# Patient Record
Sex: FEMALE | Race: WHITE | NOT HISPANIC OR LATINO | Employment: UNEMPLOYED | URBAN - METROPOLITAN AREA
[De-identification: names, ages, dates, MRNs, and addresses within clinical notes are randomized per-mention and may not be internally consistent; named-entity substitution may affect disease eponyms.]

---

## 2019-05-07 ENCOUNTER — OFFICE VISIT (OUTPATIENT)
Dept: UROLOGY | Facility: CLINIC | Age: 39
End: 2019-05-07
Payer: COMMERCIAL

## 2019-05-07 VITALS
WEIGHT: 150.81 LBS | DIASTOLIC BLOOD PRESSURE: 74 MMHG | HEART RATE: 97 BPM | HEIGHT: 62 IN | SYSTOLIC BLOOD PRESSURE: 114 MMHG | BODY MASS INDEX: 27.75 KG/M2

## 2019-05-07 DIAGNOSIS — R31.29 OTHER MICROSCOPIC HEMATURIA: ICD-10-CM

## 2019-05-07 DIAGNOSIS — N32.81 OAB (OVERACTIVE BLADDER): ICD-10-CM

## 2019-05-07 DIAGNOSIS — N30.10 IC (INTERSTITIAL CYSTITIS): ICD-10-CM

## 2019-05-07 DIAGNOSIS — R31.29 HEMATURIA, MICROSCOPIC: ICD-10-CM

## 2019-05-07 DIAGNOSIS — R39.89 BLADDER PAIN: ICD-10-CM

## 2019-05-07 DIAGNOSIS — R10.2 PELVIC PAIN: Primary | ICD-10-CM

## 2019-05-07 PROCEDURE — 51700 PR IRRIGATION, BLADDER: ICD-10-PCS | Mod: S$GLB,,, | Performed by: UROLOGY

## 2019-05-07 PROCEDURE — 88112 CYTOPATH CELL ENHANCE TECH: CPT | Performed by: PATHOLOGY

## 2019-05-07 PROCEDURE — 88112 CYTOLOGY SPECIMEN-URINE: ICD-10-PCS | Mod: 26,,, | Performed by: PATHOLOGY

## 2019-05-07 PROCEDURE — 87086 URINE CULTURE/COLONY COUNT: CPT

## 2019-05-07 PROCEDURE — 88112 CYTOPATH CELL ENHANCE TECH: CPT | Mod: 26,,, | Performed by: PATHOLOGY

## 2019-05-07 PROCEDURE — 99999 PR PBB SHADOW E&M-NEW PATIENT-LVL IV: CPT | Mod: PBBFAC,,, | Performed by: UROLOGY

## 2019-05-07 PROCEDURE — 51700 IRRIGATION OF BLADDER: CPT | Mod: S$GLB,,, | Performed by: UROLOGY

## 2019-05-07 PROCEDURE — 3008F BODY MASS INDEX DOCD: CPT | Mod: CPTII,S$GLB,, | Performed by: UROLOGY

## 2019-05-07 PROCEDURE — 99205 OFFICE O/P NEW HI 60 MIN: CPT | Mod: 25,S$GLB,, | Performed by: UROLOGY

## 2019-05-07 PROCEDURE — 3008F PR BODY MASS INDEX (BMI) DOCUMENTED: ICD-10-PCS | Mod: CPTII,S$GLB,, | Performed by: UROLOGY

## 2019-05-07 PROCEDURE — 99205 PR OFFICE/OUTPT VISIT, NEW, LEVL V, 60-74 MIN: ICD-10-PCS | Mod: 25,S$GLB,, | Performed by: UROLOGY

## 2019-05-07 PROCEDURE — 99999 PR PBB SHADOW E&M-NEW PATIENT-LVL IV: ICD-10-PCS | Mod: PBBFAC,,, | Performed by: UROLOGY

## 2019-05-07 RX ORDER — TOLTERODINE 4 MG/1
4 CAPSULE, EXTENDED RELEASE ORAL DAILY
COMMUNITY
End: 2019-07-16

## 2019-05-07 RX ORDER — INDOMETHACIN 25 MG/1
50 CAPSULE ORAL
Status: COMPLETED | OUTPATIENT
Start: 2019-05-07 | End: 2019-05-07

## 2019-05-07 RX ORDER — HYDROXYZINE HYDROCHLORIDE 25 MG/1
25 TABLET, FILM COATED ORAL NIGHTLY
Qty: 30 TABLET | Refills: 12 | Status: SHIPPED | OUTPATIENT
Start: 2019-05-07 | End: 2019-06-06

## 2019-05-07 RX ORDER — TAMSULOSIN HYDROCHLORIDE 0.4 MG/1
0.4 CAPSULE ORAL NIGHTLY
Qty: 30 CAPSULE | Refills: 11 | Status: SHIPPED | OUTPATIENT
Start: 2019-05-07 | End: 2020-06-02 | Stop reason: SDUPTHER

## 2019-05-07 RX ORDER — DIPHENHYDRAMINE HCL 25 MG
50 TABLET ORAL ONCE
Qty: 2 TABLET | Refills: 0 | Status: SHIPPED | OUTPATIENT
Start: 2019-05-07 | End: 2019-05-07

## 2019-05-07 RX ORDER — PREDNISONE 50 MG/1
50 TABLET ORAL EVERY 6 HOURS
Qty: 3 TABLET | Refills: 0 | Status: SHIPPED | OUTPATIENT
Start: 2019-05-07 | End: 2019-05-17

## 2019-05-07 RX ORDER — HEPARIN SODIUM 10000 [USP'U]/ML
20000 INJECTION, SOLUTION INTRAVENOUS; SUBCUTANEOUS
Status: COMPLETED | OUTPATIENT
Start: 2019-05-07 | End: 2019-05-07

## 2019-05-07 RX ORDER — ESTRADIOL 2 MG/1
TABLET ORAL
COMMUNITY
Start: 2019-05-04 | End: 2022-07-12 | Stop reason: CLARIF

## 2019-05-07 RX ORDER — IBUPROFEN 200 MG
200 TABLET ORAL EVERY 6 HOURS PRN
COMMUNITY
End: 2022-07-12 | Stop reason: CLARIF

## 2019-05-07 RX ORDER — AMITRIPTYLINE HYDROCHLORIDE 25 MG/1
25 TABLET, FILM COATED ORAL NIGHTLY
Qty: 60 TABLET | Refills: 12 | Status: SHIPPED | OUTPATIENT
Start: 2019-05-07 | End: 2019-07-16

## 2019-05-07 RX ORDER — VENLAFAXINE HYDROCHLORIDE 75 MG/1
CAPSULE, EXTENDED RELEASE ORAL EVERY MORNING
COMMUNITY
Start: 2019-04-27

## 2019-05-07 RX ORDER — OMEGA-3-ACID ETHYL ESTERS 1 G/1
1 CAPSULE, LIQUID FILLED ORAL DAILY
COMMUNITY
End: 2023-07-25

## 2019-05-07 RX ORDER — LANOLIN ALCOHOL/MO/W.PET/CERES
100 CREAM (GRAM) TOPICAL EVERY 12 HOURS
COMMUNITY
End: 2022-07-12 | Stop reason: CLARIF

## 2019-05-07 RX ORDER — LORAZEPAM 1 MG/1
1 TABLET ORAL EVERY 6 HOURS PRN
Qty: 30 TABLET | Refills: 0 | Status: SHIPPED | OUTPATIENT
Start: 2019-05-07 | End: 2020-10-13

## 2019-05-07 RX ORDER — BUPIVACAINE HYDROCHLORIDE 5 MG/ML
50 INJECTION, SOLUTION PERINEURAL ONCE
Status: COMPLETED | OUTPATIENT
Start: 2019-05-07 | End: 2019-05-07

## 2019-05-07 RX ADMIN — INDOMETHACIN 50 MEQ: 25 CAPSULE ORAL at 04:05

## 2019-05-07 RX ADMIN — BUPIVACAINE HYDROCHLORIDE 250 MG: 5 INJECTION, SOLUTION PERINEURAL at 04:05

## 2019-05-07 RX ADMIN — HEPARIN SODIUM 20000 UNITS: 10000 INJECTION, SOLUTION INTRAVENOUS; SUBCUTANEOUS at 04:05

## 2019-05-07 NOTE — PROGRESS NOTES
CC: refractory IC ( bladder pain), want to get her bladder removed!    Chelsea Oconnell is a 38 y.o. woman who is here for the evaluation of IC (interstitial cystitis)    A new pt referred by her urologist in [place], [place] for refractory IC.  She lives in [place], [place] and has seen many urologists over the years.  She presents with at least 5 year hx of refractory bladder and pelvic pain with frequency, urgency, difficulty of emptying bladder.  Underwent many treatment for her urinary problems but with minimal improvement.  C/o constant pain in the bladder and pelvic area.  She wishes to get her bladder removed but her urologist in [place] did not do such surgery and refer her to me.  Had botox injection to the bladder but developed urinary retention and has done CIC for 1 year.  Now she is able to urinate but only small volume at a time with straining.    Urination symptoms: Positive for frequency, urgency, nocturia and incomplete emptying, straining.  Denies flank pain     Had 5 c-sections, 4 to 5 Gyn surgeries including hysterectomy ( no malignancy).  S/p removal of liver hemangioma.      No past medical history on file.  No past surgical history on file.  Social History     Tobacco Use    Smoking status: Never Smoker    Smokeless tobacco: Never Used   Substance Use Topics    Alcohol use: Not on file    Drug use: Not on file     No family history on file.  Allergy:  Review of patient's allergies indicates:   Allergen Reactions    Iodine and iodide containing products Rash and Swelling    Shellfish containing products Rash and Swelling    Sulfa (sulfonamide antibiotics) Rash and Swelling     Outpatient Encounter Medications as of 5/7/2019   Medication Sig Dispense Refill    calcium glycerophosphate (PRELIEF ORAL) Take 2 mg by mouth once daily.      conjugated estrogens (PREMARIN) vaginal cream Place vaginally.      cyanocobalamin (VITAMIN B-12) 1000 MCG tablet Take 100 mcg by mouth every 12 (twelve)  hours.      estradiol (ESTRACE) 2 MG tablet       ibuprofen (ADVIL,MOTRIN) 200 MG tablet Take 200 mg by mouth every 6 (six) hours as needed for Pain.      Lactobacillus rhamnosus GG (CULTURELLE) 10 billion cell capsule Take 1 capsule by mouth once daily.      omega-3 acid ethyl esters (LOVAZA) 1 gram capsule Take 1 g by mouth once daily.      tolterodine (DETROL LA) 4 MG 24 hr capsule Take 4 mg by mouth once daily.      venlafaxine (EFFEXOR-XR) 75 MG 24 hr capsule       amitriptyline (ELAVIL) 25 MG tablet Take 1 tablet (25 mg total) by mouth every evening. 1 to 2 tablets at bed time to help with bladder pain and sleeping 60 tablet 12    diphenhydrAMINE (BENADRYL ALLERGY) 25 mg tablet Take 2 tablets (50 mg total) by mouth once. Take them 1 hour before contrast x-ray study for 1 dose 2 tablet 0    hydrOXYzine HCl (ATARAX) 25 MG tablet Take 1 tablet (25 mg total) by mouth nightly. 30 tablet 12    LORazepam (ATIVAN) 1 MG tablet Take 1 tablet (1 mg total) by mouth every 6 (six) hours as needed for Anxiety. 30 tablet 0    predniSONE (DELTASONE) 50 MG Tab Take 1 tablet (50 mg total) by mouth every 6 (six) hours. Take the first tablet 13 hours before,  The second one 7 hours before,  And the third one 1 hour before radiology test. for 10 days 3 tablet 0    tamsulosin (FLOMAX) 0.4 mg Cap Take 1 capsule (0.4 mg total) by mouth every evening. 30 capsule 11     Facility-Administered Encounter Medications as of 5/7/2019   Medication Dose Route Frequency Provider Last Rate Last Dose    [COMPLETED] bupivacaine 0.5% (5 mg/ml) injection 250 mg  50 mL Intrapleural Once Donny Calle MD   250 mg at 05/07/19 1624    [COMPLETED] heparin (porcine) injection 20,000 Units  20,000 Units Subcutaneous 1 time in Clinic/HOD Donny Calle MD   20,000 Units at 05/07/19 1623    hydrocortisone sodium succinate injection 100 mg  100 mg Intramuscular Once Donny Calel MD        [COMPLETED] sodium bicarbonate solution 50 mEq  50  mEq Intravenous 1 time in Clinic/HOD Donny Calle MD   50 mEq at 05/07/19 1624     Review of Systems   ROS  Physical Exam     Vitals:    05/07/19 1445   BP: 114/74   Pulse: 97     Physical Exam   Genitourinary: Vagina normal.   Genitourinary Comments: No tenderness at the urethra.  Positive tenderness at the kerrie-urethral area on the both sides.  Positive tenderness on palpation each side of vaginal wall.  No tenderness on palpation at the base of the bladder.    Difficulty in localizing the levator ani on YEE of rectum.  Slight tenderness noted on the levator ani.         LABS:  No results found for: CREATININE  No results found for: LABURIN  UA trace blood and protein  PVR per cath 120 ml    Bladder Instillation Procedure:  A 16 F Gomez catheter was placed and the bladder was drained without problems.    Intravesical cocktail mixture treatment is given in a standard fashion.  The solution of 20,000 unit heparin, 50 ml 0.5 % Marcaine, 10 ml 1% lidocaine, and 10 ml 8.4% sodium bicarbonate was instilled in the bladder, and the catheter was removed. The patient was instructed to hold the mixture solution in the bladder for 20 to 30 minutes and to void as instructed.    Patient tolerated the procedure well.    Assessment and Plan:  Chelsea was seen today for ic (interstitial cystitis).    Diagnoses and all orders for this visit:    Pelvic pain  -     Ambulatory Referral to Physical/Occupational Therapy  -     tamsulosin (FLOMAX) 0.4 mg Cap; Take 1 capsule (0.4 mg total) by mouth every evening.  -     amitriptyline (ELAVIL) 25 MG tablet; Take 1 tablet (25 mg total) by mouth every evening. 1 to 2 tablets at bed time to help with bladder pain and sleeping  -     LORazepam (ATIVAN) 1 MG tablet; Take 1 tablet (1 mg total) by mouth every 6 (six) hours as needed for Anxiety.    IC (interstitial cystitis)  -     Urine culture  -     Cytology, urine  -     Comprehensive metabolic panel; Future  -     CBC Without  Differential; Future  -     heparin (porcine) injection 20,000 Units  -     bupivacaine 0.5% (5 mg/ml) injection 250 mg  -     sodium bicarbonate solution 50 mEq  -     hydrocortisone sodium succinate injection 100 mg  -     Prior Authorization Order  -     Simple Urodynamics w/ Cysto; Future  -     hydrOXYzine HCl (ATARAX) 25 MG tablet; Take 1 tablet (25 mg total) by mouth nightly.    Bladder pain  -     heparin (porcine) injection 20,000 Units  -     bupivacaine 0.5% (5 mg/ml) injection 250 mg  -     sodium bicarbonate solution 50 mEq  -     hydrocortisone sodium succinate injection 100 mg  -     Prior Authorization Order  -     Simple Urodynamics w/ Cysto; Future    Other microscopic hematuria  -     CT Urogram Abd Pelvis W WO; Future  -     Simple Urodynamics w/ Cysto; Future    OAB (overactive bladder)  -     tamsulosin (FLOMAX) 0.4 mg Cap; Take 1 capsule (0.4 mg total) by mouth every evening.    Hematuria, microscopic  -     diphenhydrAMINE (BENADRYL ALLERGY) 25 mg tablet; Take 2 tablets (50 mg total) by mouth once. Take them 1 hour before contrast x-ray study for 1 dose  -     predniSONE (DELTASONE) 50 MG Tab; Take 1 tablet (50 mg total) by mouth every 6 (six) hours. Take the first tablet 13 hours before,  The second one 7 hours before,  And the third one 1 hour before radiology test. for 10 days    I explained that the removal of the bladder in her may result in persistent pain.  On my exam today, her main pain is localized in the pelvic muscles.  Suspect pelvic floor dysfunction.  Will refer her to Physical Therapy ( she had some physical therapy in the past with minimal improvement).  She will start flomax, elavil, hydroxyzine, and ativan ( 1/2 to 1 tab at night).    Will further evaluate her with FUDS.  Because she has hematuria, will further evaluate her CT urogram and cysto under anesthesia.  Will plan trigger point injection of the painful pelvic muscles, cysto hydrodistention, bladder bx and bladder  instillation along with FUDS in 1 month or so.  Prednisone and Benadryl prep given for CT urogram.    She experienced significant improvement of her bladder pain after bladder instillation ( from 6/10 to 2/10 on visual scale, except pain at the urethra following the catheterization)    Follow-up:  Follow up for FUDS, cysto hydrodistetion, bladder bx, bladder instillation, Triger point injection.  CT urogram.

## 2019-05-07 NOTE — H&P (VIEW-ONLY)
CC: refractory IC ( bladder pain), want to get her bladder removed!    Chelsea Oconnell is a 38 y.o. woman who is here for the evaluation of IC (interstitial cystitis)    A new pt referred by her urologist in [place], [place] for refractory IC.  She lives in [place], [place] and has seen many urologists over the years.  She presents with at least 5 year hx of refractory bladder and pelvic pain with frequency, urgency, difficulty of emptying bladder.  Underwent many treatment for her urinary problems but with minimal improvement.  C/o constant pain in the bladder and pelvic area.  She wishes to get her bladder removed but her urologist in [place] did not do such surgery and refer her to me.  Had botox injection to the bladder but developed urinary retention and has done CIC for 1 year.  Now she is able to urinate but only small volume at a time with straining.    Urination symptoms: Positive for frequency, urgency, nocturia and incomplete emptying, straining.  Denies flank pain     Had 5 c-sections, 4 to 5 Gyn surgeries including hysterectomy ( no malignancy).  S/p removal of liver hemangioma.      No past medical history on file.  No past surgical history on file.  Social History     Tobacco Use    Smoking status: Never Smoker    Smokeless tobacco: Never Used   Substance Use Topics    Alcohol use: Not on file    Drug use: Not on file     No family history on file.  Allergy:  Review of patient's allergies indicates:   Allergen Reactions    Iodine and iodide containing products Rash and Swelling    Shellfish containing products Rash and Swelling    Sulfa (sulfonamide antibiotics) Rash and Swelling     Outpatient Encounter Medications as of 5/7/2019   Medication Sig Dispense Refill    calcium glycerophosphate (PRELIEF ORAL) Take 2 mg by mouth once daily.      conjugated estrogens (PREMARIN) vaginal cream Place vaginally.      cyanocobalamin (VITAMIN B-12) 1000 MCG tablet Take 100 mcg by mouth every 12 (twelve)  hours.      estradiol (ESTRACE) 2 MG tablet       ibuprofen (ADVIL,MOTRIN) 200 MG tablet Take 200 mg by mouth every 6 (six) hours as needed for Pain.      Lactobacillus rhamnosus GG (CULTURELLE) 10 billion cell capsule Take 1 capsule by mouth once daily.      omega-3 acid ethyl esters (LOVAZA) 1 gram capsule Take 1 g by mouth once daily.      tolterodine (DETROL LA) 4 MG 24 hr capsule Take 4 mg by mouth once daily.      venlafaxine (EFFEXOR-XR) 75 MG 24 hr capsule       amitriptyline (ELAVIL) 25 MG tablet Take 1 tablet (25 mg total) by mouth every evening. 1 to 2 tablets at bed time to help with bladder pain and sleeping 60 tablet 12    diphenhydrAMINE (BENADRYL ALLERGY) 25 mg tablet Take 2 tablets (50 mg total) by mouth once. Take them 1 hour before contrast x-ray study for 1 dose 2 tablet 0    hydrOXYzine HCl (ATARAX) 25 MG tablet Take 1 tablet (25 mg total) by mouth nightly. 30 tablet 12    LORazepam (ATIVAN) 1 MG tablet Take 1 tablet (1 mg total) by mouth every 6 (six) hours as needed for Anxiety. 30 tablet 0    predniSONE (DELTASONE) 50 MG Tab Take 1 tablet (50 mg total) by mouth every 6 (six) hours. Take the first tablet 13 hours before,  The second one 7 hours before,  And the third one 1 hour before radiology test. for 10 days 3 tablet 0    tamsulosin (FLOMAX) 0.4 mg Cap Take 1 capsule (0.4 mg total) by mouth every evening. 30 capsule 11     Facility-Administered Encounter Medications as of 5/7/2019   Medication Dose Route Frequency Provider Last Rate Last Dose    [COMPLETED] bupivacaine 0.5% (5 mg/ml) injection 250 mg  50 mL Intrapleural Once Donny Calle MD   250 mg at 05/07/19 1624    [COMPLETED] heparin (porcine) injection 20,000 Units  20,000 Units Subcutaneous 1 time in Clinic/HOD Donny Calle MD   20,000 Units at 05/07/19 1623    hydrocortisone sodium succinate injection 100 mg  100 mg Intramuscular Once Donny Calle MD        [COMPLETED] sodium bicarbonate solution 50 mEq  50  mEq Intravenous 1 time in Clinic/HOD Donny Calle MD   50 mEq at 05/07/19 1624     Review of Systems   ROS  Physical Exam     Vitals:    05/07/19 1445   BP: 114/74   Pulse: 97     Physical Exam   Genitourinary: Vagina normal.   Genitourinary Comments: No tenderness at the urethra.  Positive tenderness at the kerrie-urethral area on the both sides.  Positive tenderness on palpation each side of vaginal wall.  No tenderness on palpation at the base of the bladder.    Difficulty in localizing the levator ani on YEE of rectum.  Slight tenderness noted on the levator ani.         LABS:  No results found for: CREATININE  No results found for: LABURIN  UA trace blood and protein  PVR per cath 120 ml    Bladder Instillation Procedure:  A 16 F Gomez catheter was placed and the bladder was drained without problems.    Intravesical cocktail mixture treatment is given in a standard fashion.  The solution of 20,000 unit heparin, 50 ml 0.5 % Marcaine, 10 ml 1% lidocaine, and 10 ml 8.4% sodium bicarbonate was instilled in the bladder, and the catheter was removed. The patient was instructed to hold the mixture solution in the bladder for 20 to 30 minutes and to void as instructed.    Patient tolerated the procedure well.    Assessment and Plan:  Chelsea was seen today for ic (interstitial cystitis).    Diagnoses and all orders for this visit:    Pelvic pain  -     Ambulatory Referral to Physical/Occupational Therapy  -     tamsulosin (FLOMAX) 0.4 mg Cap; Take 1 capsule (0.4 mg total) by mouth every evening.  -     amitriptyline (ELAVIL) 25 MG tablet; Take 1 tablet (25 mg total) by mouth every evening. 1 to 2 tablets at bed time to help with bladder pain and sleeping  -     LORazepam (ATIVAN) 1 MG tablet; Take 1 tablet (1 mg total) by mouth every 6 (six) hours as needed for Anxiety.    IC (interstitial cystitis)  -     Urine culture  -     Cytology, urine  -     Comprehensive metabolic panel; Future  -     CBC Without  Differential; Future  -     heparin (porcine) injection 20,000 Units  -     bupivacaine 0.5% (5 mg/ml) injection 250 mg  -     sodium bicarbonate solution 50 mEq  -     hydrocortisone sodium succinate injection 100 mg  -     Prior Authorization Order  -     Simple Urodynamics w/ Cysto; Future  -     hydrOXYzine HCl (ATARAX) 25 MG tablet; Take 1 tablet (25 mg total) by mouth nightly.    Bladder pain  -     heparin (porcine) injection 20,000 Units  -     bupivacaine 0.5% (5 mg/ml) injection 250 mg  -     sodium bicarbonate solution 50 mEq  -     hydrocortisone sodium succinate injection 100 mg  -     Prior Authorization Order  -     Simple Urodynamics w/ Cysto; Future    Other microscopic hematuria  -     CT Urogram Abd Pelvis W WO; Future  -     Simple Urodynamics w/ Cysto; Future    OAB (overactive bladder)  -     tamsulosin (FLOMAX) 0.4 mg Cap; Take 1 capsule (0.4 mg total) by mouth every evening.    Hematuria, microscopic  -     diphenhydrAMINE (BENADRYL ALLERGY) 25 mg tablet; Take 2 tablets (50 mg total) by mouth once. Take them 1 hour before contrast x-ray study for 1 dose  -     predniSONE (DELTASONE) 50 MG Tab; Take 1 tablet (50 mg total) by mouth every 6 (six) hours. Take the first tablet 13 hours before,  The second one 7 hours before,  And the third one 1 hour before radiology test. for 10 days    I explained that the removal of the bladder in her may result in persistent pain.  On my exam today, her main pain is localized in the pelvic muscles.  Suspect pelvic floor dysfunction.  Will refer her to Physical Therapy ( she had some physical therapy in the past with minimal improvement).  She will start flomax, elavil, hydroxyzine, and ativan ( 1/2 to 1 tab at night).    Will further evaluate her with FUDS.  Because she has hematuria, will further evaluate her CT urogram and cysto under anesthesia.  Will plan trigger point injection of the painful pelvic muscles, cysto hydrodistention, bladder bx and bladder  instillation along with FUDS in 1 month or so.  Prednisone and Benadryl prep given for CT urogram.    She experienced significant improvement of her bladder pain after bladder instillation ( from 6/10 to 2/10 on visual scale, except pain at the urethra following the catheterization)    Follow-up:  Follow up for FUDS, cysto hydrodistetion, bladder bx, bladder instillation, Triger point injection.  CT urogram.

## 2019-05-08 ENCOUNTER — TELEPHONE (OUTPATIENT)
Dept: UROLOGY | Facility: CLINIC | Age: 39
End: 2019-05-08

## 2019-05-08 DIAGNOSIS — R33.9 INCOMPLETE BLADDER EMPTYING: Primary | ICD-10-CM

## 2019-05-08 DIAGNOSIS — R10.2 PELVIC PAIN: ICD-10-CM

## 2019-05-08 DIAGNOSIS — N30.10 IC (INTERSTITIAL CYSTITIS): Primary | ICD-10-CM

## 2019-05-08 DIAGNOSIS — N31.8 FREQUENCY-URGENCY SYNDROME: ICD-10-CM

## 2019-05-08 DIAGNOSIS — R39.89 BLADDER PAIN: ICD-10-CM

## 2019-05-08 LAB — BACTERIA UR CULT: NO GROWTH

## 2019-05-09 NOTE — TELEPHONE ENCOUNTER
IC (interstitial cystitis)  -     Case Request Operating Room: CYSTOSCOPY, WITH BLADDER HYDRODISTENSION AND BIOPSY, INSTILLATION, BLADDER, INJECTION, BOTULINUM TOXIN, TYPE A TRIGGER POINT INJECTION TO PELVIC MUSCLE    Bladder pain  -     Case Request Operating Room: CYSTOSCOPY, WITH BLADDER HYDRODISTENSION AND BIOPSY, INSTILLATION, BLADDER, INJECTION, BOTULINUM TOXIN, TYPE A TRIGGER POINT INJECTION TO PELVIC MUSCLE    Pelvic pain  -     Case Request Operating Room: CYSTOSCOPY, WITH BLADDER HYDRODISTENSION AND BIOPSY, INSTILLATION, BLADDER, INJECTION, BOTULINUM TOXIN, TYPE A TRIGGER POINT INJECTION TO PELVIC MUSCLE    Frequency-urgency syndrome  -     Case Request Operating Room: CYSTOSCOPY, WITH BLADDER HYDRODISTENSION AND BIOPSY, INSTILLATION, BLADDER, INJECTION, BOTULINUM TOXIN, TYPE A TRIGGER POINT INJECTION TO PELVIC MUSCLE

## 2019-05-09 NOTE — TELEPHONE ENCOUNTER
Incomplete bladder emptying  -     Case Request Operating Room: URODYNAMIC STUDY, FLUOROSCOPIC

## 2019-05-24 ENCOUNTER — ANESTHESIA EVENT (OUTPATIENT)
Dept: SURGERY | Facility: HOSPITAL | Age: 39
End: 2019-05-24
Payer: COMMERCIAL

## 2019-05-24 NOTE — PRE ADMISSION SCREENING
Anesthesia Assessment: Preoperative EQUATION    Planned Procedure: Procedure(s) (LRB):  CYSTOSCOPY, WITH BLADDER HYDRODISTENSION AND BIOPSY (N/A)  INSTILLATION, BLADDER (N/A)  INJECTION, BOTULINUM TOXIN, TYPE A TRIGGER POINT INJECTION TO PELVIC MUSCLE (N/A)  Requested Anesthesia Type:Monitor Anesthesia Care  Surgeon: Donny Calle MD  Service: Urology  Known or anticipated Date of Surgery:6/5/2019    Surgeon notes: reviewed    Electronic QUestionnaire Assessment completed via nurse interview with patient.        No AQ      Triage considerations:     The patient has no apparent active cardiac condition (No unstable coronary Syndrome such as severe unstable angina or recent [<1 month] myocardial infarction, decompensated CHF, severe valvular   disease or significant arrhythmia)    Previous anesthesia records:GETA, No problems and Not available    Last PCP note: 6-12 months ago , outside [facility]   Subspecialty notes: n/a    Other important co-morbidities: Lupus     Tests already available:  Available tests,  within 1 month , within [facility] . 5/7/19 CBC, CMP. No EKG.            Instructions given. (See in Nurse's note)    Optimization:  Anesthesia Preop Clinic Assessment  Indicated-not required for this procedure          Plan:    Testing:  none     Patient  has previously scheduled Medical Appointment:  6/4 pm CT Urogram    Navigation:                Straight Line to surgery.               No tests, anesthesia preop clinic visit, or consult required.

## 2019-05-24 NOTE — ANESTHESIA PREPROCEDURE EVALUATION
Che Abdalla RN   Registered Nurse      Pre Admission Screening   Signed                       []Hide copied text    []Hover for details  Anesthesia Assessment: Preoperative EQUATION     Planned Procedure: Procedure(s) (LRB):  CYSTOSCOPY, WITH BLADDER HYDRODISTENSION AND BIOPSY (N/A)  INSTILLATION, BLADDER (N/A)  INJECTION, BOTULINUM TOXIN, TYPE A TRIGGER POINT INJECTION TO PELVIC MUSCLE (N/A)  Requested Anesthesia Type:Monitor Anesthesia Care  Surgeon: Donny Calle MD  Service: Urology  Known or anticipated Date of Surgery:6/5/2019     Surgeon notes: reviewed     Electronic QUestionnaire Assessment completed via nurse interview with patient.         No AQ        Triage considerations:      The patient has no apparent active cardiac condition (No unstable coronary Syndrome such as severe unstable angina or recent [<1 month] myocardial infarction, decompensated CHF, severe valvular   disease or significant arrhythmia)     Previous anesthesia records:GETA, No problems and Not available     Last PCP note: 6-12 months ago , outside [facility]   Subspecialty notes: n/a     Other important co-morbidities: Lupus     Tests already available:  Available tests,  within 1 month , within [facility] . 5/7/19 CBC, CMP. No EKG.                            Instructions given. (See in Nurse's note)     Optimization:  Anesthesia Preop Clinic Assessment  Indicated-not required for this procedure                    Plan:    Testing:  none                           Patient  has previously scheduled Medical Appointment:  6/4 pm CT Urogram     Navigation:                          Straight Line to surgery.                          No tests, anesthesia preop clinic visit, or consult required.                                                      Electronically signed by Che Abdalla RN at 5/24/2019  2:59 PM       Pre-admit on 6/5/2019          Detailed Report                                                                                                                   05/24/2019  Chelsea Oconnell is a 38 y.o., female.    Diagnosis:       IC (interstitial cystitis) [N30.10]      Bladder pain [R39.89]      Pelvic pain [R10.2]      Frequency-urgency syndrome [N31.8]    Pre-operative evaluation for Procedure(s) (LRB):  CYSTOSCOPY, WITH BLADDER HYDRODISTENSION AND BIOPSY (N/A)  INSTILLATION, BLADDER (N/A)  INJECTION, BOTULINUM TOXIN, TYPE A TRIGGER POINT INJECTION TO PELVIC MUSCLE (N/A)    Encounter Diagnoses   Name Primary?    Interstitial cystitis     Bladder disorder        Review of patient's allergies indicates:   Allergen Reactions    Iodine and iodide containing products Rash and Swelling    Shellfish containing products Rash and Swelling    Sulfa (sulfonamide antibiotics) Rash and Swelling       No current facility-administered medications on file prior to encounter.      Current Outpatient Medications on File Prior to Encounter   Medication Sig Dispense Refill    amitriptyline (ELAVIL) 25 MG tablet Take 1 tablet (25 mg total) by mouth every evening. 1 to 2 tablets at bed time to help with bladder pain and sleeping 60 tablet 12    cyanocobalamin (VITAMIN B-12) 1000 MCG tablet Take 100 mcg by mouth every 12 (twelve) hours.      estradiol (ESTRACE) 2 MG tablet       hydrOXYzine HCl (ATARAX) 25 MG tablet Take 1 tablet (25 mg total) by mouth nightly. 30 tablet 12    ibuprofen (ADVIL,MOTRIN) 200 MG tablet Take 200 mg by mouth every 6 (six) hours as needed for Pain.      Lactobacillus rhamnosus GG (CULTURELLE) 10 billion cell capsule Take 1 capsule by mouth once daily.      omega-3 acid ethyl esters (LOVAZA) 1 gram capsule Take 1 g by mouth once daily.      tamsulosin (FLOMAX) 0.4 mg Cap Take 1 capsule (0.4 mg total) by mouth every evening. 30 capsule 11    venlafaxine (EFFEXOR-XR) 75 MG 24 hr capsule       calcium glycerophosphate (PRELIEF ORAL) Take 2 mg by mouth once daily.      conjugated estrogens (PREMARIN) vaginal cream  Place vaginally.      LORazepam (ATIVAN) 1 MG tablet Take 1 tablet (1 mg total) by mouth every 6 (six) hours as needed for Anxiety. 30 tablet 0    tolterodine (DETROL LA) 4 MG 24 hr capsule Take 4 mg by mouth once daily.         Social History     Tobacco Use   Smoking Status Never Smoker   Smokeless Tobacco Never Used       Social History     Substance and Sexual Activity   Alcohol Use Never    Frequency: Never       Patient Active Problem List   Diagnosis    IC (interstitial cystitis)    Bladder pain    OAB (overactive bladder)    Interstitial cystitis       Past Surgical History:   Procedure Laterality Date    HYSTERECTOMY      liver      hemangioma removed           No results for input(s): HCT in the last 72 hours.  No results for input(s): PLT in the last 72 hours.  No results for input(s): K in the last 72 hours.  No results for input(s): CREATININE in the last 72 hours.  No results for input(s): GLU in the last 72 hours.  No results for input(s): PT in the last 72 hours.                    Anesthesia Evaluation         Review of Systems  Anesthesia Hx:  No problems with previous Anesthesia WANTS TO KEEP IN HEAERING AIDS, RED ON RIGHT, BLUE ON LEFT.  I SAID WE WOULD TAKE THEM OUT WHEN WE SEDATE HER DEEPLY History of prior surgery of interest to airway management or planning: Previous anesthesia: General liver hemangioma removed with general anesthesia.  Denies Family Hx of Anesthesia complications.   Denies Personal Hx of Anesthesia complications.   Social:  No Alcohol Use, Non-Smoker    Hematology/Oncology:     Oncology Normal   Hematology Comments: luupus well controlled right now   EENT/Dental:   Ears General/Symptom(s) Hearing Impairment: hearing-aid left, hearing-aid right    Cardiovascular:   Denies Hypertension.  Denies MI.    Denies Angina. Walks a mile on occasion. Functional Capacity good / => 4 METS    Pulmonary:  Pulmonary Normal  Denies COPD.  Denies Asthma.  Denies Shortness of breath.   Denies Recent URI.    Renal/:  Renal Symptoms/Infections/Stones: frequency, urgency.  Other Renal / Gu Conditions: (possible pelvic floor dysfunction) Interstitial Cystitis  Hepatic/GI:   Liver Disease, (had liver resection for hemangioma )  Liver Disease S/P resection of liver hemangioma   Musculoskeletal:  Rheumatic Disease, Lupus    Neurological:  Neurology Normal Denies TIA.  Denies CVA. Denies Seizures.    Endocrine:  Endocrine Normal Denies Diabetes. Prone to hypoglycemia with dizziness and headaches responds hard candy, will do accucheck and give d5 1/2 ns   Psych:   depression          Physical Exam  General:  Well nourished    Airway/Jaw/Neck:  Airway Findings: Mouth Opening: Normal Tongue: Normal  General Airway Assessment: Adult, Average  Mallampati: II  TM Distance: Normal, at least 6 cm  Jaw/Neck Findings:  Neck ROM: Normal ROM            Mental Status:  Mental Status Findings:  Cooperative, Alert and Oriented         Anesthesia Plan  Type of Anesthesia, risks & benefits discussed:  Anesthesia Type:  general, MAC  Patient's Preference:   Intra-op Monitoring Plan:   Intra-op Monitoring Plan Comments:   Post Op Pain Control Plan:   Post Op Pain Control Plan Comments: As per surgeon's plan  Induction:   IV  Beta Blocker:  Patient is not currently on a Beta-Blocker (No further documentation required).       Informed Consent: Patient understands risks and agrees with Anesthesia plan.  Questions answered. Anesthesia consent signed with patient.  ASA Score: 2     Day of Surgery Review of History & Physical:    H&P update referred to the surgeon.         Ready For Surgery From Anesthesia Perspective.

## 2019-06-04 ENCOUNTER — TELEPHONE (OUTPATIENT)
Dept: UROLOGY | Facility: CLINIC | Age: 39
End: 2019-06-04

## 2019-06-04 ENCOUNTER — HOSPITAL ENCOUNTER (OUTPATIENT)
Dept: RADIOLOGY | Facility: HOSPITAL | Age: 39
Discharge: HOME | End: 2019-06-04
Attending: UROLOGY
Payer: COMMERCIAL

## 2019-06-04 VITALS
DIASTOLIC BLOOD PRESSURE: 72 MMHG | RESPIRATION RATE: 18 BRPM | SYSTOLIC BLOOD PRESSURE: 120 MMHG | OXYGEN SATURATION: 99 % | HEART RATE: 89 BPM

## 2019-06-04 DIAGNOSIS — R31.29 OTHER MICROSCOPIC HEMATURIA: ICD-10-CM

## 2019-06-04 PROCEDURE — 74178 CT ABD&PLV WO CNTR FLWD CNTR: CPT | Mod: 26,,, | Performed by: RADIOLOGY

## 2019-06-04 PROCEDURE — 74178 CT UROGRAM ABD PELVIS W WO: ICD-10-PCS | Mod: 26,,, | Performed by: RADIOLOGY

## 2019-06-04 PROCEDURE — 74178 CT ABD&PLV WO CNTR FLWD CNTR: CPT | Mod: TC

## 2019-06-04 PROCEDURE — 25500020 PHARM REV CODE 255: Performed by: UROLOGY

## 2019-06-04 RX ADMIN — IOHEXOL 75 ML: 350 INJECTION, SOLUTION INTRAVENOUS at 07:06

## 2019-06-04 NOTE — CARE UPDATE
Patient states she has had previous reaction to iodine and is taking a 13hrs prednisone prep ordered by her doctor. Discussed with tech and orders for benadryl 50mg po obtained from radiologist. 50 PO benadryl given as ordered. Will monitor for contrast reaction during and post contrast.

## 2019-06-05 ENCOUNTER — HOSPITAL ENCOUNTER (OUTPATIENT)
Facility: HOSPITAL | Age: 39
Discharge: HOME | End: 2019-06-05
Attending: UROLOGY | Admitting: UROLOGY
Payer: COMMERCIAL

## 2019-06-05 ENCOUNTER — ANESTHESIA (OUTPATIENT)
Dept: SURGERY | Facility: HOSPITAL | Age: 39
End: 2019-06-05
Payer: COMMERCIAL

## 2019-06-05 DIAGNOSIS — N30.10 INTERSTITIAL CYSTITIS: Primary | ICD-10-CM

## 2019-06-05 DIAGNOSIS — N32.9 BLADDER DISORDER: ICD-10-CM

## 2019-06-05 LAB — POCT GLUCOSE: 119 MG/DL (ref 70–110)

## 2019-06-05 PROCEDURE — D9220A PRA ANESTHESIA: ICD-10-PCS | Mod: CRNA,,, | Performed by: NURSE ANESTHETIST, CERTIFIED REGISTERED

## 2019-06-05 PROCEDURE — 36000704 HC OR TIME LEV I 1ST 15 MIN: Performed by: UROLOGY

## 2019-06-05 PROCEDURE — 71000044 HC DOSC ROUTINE RECOVERY FIRST HOUR: Performed by: UROLOGY

## 2019-06-05 PROCEDURE — 52260 PR CYSTOSCOPY,DIL BLADDER,GEN ANESTH: ICD-10-PCS | Mod: ,,, | Performed by: UROLOGY

## 2019-06-05 PROCEDURE — 25000003 PHARM REV CODE 250: Performed by: STUDENT IN AN ORGANIZED HEALTH CARE EDUCATION/TRAINING PROGRAM

## 2019-06-05 PROCEDURE — 82962 GLUCOSE BLOOD TEST: CPT | Performed by: UROLOGY

## 2019-06-05 PROCEDURE — 63600175 PHARM REV CODE 636 W HCPCS

## 2019-06-05 PROCEDURE — S5010 5% DEXTROSE AND 0.45% SALINE: HCPCS | Performed by: ANESTHESIOLOGY

## 2019-06-05 PROCEDURE — 27200973 HC CYSTO SUPPLY IV (URODYNAMICS): Performed by: UROLOGY

## 2019-06-05 PROCEDURE — 37000008 HC ANESTHESIA 1ST 15 MINUTES: Performed by: UROLOGY

## 2019-06-05 PROCEDURE — 37000009 HC ANESTHESIA EA ADD 15 MINS: Performed by: UROLOGY

## 2019-06-05 PROCEDURE — 36000706: Performed by: UROLOGY

## 2019-06-05 PROCEDURE — S0020 INJECTION, BUPIVICAINE HYDRO: HCPCS

## 2019-06-05 PROCEDURE — 71000015 HC POSTOP RECOV 1ST HR: Performed by: UROLOGY

## 2019-06-05 PROCEDURE — D9220A PRA ANESTHESIA: Mod: ANES,,, | Performed by: ANESTHESIOLOGY

## 2019-06-05 PROCEDURE — 51797 PR VOIDING PRESS STUDY INTRA-ABDOMINAL VOID: ICD-10-PCS | Mod: 26,,, | Performed by: UROLOGY

## 2019-06-05 PROCEDURE — 71000016 HC POSTOP RECOV ADDL HR: Performed by: UROLOGY

## 2019-06-05 PROCEDURE — 51728 CYSTOMETROGRAM W/VP: CPT | Mod: 26,,, | Performed by: UROLOGY

## 2019-06-05 PROCEDURE — 51700 IRRIGATION OF BLADDER: CPT | Mod: 51,,, | Performed by: UROLOGY

## 2019-06-05 PROCEDURE — 63600175 PHARM REV CODE 636 W HCPCS: Performed by: ANESTHESIOLOGY

## 2019-06-05 PROCEDURE — 63600175 PHARM REV CODE 636 W HCPCS: Performed by: STUDENT IN AN ORGANIZED HEALTH CARE EDUCATION/TRAINING PROGRAM

## 2019-06-05 PROCEDURE — D9220A PRA ANESTHESIA: Mod: CRNA,,, | Performed by: NURSE ANESTHETIST, CERTIFIED REGISTERED

## 2019-06-05 PROCEDURE — 36000707: Performed by: UROLOGY

## 2019-06-05 PROCEDURE — 52260 CYSTOSCOPY AND TREATMENT: CPT | Mod: ,,, | Performed by: UROLOGY

## 2019-06-05 PROCEDURE — 51728 PR COMPLEX CYSTOMETROGRAM VOIDING PRESSURE STUDIES: ICD-10-PCS | Mod: 26,,, | Performed by: UROLOGY

## 2019-06-05 PROCEDURE — 51700 PR IRRIGATION, BLADDER: ICD-10-PCS | Mod: 51,,, | Performed by: UROLOGY

## 2019-06-05 PROCEDURE — 51784 PR ANAL/URINARY MUSCLE STUDY: ICD-10-PCS | Mod: 26,51,, | Performed by: UROLOGY

## 2019-06-05 PROCEDURE — 25000003 PHARM REV CODE 250

## 2019-06-05 PROCEDURE — 36000705 HC OR TIME LEV I EA ADD 15 MIN: Performed by: UROLOGY

## 2019-06-05 PROCEDURE — 51741 PR UROFLOWMETRY, COMPLEX: ICD-10-PCS | Mod: 26,51,, | Performed by: UROLOGY

## 2019-06-05 PROCEDURE — 51797 INTRAABDOMINAL PRESSURE TEST: CPT | Mod: 26,,, | Performed by: UROLOGY

## 2019-06-05 PROCEDURE — D9220A PRA ANESTHESIA: ICD-10-PCS | Mod: ANES,,, | Performed by: ANESTHESIOLOGY

## 2019-06-05 PROCEDURE — 51741 ELECTRO-UROFLOWMETRY FIRST: CPT | Mod: 26,51,, | Performed by: UROLOGY

## 2019-06-05 PROCEDURE — 25000003 PHARM REV CODE 250: Performed by: ANESTHESIOLOGY

## 2019-06-05 PROCEDURE — 51784 ANAL/URINARY MUSCLE STUDY: CPT | Mod: 26,51,, | Performed by: UROLOGY

## 2019-06-05 PROCEDURE — 63600175 PHARM REV CODE 636 W HCPCS: Performed by: NURSE ANESTHETIST, CERTIFIED REGISTERED

## 2019-06-05 RX ORDER — LIDOCAINE HYDROCHLORIDE 10 MG/ML
1 INJECTION, SOLUTION EPIDURAL; INFILTRATION; INTRACAUDAL; PERINEURAL ONCE
Status: COMPLETED | OUTPATIENT
Start: 2019-06-05 | End: 2019-06-05

## 2019-06-05 RX ORDER — LIDOCAINE HCL/PF 100 MG/5ML
SYRINGE (ML) INTRAVENOUS
Status: DISCONTINUED | OUTPATIENT
Start: 2019-06-05 | End: 2019-06-05

## 2019-06-05 RX ORDER — PROPOFOL 10 MG/ML
VIAL (ML) INTRAVENOUS
Status: DISCONTINUED | OUTPATIENT
Start: 2019-06-05 | End: 2019-06-05

## 2019-06-05 RX ORDER — ONDANSETRON 8 MG/1
8 TABLET, ORALLY DISINTEGRATING ORAL EVERY 8 HOURS PRN
Status: DISCONTINUED | OUTPATIENT
Start: 2019-06-05 | End: 2019-06-05 | Stop reason: HOSPADM

## 2019-06-05 RX ORDER — PROPOFOL 10 MG/ML
VIAL (ML) INTRAVENOUS CONTINUOUS PRN
Status: DISCONTINUED | OUTPATIENT
Start: 2019-06-05 | End: 2019-06-05

## 2019-06-05 RX ORDER — PHENAZOPYRIDINE HYDROCHLORIDE 200 MG/1
200 TABLET, FILM COATED ORAL ONCE
Status: DISCONTINUED | OUTPATIENT
Start: 2019-06-05 | End: 2019-06-05 | Stop reason: HOSPADM

## 2019-06-05 RX ORDER — KETOROLAC TROMETHAMINE 30 MG/ML
INJECTION, SOLUTION INTRAMUSCULAR; INTRAVENOUS
Status: DISCONTINUED
Start: 2019-06-05 | End: 2019-06-05 | Stop reason: HOSPADM

## 2019-06-05 RX ORDER — SODIUM CHLORIDE 9 MG/ML
INJECTION, SOLUTION INTRAVENOUS CONTINUOUS
Status: DISCONTINUED | OUTPATIENT
Start: 2019-06-05 | End: 2019-06-05 | Stop reason: HOSPADM

## 2019-06-05 RX ORDER — HYDROMORPHONE HYDROCHLORIDE 1 MG/ML
0.2 INJECTION, SOLUTION INTRAMUSCULAR; INTRAVENOUS; SUBCUTANEOUS EVERY 5 MIN PRN
Status: DISCONTINUED | OUTPATIENT
Start: 2019-06-05 | End: 2019-06-05 | Stop reason: HOSPADM

## 2019-06-05 RX ORDER — FENTANYL CITRATE 50 UG/ML
INJECTION, SOLUTION INTRAMUSCULAR; INTRAVENOUS
Status: DISCONTINUED | OUTPATIENT
Start: 2019-06-05 | End: 2019-06-05

## 2019-06-05 RX ORDER — HYDROCODONE BITARTRATE AND ACETAMINOPHEN 5; 325 MG/1; MG/1
1 TABLET ORAL EVERY 4 HOURS PRN
Status: DISCONTINUED | OUTPATIENT
Start: 2019-06-05 | End: 2019-06-05 | Stop reason: HOSPADM

## 2019-06-05 RX ORDER — CEFAZOLIN SODIUM 1 G/3ML
2 INJECTION, POWDER, FOR SOLUTION INTRAMUSCULAR; INTRAVENOUS
Status: COMPLETED | OUTPATIENT
Start: 2019-06-05 | End: 2019-06-05

## 2019-06-05 RX ORDER — CEPHALEXIN 500 MG/1
500 CAPSULE ORAL EVERY 12 HOURS
Qty: 6 CAPSULE | Refills: 0 | Status: SHIPPED | OUTPATIENT
Start: 2019-06-05 | End: 2019-06-08

## 2019-06-05 RX ORDER — HEPARIN SODIUM 1000 [USP'U]/ML
INJECTION, SOLUTION INTRAVENOUS; SUBCUTANEOUS
Status: COMPLETED
Start: 2019-06-05 | End: 2019-06-05

## 2019-06-05 RX ORDER — INDOMETHACIN 25 MG/1
CAPSULE ORAL
Status: COMPLETED
Start: 2019-06-05 | End: 2019-06-05

## 2019-06-05 RX ORDER — LIDOCAINE HYDROCHLORIDE 10 MG/ML
INJECTION INFILTRATION; PERINEURAL
Status: COMPLETED
Start: 2019-06-05 | End: 2019-06-05

## 2019-06-05 RX ORDER — LIDOCAINE HYDROCHLORIDE 10 MG/ML
INJECTION, SOLUTION EPIDURAL; INFILTRATION; INTRACAUDAL; PERINEURAL
Status: COMPLETED
Start: 2019-06-05 | End: 2019-06-05

## 2019-06-05 RX ORDER — KETOROLAC TROMETHAMINE 30 MG/ML
30 INJECTION, SOLUTION INTRAMUSCULAR; INTRAVENOUS ONCE
Status: DISCONTINUED | OUTPATIENT
Start: 2019-06-05 | End: 2019-06-05 | Stop reason: HOSPADM

## 2019-06-05 RX ORDER — BUPIVACAINE HYDROCHLORIDE 5 MG/ML
INJECTION, SOLUTION EPIDURAL; INTRACAUDAL
Status: COMPLETED
Start: 2019-06-05 | End: 2019-06-05

## 2019-06-05 RX ORDER — DEXTROSE MONOHYDRATE AND SODIUM CHLORIDE 5; .45 G/100ML; G/100ML
INJECTION, SOLUTION INTRAVENOUS CONTINUOUS
Status: DISCONTINUED | OUTPATIENT
Start: 2019-06-05 | End: 2019-06-05 | Stop reason: HOSPADM

## 2019-06-05 RX ORDER — PHENAZOPYRIDINE HYDROCHLORIDE 200 MG/1
TABLET, FILM COATED ORAL
Status: DISCONTINUED
Start: 2019-06-05 | End: 2019-06-05 | Stop reason: HOSPADM

## 2019-06-05 RX ORDER — DIAZEPAM 5 MG/1
5 TABLET ORAL 3 TIMES DAILY PRN
Qty: 60 TABLET | Refills: 0 | Status: SHIPPED | OUTPATIENT
Start: 2019-06-05 | End: 2019-07-16 | Stop reason: SDUPTHER

## 2019-06-05 RX ORDER — SODIUM CHLORIDE 0.9 % (FLUSH) 0.9 %
3 SYRINGE (ML) INJECTION
Status: DISCONTINUED | OUTPATIENT
Start: 2019-06-05 | End: 2019-06-05 | Stop reason: HOSPADM

## 2019-06-05 RX ORDER — HYDROCODONE BITARTRATE AND ACETAMINOPHEN 5; 325 MG/1; MG/1
1 TABLET ORAL EVERY 6 HOURS PRN
Qty: 7 TABLET | Refills: 0 | Status: SHIPPED | OUTPATIENT
Start: 2019-06-05 | End: 2019-07-24 | Stop reason: CLARIF

## 2019-06-05 RX ORDER — MEPERIDINE HYDROCHLORIDE 50 MG/ML
12.5 INJECTION INTRAMUSCULAR; INTRAVENOUS; SUBCUTANEOUS EVERY 10 MIN PRN
Status: DISCONTINUED | OUTPATIENT
Start: 2019-06-05 | End: 2019-06-05 | Stop reason: HOSPADM

## 2019-06-05 RX ORDER — MIDAZOLAM HYDROCHLORIDE 1 MG/ML
INJECTION, SOLUTION INTRAMUSCULAR; INTRAVENOUS
Status: DISCONTINUED | OUTPATIENT
Start: 2019-06-05 | End: 2019-06-05

## 2019-06-05 RX ADMIN — LIDOCAINE HYDROCHLORIDE 10 MG: 10 INJECTION, SOLUTION EPIDURAL; INFILTRATION; INTRACAUDAL; PERINEURAL at 08:06

## 2019-06-05 RX ADMIN — DEXTROSE AND SODIUM CHLORIDE: 5; .45 INJECTION, SOLUTION INTRAVENOUS at 08:06

## 2019-06-05 RX ADMIN — CEFAZOLIN 2 G: 330 INJECTION, POWDER, FOR SOLUTION INTRAMUSCULAR; INTRAVENOUS at 10:06

## 2019-06-05 RX ADMIN — PROPOFOL 100 MCG/KG/MIN: 10 INJECTION, EMULSION INTRAVENOUS at 10:06

## 2019-06-05 RX ADMIN — MIDAZOLAM HYDROCHLORIDE 2 MG: 1 INJECTION, SOLUTION INTRAMUSCULAR; INTRAVENOUS at 10:06

## 2019-06-05 RX ADMIN — HYDROMORPHONE HYDROCHLORIDE 0.2 MG: 1 INJECTION, SOLUTION INTRAMUSCULAR; INTRAVENOUS; SUBCUTANEOUS at 12:06

## 2019-06-05 RX ADMIN — HYDROCODONE BITARTRATE AND ACETAMINOPHEN 1 TABLET: 5; 325 TABLET ORAL at 12:06

## 2019-06-05 RX ADMIN — FENTANYL CITRATE 25 MCG: 50 INJECTION, SOLUTION INTRAMUSCULAR; INTRAVENOUS at 11:06

## 2019-06-05 RX ADMIN — LIDOCAINE HYDROCHLORIDE 100 MG: 20 INJECTION, SOLUTION INTRAVENOUS at 10:06

## 2019-06-05 RX ADMIN — PROPOFOL 50 MG: 10 INJECTION, EMULSION INTRAVENOUS at 10:06

## 2019-06-05 RX ADMIN — SODIUM CHLORIDE: 0.9 INJECTION, SOLUTION INTRAVENOUS at 10:06

## 2019-06-05 NOTE — PLAN OF CARE
Patient and spouse state they are ready to be discharged. Instructions and prescription given to patient and family. Both verbalize understanding. Patient tolerating po liquids with no difficulty. Patient states pain is at a tolerable level for them. Anesthesia consent and surgical consent in chart upon patient's discharge from [facility].

## 2019-06-05 NOTE — TRANSFER OF CARE
"Anesthesia Transfer of Care Note    Patient: Chelsea Oconnell    Procedure(s) Performed: Procedure(s) (LRB):  CYSTOSCOPY, WITH BLADDER HYDRODISTENSION AND BIOPSY (N/A)  INSTILLATION, BLADDER (N/A)  DILATION, URETHRA (N/A)    Patient location: PACU    Anesthesia Type: general    Transport from OR: Transported from OR on 6-10 L/min O2 by face mask with adequate spontaneous ventilation    Post pain: adequate analgesia    Post assessment: no apparent anesthetic complications and tolerated procedure well    Post vital signs: stable    Level of consciousness: awake    Nausea/Vomiting: no nausea/vomiting    Complications: none    Transfer of care protocol was followed      Last vitals:   Visit Vitals  /73   Pulse 81   Temp 36.9 °C (98.5 °F) (Oral)   Resp 18   Ht 5' 2" (1.575 m)   Wt 63.5 kg (140 lb)   SpO2 96%   Breastfeeding? No   BMI 25.61 kg/m²     "

## 2019-06-05 NOTE — INTERVAL H&P NOTE
The patient has been examined and the H&P has been reviewed:    I concur with the findings and no changes have occurred since H&P was written.     Urine dipstick today negative for all components    Anesthesia/Surgery risks, benefits and alternative options discussed and understood by patient/family.          Active Hospital Problems    Diagnosis  POA    Interstitial cystitis [N30.10]  Yes      Resolved Hospital Problems   No resolved problems to display.

## 2019-06-05 NOTE — DISCHARGE INSTRUCTIONS
Cystoscopy    Cystoscopy is a procedure that lets your doctor look directly inside your urethra and bladder. It can be used to:  · Help diagnose a problem with your urethra, bladder, or kidneys.  · Take a sample (biopsy) of bladder or urethral tissue.  · Treat certain problems (such as removing kidney stones).  · Place a stent to bypass an obstruction.  · Take special X-rays of the kidneys.  Based on the findings, your doctor may recommend other tests or treatments.  What is a cystoscope?  A cystoscope is a telescope-like instrument that contains lenses and fiberoptics (small glass wires that make bright light). The cystoscope may be straight and rigid, or flexible to bend around curves in the urethra. The doctor may look directly into the cystoscope, or project the image onto a monitor.  Getting ready  · Ask your doctor if you should stop taking any medicines before the procedure.  · Ask whether you should avoid eating or drinking anything after midnight before the procedure.  · Follow any other instructions your doctor gives you.  Tell your doctor before the exam if you:  · Take any medicines, such as aspirin or blood thinners  · Have allergies to any medicines  · Are pregnant   The procedure  Cystoscopy is done in the doctors office, surgery center, or hospital. The doctor and a nurse are present during the procedure. It takes only a few minutes, longer if a biopsy, X-ray, or treatment needs to be done.  During the procedure:  · You lie on an exam table on your back, knees bent and legs apart. You are covered with a drape.  · Your urethra and the area around it are washed. Anesthetic jelly may be applied to numb the urethra. Other pain medicine is usually not needed. In some cases, you may be offered a mild sedative to help you relax. If a more extensive procedure is to be done, such as a biopsy or kidney stone removal, general anesthesia may be needed.  · The cystoscope is inserted. A sterile fluid is put  into the bladder to expand it. You may feel pressure from this fluid.  · When the procedure is done, the cystoscope is removed.  After the procedure  If you had a sedative, general anesthesia, or spinal anesthesia, you must have someone drive you home. Once youre home:  · Drink plenty of fluids.  · You may have burning or light bleeding when you urinate--this is normal.  · Medicines may be prescribed to ease any discomfort or prevent infection. Take these as directed.  · Call your doctor if you have heavy bleeding or blood clots, burning that lasts more than a day, a fever over 100°F  (38° C), or trouble urinating.  Date Last Reviewed: 1/1/2017  © 1746-9134 [employer]. [address] [place] [address], [place] [address]. All rights reserved. This information is not intended as a substitute for professional medical care. Always follow your healthcare professional's instructions.

## 2019-06-05 NOTE — OP NOTE
Procedure Date:  06/05/2019    Pre-OP Diagnosis:   Difficulty in voiding, frequency and urgency, intermittency, incomplete bladder emptying, urethral / bladder pain  Post-OP Diagnosis:   Same, pelvic floor dysfunction  Procedure:   Complex Cystometrogram   Voiding / Pressure Study with Intrarectal Balloon   Complex Uroflow   Electromyogram of Anal Sphincter.   Fluoroscopy less than 1 hour    Surgeons:  Donny Calle    Anesthesia:  None    Findings:   Initial Uroflow study: voided 20 ml, PVR 0 ml, Qmax 2 ml/sec  --- Bladder ---   CYSTOMETROGRAM ( Filling Phase ):   Cystometric Numeric Data:   - First Desire (Sensation): 9 mL at 1 cm of water pressure.   - Normal Desire: 18 mL at 1 cm of water.   - Strong Desire: 31 mL at 1 cm of water.   - Urgency (Imminent Void) : 48 mL at 19 cm of water.   - Maximum Cystometric Capacity: 85 mL at 25 cm of water pressure.   Compliance:   - low.   Leak Point Pressure:   - Valsalva ( Abdominal ) Leak Point Pressure: none.   UROFLOW:   - Voided Volume: unable to void, catheter removed but still unable to void.  At the end, she uses a bathroom, urinated 100 mL.   - Residual Urine: more than 100 mL.   - Maximum Flow Rate: n/a mL/sec.   - Flow Pattern: n/a but suspect an intermittent, abdominal straining.   VOIDING PRESSURE STUDY ( Voiding Phase ):   Detrusor Pressure:   - Maximum Detrusor Pressure: na.   - Detrusor Pressure at Maximum Flow: na.   - Detrusor Contraction Characteristics: na .   ELECTROMYOGRAM:   - unable to relax at the time of voiding  CONCLUSIONS:   1. Pelvic floor dysfunction  2. Chronic pelvic pain    Proceed with cysto under anesthesia with hydrodistention, urethral dilation.  Consider Physical Therapy, flomax and valium, possible InterStim Therapy    Description of Procedure:   Informed Consent:   - Risks, benefits and alternatives of procedure discussed with   patient and informed consent obtained.   --- Urodynamic Studies ---   Procedure Details:   Cystometrogram:    - Catheter(s) passed into the bladder.   - Rectal balloon inserted.   - Catheter(s) connected to infusion medium and to pressure recording   device.   - Infusion Rate: 30 mL / min.   Electromyogram:   - Perineal electromyogram pad placed and connected to electromyogram   recording device.   Equipment:   - Catheters: Double lumen catheter.   - Medium: Liquid.   - Pressure Recording Device: Calibrated electronic equipment.   Complications:   No immediate complications.     Post-op Plan:  Patient was discharged home in a stable condition.  Medications: none  Follow up:  cysto

## 2019-06-05 NOTE — OP NOTE
[facility]  Operative Note    Date: 06/05/2019    Pre-Op Diagnosis: interstitial cystitis    Patient Active Problem List    Diagnosis Date Noted    Interstitial cystitis 06/05/2019    IC (interstitial cystitis) 05/07/2019    Bladder pain 05/07/2019    OAB (overactive bladder) 05/07/2019       Post-Op Diagnosis: same    Procedure(s) Performed:   1.  Cystoscopy with hydrodistension  2.  Urethral dilation  3.  Bladder instillation    Specimen(s): none    Staff Surgeon: Donny Calle MD    Assistant Surgeon: Aubrey Boudreaux MD    Anesthesia: General mask inhalational anesthesia    Indications: Chelsea Oconnell is a 38 y.o. female with interstitial cystitis presenting for hydrodistension.      Findings:     - 1L anesthetic bladder capacity  - Diffuse bladder erythema, minimal glomerulations, no Hunner lesions  - Urethra dilated to 32 Fr with urethral sounds    Estimated Blood Loss: min    Drains: 16 Fr Gomez catheter    Procedure in detail: After risks, benefits and possible complications of hydro distention were discussed with the patient informed consent was obtained. All questions were answered in the pre-operative area.  The patient was transferred to the cystoscopy suite and placed in the supine position.  SCDs were applied and working.  Anesthesia was administered.  After adequate anesthesia the patient was placed in the dorsal lithotomy position and prepped and draped in the usual sterile fashion. Time out was preformed and kerrie-procedural antibiotics were confirmed.     A rigid cystoscope in a 22 Fr sheath was introduced into the bladder per urethra. This passed easily.  The entire urethra was visualized which showed no masses or strictures.  The right and left ureteral orifices were identified in the normal anatomic position and were seen effluxing clear urine.  Formal cystoscopy was performed with a 30 degree lens, which revealed no masses or lesions suspicious for malignancy, no  trabeculations, no bladder stones and no bladder diverticuli.     The bladder was then filled with sterile water until equilibrium was reached at 70 cm of water. The capacity of the bladder was 1L. There was not terminal hematuria seen. There was not ulcerations seen. There was not glomerulizations seen.    The urethra was dilated serially from 24 Fr to 32 Fr using female urethral sounds.     The bladder was drained and the cystoscope removed.  A 16 Fr barrientos catheter was placed.  A 200mL concoction of 40 000 U heparin, 1 % lidocaine, 0.5% bupivicaine, and 8.4% NaHCO3 was instilled into the bladder with instructions to keep the instillation in the bladder for at least 30 minutes postoperatively.      The patient was removed from lithotomy and transferred to recovery in stable condition.    Disposition:  The patient will follow up with Dr. Calle in 6 weeks.  Prescriptions were given for norco, valium, and keflex.      Aubrey Boudreaux MD

## 2019-06-05 NOTE — CARE UPDATE
Patient tolerated CT with contrast well. Denies any s/s of reaction. VS stable and sl removed for d/c.

## 2019-06-05 NOTE — ANESTHESIA POSTPROCEDURE EVALUATION
Anesthesia Post Evaluation    Patient: Chelsea Oconnell    Procedure(s) Performed: Procedure(s) (LRB):  CYSTOSCOPY, WITH BLADDER HYDRODISTENSION AND BIOPSY (N/A)  INSTILLATION, BLADDER (N/A)  DILATION, URETHRA (N/A)    Final Anesthesia Type: general  Patient location during evaluation: PACU  Patient participation: Yes- Able to Participate  Level of consciousness: awake and alert and oriented  Post-procedure vital signs: reviewed and stable  Pain management: pain needs to be addressed (giving dilaudid)  Airway patency: patent  PONV status at discharge: No PONV  Anesthetic complications: no      Cardiovascular status: stable  Respiratory status: unassisted, spontaneous ventilation and room air  Hydration status: euvolemic  Follow-up not needed.          Vitals Value Taken Time   /68 6/5/2019 11:47 AM   Temp 36.9 °C (98.5 °F) 6/5/2019  7:56 AM   Pulse 68 6/5/2019 12:07 PM   Resp 18 6/5/2019  7:56 AM   SpO2 98 % 6/5/2019 12:07 PM   Vitals shown include unvalidated device data.      No case tracking events are documented in the log.      Pain/Tatum Score: Tatum Score: 10 (6/5/2019 11:25 AM)

## 2019-06-05 NOTE — DISCHARGE SUMMARY
[facility]  Discharge Note  Short Stay    Admit Date: 6/5/2019    Discharge Date and Time: 06/05/2019 11:24 AM      Attending Physician: Donny Calle MD     Discharge Provider: Aubrey Boudreaux    Diagnoses:  Active Hospital Problems    Diagnosis  POA    Interstitial cystitis [N30.10]  Yes      Resolved Hospital Problems   No resolved problems to display.       Discharged Condition: good    Hospital Course: Patient was admitted for cystoscopy, hydro distension, bladder instillation and tolerated the procedure well with no complications. The patient was discharged home in good condition on the same day.       Final Diagnoses: Same as principal problem.    Disposition: Home or Self Care    Follow up/Patient Instructions:    Medications:  Reconciled Home Medications:   Current Discharge Medication List      START taking these medications    Details   cephALEXin (KEFLEX) 500 MG capsule Take 1 capsule (500 mg total) by mouth every 12 (twelve) hours. for 3 days  Qty: 6 capsule, Refills: 0      diazePAM (VALIUM) 5 MG tablet Take 1 tablet (5 mg total) by mouth 3 (three) times daily as needed (pelvic floor dysfunction).  Qty: 60 tablet, Refills: 0      HYDROcodone-acetaminophen (NORCO) 5-325 mg per tablet Take 1 tablet by mouth every 6 (six) hours as needed for Pain.  Qty: 7 tablet, Refills: 0         CONTINUE these medications which have NOT CHANGED    Details   amitriptyline (ELAVIL) 25 MG tablet Take 1 tablet (25 mg total) by mouth every evening. 1 to 2 tablets at bed time to help with bladder pain and sleeping  Qty: 60 tablet, Refills: 12    Associated Diagnoses: Pelvic pain      cyanocobalamin (VITAMIN B-12) 1000 MCG tablet Take 100 mcg by mouth every 12 (twelve) hours.      estradiol (ESTRACE) 2 MG tablet       hydrOXYzine HCl (ATARAX) 25 MG tablet Take 1 tablet (25 mg total) by mouth nightly.  Qty: 30 tablet, Refills: 12    Associated Diagnoses: IC (interstitial cystitis)      ibuprofen (ADVIL,MOTRIN) 200 MG  tablet Take 200 mg by mouth every 6 (six) hours as needed for Pain.      Lactobacillus rhamnosus GG (CULTURELLE) 10 billion cell capsule Take 1 capsule by mouth once daily.      omega-3 acid ethyl esters (LOVAZA) 1 gram capsule Take 1 g by mouth once daily.      tamsulosin (FLOMAX) 0.4 mg Cap Take 1 capsule (0.4 mg total) by mouth every evening.  Qty: 30 capsule, Refills: 11    Associated Diagnoses: OAB (overactive bladder); Pelvic pain      venlafaxine (EFFEXOR-XR) 75 MG 24 hr capsule       calcium glycerophosphate (PRELIEF ORAL) Take 2 mg by mouth once daily.      conjugated estrogens (PREMARIN) vaginal cream Place vaginally.      LORazepam (ATIVAN) 1 MG tablet Take 1 tablet (1 mg total) by mouth every 6 (six) hours as needed for Anxiety.  Qty: 30 tablet, Refills: 0    Associated Diagnoses: Pelvic pain      tolterodine (DETROL LA) 4 MG 24 hr capsule Take 4 mg by mouth once daily.           Discharge Procedure Orders   Diet general     Call MD for:  temperature >100.4     Call MD for:  persistent nausea and vomiting     Call MD for:  severe uncontrolled pain     Call MD for:  difficulty breathing, headache or visual disturbances     Call MD for:  hives     Call MD for:  persistent dizziness or light-headedness     Call MD for:  extreme fatigue     Follow-up Information     Donny Calle MD In 6 weeks.    Specialty:  Urology  Contact information:  [address]  [facility] [address]  [phone]

## 2019-06-05 NOTE — ANESTHESIA RELEASE NOTE
"Anesthesia Release from PACU Note    Patient: Chelsea Oconnell    Procedure(s) Performed: Procedure(s) (LRB):  CYSTOSCOPY, WITH BLADDER HYDRODISTENSION AND BIOPSY (N/A)  INSTILLATION, BLADDER (N/A)  DILATION, URETHRA (N/A)    Anesthesia type: GEN    Post pain: nurse giving dilaudid    Post assessment: no apparent anesthetic complications, tolerated procedure well and no evidence of recall    Post vital signs: /73   Pulse 81   Temp 36.9 °C (98.5 °F) (Oral)   Resp 18   Ht 5' 2" (1.575 m)   Wt 63.5 kg (140 lb)   SpO2 96%   Breastfeeding? No   BMI 25.61 kg/m²     Level of consciousness: awake, alert and oriented    Nausea/Vomiting: no nausea/no vomiting    Complications: none    Airway Patency: patent    Respiratory: unassisted, spontaneous ventilation, room air    Cardiovascular: stable and blood pressure at baseline    Hydration: euvolemic    "

## 2019-06-06 VITALS
WEIGHT: 140 LBS | SYSTOLIC BLOOD PRESSURE: 121 MMHG | DIASTOLIC BLOOD PRESSURE: 81 MMHG | HEART RATE: 64 BPM | OXYGEN SATURATION: 100 % | RESPIRATION RATE: 18 BRPM | HEIGHT: 62 IN | BODY MASS INDEX: 25.76 KG/M2 | TEMPERATURE: 98 F

## 2019-06-10 ENCOUNTER — TELEPHONE (OUTPATIENT)
Dept: UROLOGY | Facility: CLINIC | Age: 39
End: 2019-06-10

## 2019-06-10 NOTE — TELEPHONE ENCOUNTER
----- Message from Kateryna Patterson sent at 6/10/2019  2:50 PM CDT -----  Contact: pt   Patient Requesting Sooner Appointment.     Reason for sooner appt.: post op  When is the first available appointment? 8/6/19  Communication Preference: [phone]  Additional Information: pt stated Dr. Calle want to see her in 5 weeks, which woud be July 10. No appts available until 8/6/19

## 2019-07-16 ENCOUNTER — OFFICE VISIT (OUTPATIENT)
Dept: UROLOGY | Facility: CLINIC | Age: 39
End: 2019-07-16
Payer: COMMERCIAL

## 2019-07-16 ENCOUNTER — TELEPHONE (OUTPATIENT)
Dept: UROLOGY | Facility: CLINIC | Age: 39
End: 2019-07-16

## 2019-07-16 VITALS
HEART RATE: 116 BPM | SYSTOLIC BLOOD PRESSURE: 109 MMHG | BODY MASS INDEX: 27.34 KG/M2 | DIASTOLIC BLOOD PRESSURE: 76 MMHG | WEIGHT: 148.56 LBS | HEIGHT: 62 IN

## 2019-07-16 DIAGNOSIS — N32.81 OAB (OVERACTIVE BLADDER): ICD-10-CM

## 2019-07-16 DIAGNOSIS — R10.2 PELVIC PAIN: ICD-10-CM

## 2019-07-16 DIAGNOSIS — R39.89 BLADDER PAIN: ICD-10-CM

## 2019-07-16 DIAGNOSIS — N32.81 OAB (OVERACTIVE BLADDER): Primary | ICD-10-CM

## 2019-07-16 DIAGNOSIS — R33.9 INCOMPLETE BLADDER EMPTYING: ICD-10-CM

## 2019-07-16 DIAGNOSIS — R33.9 INCOMPLETE BLADDER EMPTYING: Primary | ICD-10-CM

## 2019-07-16 DIAGNOSIS — R10.2 PELVIC PAIN IN FEMALE: ICD-10-CM

## 2019-07-16 PROCEDURE — 3008F PR BODY MASS INDEX (BMI) DOCUMENTED: ICD-10-PCS | Mod: CPTII,S$GLB,, | Performed by: UROLOGY

## 2019-07-16 PROCEDURE — 99215 OFFICE O/P EST HI 40 MIN: CPT | Mod: 57,S$GLB,, | Performed by: UROLOGY

## 2019-07-16 PROCEDURE — 99215 PR OFFICE/OUTPT VISIT, EST, LEVL V, 40-54 MIN: ICD-10-PCS | Mod: 57,S$GLB,, | Performed by: UROLOGY

## 2019-07-16 PROCEDURE — 3008F BODY MASS INDEX DOCD: CPT | Mod: CPTII,S$GLB,, | Performed by: UROLOGY

## 2019-07-16 PROCEDURE — 99999 PR PBB SHADOW E&M-EST. PATIENT-LVL IV: ICD-10-PCS | Mod: PBBFAC,,, | Performed by: UROLOGY

## 2019-07-16 PROCEDURE — 99999 PR PBB SHADOW E&M-EST. PATIENT-LVL IV: CPT | Mod: PBBFAC,,, | Performed by: UROLOGY

## 2019-07-16 RX ORDER — AMITRIPTYLINE HYDROCHLORIDE 25 MG/1
25 TABLET, FILM COATED ORAL NIGHTLY
Qty: 30 TABLET | Refills: 11 | Status: SHIPPED | OUTPATIENT
Start: 2019-07-16 | End: 2019-07-18 | Stop reason: CLARIF

## 2019-07-16 RX ORDER — GABAPENTIN 300 MG/1
300 CAPSULE ORAL 3 TIMES DAILY
Qty: 90 CAPSULE | Refills: 11 | Status: SHIPPED | OUTPATIENT
Start: 2019-07-16 | End: 2021-08-20 | Stop reason: SDUPTHER

## 2019-07-16 RX ORDER — DIAZEPAM 5 MG/1
5 TABLET ORAL 3 TIMES DAILY PRN
Qty: 60 TABLET | Refills: 0 | Status: SHIPPED | OUTPATIENT
Start: 2019-07-16 | End: 2020-10-13

## 2019-07-16 NOTE — TELEPHONE ENCOUNTER
Incomplete bladder emptying  -     Case Request Operating Room: INSERTION, NEUROSTIMULATOR, TEMPORARY, SACRAL FLUOR    OAB (overactive bladder)  -     Case Request Operating Room: INSERTION, NEUROSTIMULATOR, TEMPORARY, SACRAL FLUOR

## 2019-07-16 NOTE — H&P (VIEW-ONLY)
CC: refractory IC ( bladder pain), want to get her bladder removed!    Chelsea Oconnell is a 38 y.o. woman who is here for the evaluation of Post-op Evaluation    Initially saw her on 5/7/19 by her urologist in [place], [place] for refractory IC.  She lives in [place], [place] and has seen many urologists over the years.  She presents with at least 5 year hx of refractory bladder and pelvic pain with frequency, urgency, difficulty of emptying bladder.  Underwent many treatment for her urinary problems but with minimal improvement.  C/o constant pain in the bladder and pelvic area.  She wishes to get her bladder removed but her urologist in [place] did not do such surgery and refer her to me.  Had botox injection to the bladder but developed urinary retention and has done CIC for 1 year.  Now she is able to urinate but only small volume at a time with straining.    Urination symptoms: Positive for frequency, urgency, nocturia and incomplete emptying, straining.  Denies flank pain     Had 5 c-sections, 4 to 5 Gyn surgeries including hysterectomy ( no malignancy).  S/p removal of liver hemangioma.    So underwent FUDS and cysto under anesthesia on 6/5/19 by me.    Procedure Date:  06/05/2019  Pre-OP Diagnosis:   Difficulty in voiding, frequency and urgency, intermittency, incomplete bladder emptying, urethral / bladder pain  Post-OP Diagnosis:   Same, pelvic floor dysfunction  Procedure:   Complex Cystometrogram   Voiding / Pressure Study with Intrarectal Balloon   Complex Uroflow   Electromyogram of Anal Sphincter.   Fluoroscopy less than 1 hour  Findings:   Initial Uroflow study: voided 20 ml, PVR 0 ml, Qmax 2 ml/sec  --- Bladder ---   CYSTOMETROGRAM ( Filling Phase ):   Cystometric Numeric Data:   - First Desire (Sensation): 9 mL at 1 cm of water pressure.   - Normal Desire: 18 mL at 1 cm of water.   - Strong Desire: 31 mL at 1 cm of water.   - Urgency (Imminent Void) : 48 mL at 19 cm of water.   - Maximum Cystometric  Capacity: 85 mL at 25 cm of water pressure.   Compliance:   - low.   Leak Point Pressure:   - Valsalva ( Abdominal ) Leak Point Pressure: none.   UROFLOW:   - Voided Volume: unable to void, catheter removed but still unable to void.  At the end, she uses a bathroom, urinated 100 mL.   - Residual Urine: more than 100 mL.   - Maximum Flow Rate: n/a mL/sec.   - Flow Pattern: n/a but suspect an intermittent, abdominal straining.   VOIDING PRESSURE STUDY ( Voiding Phase ):   Detrusor Pressure:   - Maximum Detrusor Pressure: na.   - Detrusor Pressure at Maximum Flow: na.   - Detrusor Contraction Characteristics: na .   ELECTROMYOGRAM:   - unable to relax at the time of voiding  CONCLUSIONS:   1. Pelvic floor dysfunction  2. Chronic pelvic pain  3. Incomplete bladder emptying     Proceed with cysto under anesthesia with hydrodistention, urethral dilation.  Consider Physical Therapy, flomax and valium, possible InterStim Therapy    Procedure(s) Performed: 6/5/19  1.  Cystoscopy with hydrodistension  2.  Urethral dilation  3.  Bladder instillation  Findings:   - 1L anesthetic bladder capacity  - Diffuse bladder erythema, minimal glomerulations, no Hunner lesions  - Urethra dilated to 32 Fr with urethral sounds    Since her cystoscopic surgery, she is able to void somewhat better on flomax.  Was unable to undergo physical therapy due to her insurance coverage.  Vaginal suppository somewhat helpful, but is not able to use valium into the vagina due to recurrent yeast infection.  She has taken valium orally at night which has helped her.  Has been taking ibuprofen 200 mg 3 to 4 pills at a time 2 to 3 x a day for chronic pelvic pain.    C/o constant pelvic and urethral pain regardless whether her bladder is full or not.  She reports that she does not feel emptying her bladder completely.  She voids at least every 1 hour.  Has been on Uribel at least every other day due to her chronic bladder pain.      Past Medical History:    Diagnosis Date    Lupus (systemic lupus erythematosus)      Past Surgical History:   Procedure Laterality Date    CYSTOSCOPY, WITH BLADDER HYDRODISTENSION AND BIOPSY N/A 6/5/2019    [facility]    DILATION, URETHRA N/A 6/5/2019    [facility]    HYSTERECTOMY      INSTILLATION, BLADDER N/A 6/5/2019    [facility]    liver      hemangioma removed    URODYNAMIC STUDY, FLUOROSCOPIC N/A 6/5/2019    [facility]     Social History     Tobacco Use    Smoking status: Never Smoker    Smokeless tobacco: Never Used   Substance Use Topics    Alcohol use: Never     Frequency: Never    Drug use: Never     No family history on file.  Allergy:  Review of patient's allergies indicates:   Allergen Reactions    Iodine and iodide containing products Rash and Swelling    Shellfish containing products Rash and Swelling    Sulfa (sulfonamide antibiotics) Rash and Swelling    Xanax [alprazolam] Hives     Outpatient Encounter Medications as of 7/16/2019   Medication Sig Dispense Refill    cyanocobalamin (VITAMIN B-12) 1000 MCG tablet Take 100 mcg by mouth every 12 (twelve) hours.      diazePAM (VALIUM) 5 MG tablet Take 1 tablet (5 mg total) by mouth 3 (three) times daily as needed (pelvic floor dysfunction). 60 tablet 0    estradiol (ESTRACE) 2 MG tablet       ibuprofen (ADVIL,MOTRIN) 200 MG tablet Take 200 mg by mouth every 6 (six) hours as needed for Pain.      methen-m.blue-s.phos-phsal-hyo (URIBEL) 118-10-40.8-36 mg Cap Take 1 capsule by mouth once daily. 60 capsule 0    omega-3 acid ethyl esters (LOVAZA) 1 gram capsule Take 1 g by mouth once daily.      venlafaxine (EFFEXOR-XR) 75 MG 24 hr capsule       [DISCONTINUED] diazePAM (VALIUM) 5 MG tablet Take 1 tablet (5 mg total) by mouth 3 (three) times daily as needed (pelvic floor dysfunction). 60 tablet 0     amitriptyline (ELAVIL) 25 MG tablet Take 1 tablet (25 mg total) by mouth every evening. 1 to 2 tablets at bed time to help with bladder pain and sleeping 30 tablet 11    calcium glycerophosphate (PRELIEF ORAL) Take 2 mg by mouth once daily.      conjugated estrogens (PREMARIN) vaginal cream Place vaginally.      gabapentin (NEURONTIN) 300 MG capsule Take 1 capsule (300 mg total) by mouth 3 (three) times daily. 90 capsule 11    HYDROcodone-acetaminophen (NORCO) 5-325 mg per tablet Take 1 tablet by mouth every 6 (six) hours as needed for Pain. 7 tablet 0    Lactobacillus rhamnosus GG (CULTURELLE) 10 billion cell capsule Take 1 capsule by mouth once daily.      LORazepam (ATIVAN) 1 MG tablet Take 1 tablet (1 mg total) by mouth every 6 (six) hours as needed for Anxiety. 30 tablet 0    tamsulosin (FLOMAX) 0.4 mg Cap Take 1 capsule (0.4 mg total) by mouth every evening. 30 capsule 11    [DISCONTINUED] amitriptyline (ELAVIL) 25 MG tablet Take 1 tablet (25 mg total) by mouth every evening. 1 to 2 tablets at bed time to help with bladder pain and sleeping 60 tablet 12    [DISCONTINUED] tolterodine (DETROL LA) 4 MG 24 hr capsule Take 4 mg by mouth once daily.       Facility-Administered Encounter Medications as of 7/16/2019   Medication Dose Route Frequency Provider Last Rate Last Dose    hydrocortisone sodium succinate injection 100 mg  100 mg Intramuscular Once Donny H. MD Luc         Review of Systems   ROS  Physical Exam     Vitals:    07/16/19 1040   BP: 109/76   Pulse: (!) 116     Physical Exam   Constitutional: She is oriented to person, place, and time. She appears well-developed and well-nourished. No distress.   HENT:   Head: Normocephalic and atraumatic.   Right Ear: External ear normal.   Left Ear: External ear normal.   Nose: Nose normal.   Eyes: Conjunctivae and EOM are normal. Pupils are equal, round, and reactive to light. No scleral icterus.   Neck: Normal range of motion. Neck supple. No JVD  present. No tracheal deviation present. No thyromegaly present.   Cardiovascular: Normal rate, regular rhythm, normal heart sounds and intact distal pulses.  Exam reveals no gallop and no friction rub.    No murmur heard.  Pulmonary/Chest: Effort normal and breath sounds normal. No respiratory distress. She has no wheezes.   Abdominal: Soft. Bowel sounds are normal. She exhibits no distension and no mass. There is no tenderness. There is no rebound and no guarding.   Genitourinary: Vagina normal and uterus normal. No vaginal discharge found.   Genitourinary Comments: No tenderness at the urethra.  Positive tenderness at the kerrie-urethral area on the both sides.  Positive tenderness on palpation each side of vaginal wall.  No tenderness on palpation at the base of the bladder.    Difficulty in localizing the levator ani on YEE of rectum.  Slight tenderness noted on the levator ani.   Musculoskeletal: Normal range of motion. She exhibits no edema, tenderness or deformity.   Lymphadenopathy:     She has no cervical adenopathy.   Neurological: She is alert and oriented to person, place, and time.   Skin: Skin is warm and dry. She is not diaphoretic.     Psychiatric: She has a normal mood and affect. Her behavior is normal. Thought content normal.         LABS:  Lab Results   Component Value Date    CREATININE 0.8 05/07/2019     Urine Culture, Routine   Date Value Ref Range Status   05/07/2019 No growth  Final     UA clear  PVR per cath 120 ml    Assessment and Plan:  Chelsea was seen today for post-op evaluation.    Diagnoses and all orders for this visit:    OAB (overactive bladder)    Incomplete bladder emptying    Pelvic pain in female  -     diazePAM (VALIUM) 5 MG tablet; Take 1 tablet (5 mg total) by mouth 3 (three) times daily as needed (pelvic floor dysfunction).  -     gabapentin (NEURONTIN) 300 MG capsule; Take 1 capsule (300 mg total) by mouth 3 (three) times daily.    Bladder pain    Pelvic pain  -      amitriptyline (ELAVIL) 25 MG tablet; Take 1 tablet (25 mg total) by mouth every evening. 1 to 2 tablets at bed time to help with bladder pain and sleeping    I explained that InterStim Therapy for Urinary Control is indicated for the treatment of urinary retention and the symptoms of overactive bladder, including urinary urge incontinence and significant symptoms of urgency-frequency alone or in combination, in patients who have failed or could not tolerate more conservative treatments.    Options of sacral nerve test stimulation by PNE (percutaneous lead) vs. Stage I InterStim Therapy ( implanted electrode) explained. Once the test stimulation results in a successful response, either a complete InterStim Therapy ( both stage I and II) or Stage II InterStim Therapy ( implantation of InterStim neuro-generator) will be scheduled.    Will schedule stage I for sacral nerve test stimulation to see whether or not it will improve this patient's refractory urinary symptoms.    Nature and risks of the procedure including, but not limited to pain, bleeding, infection, failure to improve urinary symptoms, lead migration, electrical shock, nerve injury, or mechanical failure explained. All questions were answered.    Patient understands and agrees to undergo the proposed procedure. InterStim brochure given.  All questions answered.    Continue flomax, elavil, valium prn.  She will do a voiding diary to establish her baseline.    She experienced significant improvement of her bladder pain after bladder instillation ( from 6/10 to 2/10 on visual scale, except pain at the urethra following the catheterization)    I spent 40 minutes with the patient of which more than half was spent in direct consultation with the patient in regards to our treatment and plan.    Follow-up:  Follow up for stage I Instim Therapy.

## 2019-07-16 NOTE — PROGRESS NOTES
CC: refractory IC ( bladder pain), want to get her bladder removed!    Chelsea Oconnell is a 38 y.o. woman who is here for the evaluation of Post-op Evaluation    Initially saw her on 5/7/19 by her urologist in [place], [place] for refractory IC.  She lives in [place], [place] and has seen many urologists over the years.  She presents with at least 5 year hx of refractory bladder and pelvic pain with frequency, urgency, difficulty of emptying bladder.  Underwent many treatment for her urinary problems but with minimal improvement.  C/o constant pain in the bladder and pelvic area.  She wishes to get her bladder removed but her urologist in [place] did not do such surgery and refer her to me.  Had botox injection to the bladder but developed urinary retention and has done CIC for 1 year.  Now she is able to urinate but only small volume at a time with straining.    Urination symptoms: Positive for frequency, urgency, nocturia and incomplete emptying, straining.  Denies flank pain     Had 5 c-sections, 4 to 5 Gyn surgeries including hysterectomy ( no malignancy).  S/p removal of liver hemangioma.    So underwent FUDS and cysto under anesthesia on 6/5/19 by me.    Procedure Date:  06/05/2019  Pre-OP Diagnosis:   Difficulty in voiding, frequency and urgency, intermittency, incomplete bladder emptying, urethral / bladder pain  Post-OP Diagnosis:   Same, pelvic floor dysfunction  Procedure:   Complex Cystometrogram   Voiding / Pressure Study with Intrarectal Balloon   Complex Uroflow   Electromyogram of Anal Sphincter.   Fluoroscopy less than 1 hour  Findings:   Initial Uroflow study: voided 20 ml, PVR 0 ml, Qmax 2 ml/sec  --- Bladder ---   CYSTOMETROGRAM ( Filling Phase ):   Cystometric Numeric Data:   - First Desire (Sensation): 9 mL at 1 cm of water pressure.   - Normal Desire: 18 mL at 1 cm of water.   - Strong Desire: 31 mL at 1 cm of water.   - Urgency (Imminent Void) : 48 mL at 19 cm of water.   - Maximum Cystometric  Capacity: 85 mL at 25 cm of water pressure.   Compliance:   - low.   Leak Point Pressure:   - Valsalva ( Abdominal ) Leak Point Pressure: none.   UROFLOW:   - Voided Volume: unable to void, catheter removed but still unable to void.  At the end, she uses a bathroom, urinated 100 mL.   - Residual Urine: more than 100 mL.   - Maximum Flow Rate: n/a mL/sec.   - Flow Pattern: n/a but suspect an intermittent, abdominal straining.   VOIDING PRESSURE STUDY ( Voiding Phase ):   Detrusor Pressure:   - Maximum Detrusor Pressure: na.   - Detrusor Pressure at Maximum Flow: na.   - Detrusor Contraction Characteristics: na .   ELECTROMYOGRAM:   - unable to relax at the time of voiding  CONCLUSIONS:   1. Pelvic floor dysfunction  2. Chronic pelvic pain  3. Incomplete bladder emptying     Proceed with cysto under anesthesia with hydrodistention, urethral dilation.  Consider Physical Therapy, flomax and valium, possible InterStim Therapy    Procedure(s) Performed: 6/5/19  1.  Cystoscopy with hydrodistension  2.  Urethral dilation  3.  Bladder instillation  Findings:   - 1L anesthetic bladder capacity  - Diffuse bladder erythema, minimal glomerulations, no Hunner lesions  - Urethra dilated to 32 Fr with urethral sounds    Since her cystoscopic surgery, she is able to void somewhat better on flomax.  Was unable to undergo physical therapy due to her insurance coverage.  Vaginal suppository somewhat helpful, but is not able to use valium into the vagina due to recurrent yeast infection.  She has taken valium orally at night which has helped her.  Has been taking ibuprofen 200 mg 3 to 4 pills at a time 2 to 3 x a day for chronic pelvic pain.    C/o constant pelvic and urethral pain regardless whether her bladder is full or not.  She reports that she does not feel emptying her bladder completely.  She voids at least every 1 hour.  Has been on Uribel at least every other day due to her chronic bladder pain.      Past Medical History:    Diagnosis Date    Lupus (systemic lupus erythematosus)      Past Surgical History:   Procedure Laterality Date    CYSTOSCOPY, WITH BLADDER HYDRODISTENSION AND BIOPSY N/A 6/5/2019    [facility]formed [facility] at [facility]    DILATION, URETHRA N/A 6/5/2019    [facility] at [facility]    HYSTERECTOMY      INSTILLATION, BLADDER N/A 6/5/2019    [facility]    liver      hemangioma removed    URODYNAMIC STUDY, FLUOROSCOPIC N/A 6/5/2019    [facility] at [facility] OR [facility]     Social History     Tobacco Use    Smoking status: Never Smoker    Smokeless tobacco: Never Used   Substance Use Topics    Alcohol use: Never     Frequency: Never    Drug use: Never     No family history on file.  Allergy:  Review of patient's allergies indicates:   Allergen Reactions    Iodine and iodide containing products Rash and Swelling    Shellfish containing products Rash and Swelling    Sulfa (sulfonamide antibiotics) Rash and Swelling    Xanax [alprazolam] Hives     Outpatient Encounter Medications as of 7/16/2019   Medication Sig Dispense Refill    cyanocobalamin (VITAMIN B-12) 1000 MCG tablet Take 100 mcg by mouth every 12 (twelve) hours.      diazePAM (VALIUM) 5 MG tablet Take 1 tablet (5 mg total) by mouth 3 (three) times daily as needed (pelvic floor dysfunction). 60 tablet 0    estradiol (ESTRACE) 2 MG tablet       ibuprofen (ADVIL,MOTRIN) 200 MG tablet Take 200 mg by mouth every 6 (six) hours as needed for Pain.      methen-m.blue-s.phos-phsal-hyo (URIBEL) 118-10-40.8-36 mg Cap Take 1 capsule by mouth once daily. 60 capsule 0    omega-3 acid ethyl esters (LOVAZA) 1 gram capsule Take 1 g by mouth once daily.      venlafaxine (EFFEXOR-XR) 75 MG 24 hr capsule       [DISCONTINUED] diazePAM (VALIUM) 5 MG tablet Take 1 tablet (5 mg total) by mouth 3 (three) times daily as needed (pelvic floor dysfunction). 60 tablet 0     amitriptyline (ELAVIL) 25 MG tablet Take 1 tablet (25 mg total) by mouth every evening. 1 to 2 tablets at bed time to help with bladder pain and sleeping 30 tablet 11    calcium glycerophosphate (PRELIEF ORAL) Take 2 mg by mouth once daily.      conjugated estrogens (PREMARIN) vaginal cream Place vaginally.      gabapentin (NEURONTIN) 300 MG capsule Take 1 capsule (300 mg total) by mouth 3 (three) times daily. 90 capsule 11    HYDROcodone-acetaminophen (NORCO) 5-325 mg per tablet Take 1 tablet by mouth every 6 (six) hours as needed for Pain. 7 tablet 0    Lactobacillus rhamnosus GG (CULTURELLE) 10 billion cell capsule Take 1 capsule by mouth once daily.      LORazepam (ATIVAN) 1 MG tablet Take 1 tablet (1 mg total) by mouth every 6 (six) hours as needed for Anxiety. 30 tablet 0    tamsulosin (FLOMAX) 0.4 mg Cap Take 1 capsule (0.4 mg total) by mouth every evening. 30 capsule 11    [DISCONTINUED] amitriptyline (ELAVIL) 25 MG tablet Take 1 tablet (25 mg total) by mouth every evening. 1 to 2 tablets at bed time to help with bladder pain and sleeping 60 tablet 12    [DISCONTINUED] tolterodine (DETROL LA) 4 MG 24 hr capsule Take 4 mg by mouth once daily.       Facility-Administered Encounter Medications as of 7/16/2019   Medication Dose Route Frequency Provider Last Rate Last Dose    hydrocortisone sodium succinate injection 100 mg  100 mg Intramuscular Once Donny H. MD Luc         Review of Systems   ROS  Physical Exam     Vitals:    07/16/19 1040   BP: 109/76   Pulse: (!) 116     Physical Exam   Constitutional: She is oriented to person, place, and time. She appears well-developed and well-nourished. No distress.   HENT:   Head: Normocephalic and atraumatic.   Right Ear: External ear normal.   Left Ear: External ear normal.   Nose: Nose normal.   Eyes: Conjunctivae and EOM are normal. Pupils are equal, round, and reactive to light. No scleral icterus.   Neck: Normal range of motion. Neck supple. No JVD  present. No tracheal deviation present. No thyromegaly present.   Cardiovascular: Normal rate, regular rhythm, normal heart sounds and intact distal pulses.  Exam reveals no gallop and no friction rub.    No murmur heard.  Pulmonary/Chest: Effort normal and breath sounds normal. No respiratory distress. She has no wheezes.   Abdominal: Soft. Bowel sounds are normal. She exhibits no distension and no mass. There is no tenderness. There is no rebound and no guarding.   Genitourinary: Vagina normal and uterus normal. No vaginal discharge found.   Genitourinary Comments: No tenderness at the urethra.  Positive tenderness at the kerrie-urethral area on the both sides.  Positive tenderness on palpation each side of vaginal wall.  No tenderness on palpation at the base of the bladder.    Difficulty in localizing the levator ani on YEE of rectum.  Slight tenderness noted on the levator ani.   Musculoskeletal: Normal range of motion. She exhibits no edema, tenderness or deformity.   Lymphadenopathy:     She has no cervical adenopathy.   Neurological: She is alert and oriented to person, place, and time.   Skin: Skin is warm and dry. She is not diaphoretic.     Psychiatric: She has a normal mood and affect. Her behavior is normal. Thought content normal.         LABS:  Lab Results   Component Value Date    CREATININE 0.8 05/07/2019     Urine Culture, Routine   Date Value Ref Range Status   05/07/2019 No growth  Final     UA clear  PVR per cath 120 ml    Assessment and Plan:  Chelsea was seen today for post-op evaluation.    Diagnoses and all orders for this visit:    OAB (overactive bladder)    Incomplete bladder emptying    Pelvic pain in female  -     diazePAM (VALIUM) 5 MG tablet; Take 1 tablet (5 mg total) by mouth 3 (three) times daily as needed (pelvic floor dysfunction).  -     gabapentin (NEURONTIN) 300 MG capsule; Take 1 capsule (300 mg total) by mouth 3 (three) times daily.    Bladder pain    Pelvic pain  -      amitriptyline (ELAVIL) 25 MG tablet; Take 1 tablet (25 mg total) by mouth every evening. 1 to 2 tablets at bed time to help with bladder pain and sleeping    I explained that InterStim Therapy for Urinary Control is indicated for the treatment of urinary retention and the symptoms of overactive bladder, including urinary urge incontinence and significant symptoms of urgency-frequency alone or in combination, in patients who have failed or could not tolerate more conservative treatments.    Options of sacral nerve test stimulation by PNE (percutaneous lead) vs. Stage I InterStim Therapy ( implanted electrode) explained. Once the test stimulation results in a successful response, either a complete InterStim Therapy ( both stage I and II) or Stage II InterStim Therapy ( implantation of InterStim neuro-generator) will be scheduled.    Will schedule stage I for sacral nerve test stimulation to see whether or not it will improve this patient's refractory urinary symptoms.    Nature and risks of the procedure including, but not limited to pain, bleeding, infection, failure to improve urinary symptoms, lead migration, electrical shock, nerve injury, or mechanical failure explained. All questions were answered.    Patient understands and agrees to undergo the proposed procedure. InterStim brochure given.  All questions answered.    Continue flomax, elavil, valium prn.  She will do a voiding diary to establish her baseline.    She experienced significant improvement of her bladder pain after bladder instillation ( from 6/10 to 2/10 on visual scale, except pain at the urethra following the catheterization)    I spent 40 minutes with the patient of which more than half was spent in direct consultation with the patient in regards to our treatment and plan.    Follow-up:  Follow up for stage I Instim Therapy.

## 2019-07-16 NOTE — LETTER
July 16, 2019      Tennille Amaro MD  [address] [place] [address]           [facility] - Urology 4th Floor  [address]  [place] [address]  Phone: [phone]          Patient: Chelsea Oconnell   MR Number: [id number]   YOB: 1980   Date of Visit: 7/16/2019       Dear Dr. Tennille Amaro:    Thank you for referring Chelsea Oconnell to me for evaluation. Attached you will find relevant portions of my assessment and plan of care.    If you have questions, please do not hesitate to call me. I look forward to following Chelsea Oconnell along with you.    Sincerely,    Donny Calle MD    Enclosure  CC:  No Recipients    If you would like to receive this communication electronically, please contact [email or web address] or [phone] to request more information on [employer] access.    For providers and/or their staff who would like to refer a patient to [facility], please contact us through our one-stop-shop provider referral line, [facility], at [phone].    If you feel you have received this communication in error or would no longer like to receive these types of communications, please e-mail [email or web address]

## 2019-07-16 NOTE — PATIENT INSTRUCTIONS
I explained that InterStim Therapy for Urinary Control is indicated for the treatment of urinary retention and the symptoms of overactive bladder, including urinary urge incontinence and significant symptoms of urgency-frequency alone or in combination, in patients who have failed or could not tolerate more conservative treatments.    Options of sacral nerve test stimulation by PNE (percutaneous lead) vs. Stage I InterStim Therapy ( implanted electrode) explained. Once the test stimulation results in a successful response, either a complete InterStim Therapy ( both stage I and II) or Stage II InterStim Therapy ( implantation of InterStim neuro-generator) will be scheduled.    Will schedule stage I for sacral nerve test stimulation to see whether or not it will improve this patient's refractory urinary symptoms.    Nature and risks of the procedure including, but not limited to pain, bleeding, infection, failure to improve urinary symptoms, lead migration, electrical shock, nerve injury, or mechanical failure explained. All questions were answered.    Patient understands and agrees to undergo the proposed procedure.

## 2019-07-18 DIAGNOSIS — R10.2 PELVIC PAIN: ICD-10-CM

## 2019-07-18 RX ORDER — AMITRIPTYLINE HYDROCHLORIDE 25 MG/1
25 TABLET, FILM COATED ORAL NIGHTLY PRN
Qty: 30 TABLET | Refills: 11 | Status: SHIPPED | OUTPATIENT
Start: 2019-07-18 | End: 2020-06-02 | Stop reason: SDUPTHER

## 2019-07-24 ENCOUNTER — ANESTHESIA EVENT (OUTPATIENT)
Dept: SURGERY | Facility: HOSPITAL | Age: 39
End: 2019-07-24
Payer: COMMERCIAL

## 2019-07-24 NOTE — ANESTHESIA PREPROCEDURE EVALUATION
[facility]  Anesthesia Pre-Operative Evaluation         Patient Name: Chelsea Oconnell  YOB: 1980  MRN: [id number]    SUBJECTIVE:     Pre-operative evaluation for Procedure(s) (LRB):  INSERTION, NEUROSTIMULATOR, TEMPORARY, SACRAL FLUOR (N/A)     07/30/2019    Chelsea Oconnell is a 38 y.o. female w/ a significant PMHx of incomplete bladder emptying.    Patient now presents for the above procedure(s).      LDA: None documented.       Prev airway: None documented.    Drips: None documented.      Patient Active Problem List   Diagnosis    IC (interstitial cystitis)    Bladder pain    OAB (overactive bladder)    Interstitial cystitis    Incomplete bladder emptying    Pelvic pain in female       Review of patient's allergies indicates:   Allergen Reactions    Iodine and iodide containing products Rash and Swelling    Shellfish containing products Rash and Swelling    Sulfa (sulfonamide antibiotics) Rash and Swelling    Xanax [alprazolam] Hives       Current Inpatient Medications:      No current facility-administered medications on file prior to encounter.      Current Outpatient Medications on File Prior to Encounter   Medication Sig Dispense Refill    calcium glycerophosphate (PRELIEF ORAL) Take 2 mg by mouth once daily.      cyanocobalamin (VITAMIN B-12) 1000 MCG tablet Take 100 mcg by mouth every 12 (twelve) hours.      diazePAM (VALIUM) 5 MG tablet Take 1 tablet (5 mg total) by mouth 3 (three) times daily as needed (pelvic floor dysfunction). 60 tablet 0    estradiol (ESTRACE) 2 MG tablet       gabapentin (NEURONTIN) 300 MG capsule Take 1 capsule (300 mg total) by mouth 3 (three) times daily. 90 capsule 11    ibuprofen (ADVIL,MOTRIN) 200 MG tablet Take 200 mg by mouth every 6 (six) hours as needed for Pain.      Lactobacillus rhamnosus GG (CULTURELLE) 10 billion cell capsule Take 1 capsule by mouth once daily.      LORazepam (ATIVAN) 1 MG tablet Take 1 tablet (1 mg  total) by mouth every 6 (six) hours as needed for Anxiety. 30 tablet 0    methen-m.blue-s.phos-phsal-hyo (URIBEL) 118-10-40.8-36 mg Cap Take 1 capsule by mouth once daily. 60 capsule 0    omega-3 acid ethyl esters (LOVAZA) 1 gram capsule Take 1 g by mouth once daily.      tamsulosin (FLOMAX) 0.4 mg Cap Take 1 capsule (0.4 mg total) by mouth every evening. 30 capsule 11    venlafaxine (EFFEXOR-XR) 75 MG 24 hr capsule          Past Surgical History:   Procedure Laterality Date    CYSTOSCOPY, WITH BLADDER HYDRODISTENSION AND BIOPSY N/A 6/5/2019    Performed by Donny Calle MD at [facility] OR [address] [place] [address]    DILATION, URETHRA N/A 6/5/2019    [facility]    HYSTERECTOMY      INSTILLATION, BLADDER N/A 6/5/2019    Performed by Donny Calle MD at [facility] OR [address] [place] [address]    liver      hemangioma removed    URODYNAMIC STUDY, FLUOROSCOPIC N/A 6/5/2019    Performed by Donny Calle MD at [facility] OR [address] [place] [address]       Social History     Socioeconomic History    Marital status:      Spouse name: Not on file    Number of children: Not on file    Years of education: Not on file    Highest education level: Not on file   Occupational History    Not on file   Social Needs    Financial resource strain: Not on file    Food insecurity:     Worry: Not on file     Inability: Not on file    Transportation needs:     Medical: Not on file     Non-medical: Not on file   Tobacco Use    Smoking status: Never Smoker    Smokeless tobacco: Never Used   Substance and Sexual Activity    Alcohol use: Never     Frequency: Never    Drug use: Never    Sexual activity: Not on file   Lifestyle    Physical activity:     Days per week: Not on file     Minutes per session: Not on file    Stress: Not on file   Relationships    Social connections:     Talks on phone: Not on file     Gets together: Not on file     Attends [employer] service: Not on file     Active member of club or organization: Not on file      Attends meetings of clubs or organizations: Not on file     Relationship status: Not on file   Other Topics Concern    Not on file   Social History Narrative    Not on file       OBJECTIVE:     Vital Signs Range (Last 24H):         Significant Labs:  Lab Results   Component Value Date    WBC 5.86 05/07/2019    HGB 14.1 05/07/2019    HCT 42.9 05/07/2019     05/07/2019    ALT 15 05/07/2019    AST 18 05/07/2019     05/07/2019    K 4.1 05/07/2019     05/07/2019    CREATININE 0.8 05/07/2019    BUN 9 05/07/2019    CO2 31 (H) 05/07/2019       Diagnostic Studies: No relevant studies.    EKG: No results found for this or any previous visit.      2D ECHO:  No results found for this or any previous visit.      ASSESSMENT/PLAN:         Nisha Monson, RN   Registered Nurse      Pre Admission Screening   Signed                     Anesthesia Assessment: Preoperative EQUATION     Planned Procedure: Procedure(s) (LRB):  INSERTION, NEUROSTIMULATOR, TEMPORARY, SACRAL FLUOR (N/A)  Requested Anesthesia Type:Monitor Anesthesia Care  Surgeon: Donny Calle MD  Service: Urology  Known or anticipated Date of Surgery:7/31/2019     Surgeon notes: reviewed     Electronic QUestionnaire Assessment completed via nurse interview with patient.      NO AQ     Triage considerations:      The patient has no apparent active cardiac condition (No unstable coronary Syndrome such as severe unstable angina or recent [<1 month] myocardial infarction, decompensated CHF, severe valvular   disease or significant arrhythmia)     Previous anesthesia records:GETA and No problems   6/5/2019 Cysto with bladder hydro distention  Airway/Jaw/Neck:  Airway Findings: Mouth Opening: Normal Tongue: Normal  General Airway Assessment: Adult, Average  Mallampati: II  TM Distance: Normal, at least 6 cm Jaw/Neck Findings:  Neck ROM: Normal ROM      Last PCP note: 3-6 months ago , outside [facility]  Subspecialty notes: n/a     Other important  co-morbidities: Lupus, OAB, [place]     Tests already available:  Available tests,  within 3 months , within [facility] . 5/7/2019 CBC, CMP                            Instructions given. (See in Nurse's note)     Optimization:  Anesthesia Preop Clinic Assessment  Indicated: Not required for this procedure                Plan:    Testing:  NONE                           Patient  has previously scheduled Medical Appointment:  Not at this time prior to surgery        Navigation:    Straight Line to surgery.                            No tests, anesthesia preop clinic visit, or consult required.                                                                                                                       07/24/2019  Chelsea Oconnell is a 38 y.o., female.    Anesthesia Evaluation    I have reviewed the Patient Summary Reports.    I have reviewed the Nursing Notes.   I have reviewed the Medications.     Review of Systems  Anesthesia Hx:  No problems with previous Anesthesia WANTS TO KEEP IN HEAERING AIDS, RED ON RIGHT, BLUE ON LEFT.  I SAID WE WOULD TAKE THEM OUT WHEN WE SEDATE HER DEEPLY History of prior surgery of interest to airway management or planning: Previous anesthesia: General 6/5/2019 Cysto with bladder hydro distention with general anesthesia.  Procedure performed at an [facility] Facility. Denies Family Hx of Anesthesia complications.   Denies Personal Hx of Anesthesia complications.   Social:  No Alcohol Use, Non-Smoker    Hematology/Oncology:     Oncology Normal   Hematology Comments: luupus well controlled right now   EENT/Dental:   Ears General/Symptom(s) Hearing Impairment: hearing-aid left, hearing-aid right    Cardiovascular:   Denies Hypertension.  Denies MI.    Denies Angina. Walks a mile on occasion. Functional Capacity good / => 4 METS    Pulmonary:  Pulmonary Normal  Denies COPD.  Denies Asthma.  Denies Shortness of breath.  Denies Recent URI.    Renal/:  Renal Symptoms/Infections/Stones: frequency,  urgency.  Other Renal / Gu Conditions: (possible pelvic floor dysfunction) Interstitial Cystitis  Hepatic/GI:   Liver Disease, (had liver resection for hemangioma )  Liver Disease S/P resection of liver hemangioma   Musculoskeletal:  Rheumatic Disease, Lupus    Neurological:  Neurology Normal Denies TIA.  Denies CVA. Denies Seizures.    Endocrine:  Endocrine Normal Denies Diabetes.    Psych:   depression          Physical Exam  General:  Well nourished    Airway/Jaw/Neck:  Airway Findings: Mouth Opening: Normal Tongue: Normal  General Airway Assessment: Adult  Mallampati: II  TM Distance: Normal, at least 6 cm  Jaw/Neck Findings:  Neck ROM: Normal ROM     Eyes/Ears/Nose:  EYES/EARS/NOSE FINDINGS: Normal   Dental:  Dental Findings: In tact   Chest/Lungs:  Chest/Lungs Findings: Clear to auscultation, Normal Respiratory Rate     Heart/Vascular:  Heart Findings: Rate: Normal  Rhythm: Regular Rhythm  Sounds: Normal     Abdomen:  Abdomen Findings: Normal    Musculoskeletal:  Musculoskeletal Findings: Normal   Skin:  Skin Findings: Normal    Mental Status:  Mental Status Findings:  Cooperative, Alert and Oriented         Anesthesia Plan  Type of Anesthesia, risks & benefits discussed:  Anesthesia Type:  general, MAC  Patient's Preference:   Intra-op Monitoring Plan: standard ASA monitors  Intra-op Monitoring Plan Comments:   Post Op Pain Control Plan: multimodal analgesia, IV/PO Opioids PRN and per primary service following discharge from PACU  Post Op Pain Control Plan Comments:   Induction:   IV  Beta Blocker:  Patient is not currently on a Beta-Blocker (No further documentation required).       Informed Consent: Patient understands risks and agrees with Anesthesia plan.  Questions answered. Anesthesia consent signed with patient.  ASA Score: 1     Day of Surgery Review of History & Physical:    H&P update referred to the surgeon.         Ready For Surgery From Anesthesia Perspective.

## 2019-07-24 NOTE — PRE ADMISSION SCREENING
Anesthesia Assessment: Preoperative EQUATION    Planned Procedure: Procedure(s) (LRB):  INSERTION, NEUROSTIMULATOR, TEMPORARY, SACRAL FLUOR (N/A)  Requested Anesthesia Type:Monitor Anesthesia Care  Surgeon: Donny Calle MD  Service: Urology  Known or anticipated Date of Surgery:7/31/2019    Surgeon notes: reviewed    Electronic QUestionnaire Assessment completed via nurse interview with patient.      NO AQ    Triage considerations:     The patient has no apparent active cardiac condition (No unstable coronary Syndrome such as severe unstable angina or recent [<1 month] myocardial infarction, decompensated CHF, severe valvular   disease or significant arrhythmia)    Previous anesthesia records:GETA and No problems   6/5/2019 Cysto with bladder hydro distention  Airway/Jaw/Neck:  Airway Findings: Mouth Opening: Normal Tongue: Normal  General Airway Assessment: Adult, Average  Mallampati: II  TM Distance: Normal, at least 6 cm Jaw/Neck Findings:  Neck ROM: Normal ROM     Last PCP note: 3-6 months ago , outside [facility]  Subspecialty notes: n/a    Other important co-morbidities: Lupus, OAB, [place]     Tests already available:  Available tests,  within 3 months , within [facility] . 5/7/2019 CBC, CMP            Instructions given. (See in Nurse's note)    Optimization:  Anesthesia Preop Clinic Assessment  Indicated: Not required for this procedure       Plan:    Testing:  NONE     Patient  has previously scheduled Medical Appointment:  Not at this time prior to surgery      Navigation:  Straight Line to surgery.                No tests, anesthesia preop clinic visit, or consult required.

## 2019-07-30 ENCOUNTER — TELEPHONE (OUTPATIENT)
Dept: UROLOGY | Facility: CLINIC | Age: 39
End: 2019-07-30

## 2019-07-30 NOTE — TELEPHONE ENCOUNTER
Called pt to confirm 6am arrival time for procedure. Gave pt NPO instructions and gave pt oportunity to ask questions. Pt verbalized understanding.

## 2019-07-31 ENCOUNTER — HOSPITAL ENCOUNTER (OUTPATIENT)
Facility: HOSPITAL | Age: 39
Discharge: HOME | End: 2019-07-31
Attending: UROLOGY | Admitting: UROLOGY
Payer: COMMERCIAL

## 2019-07-31 ENCOUNTER — ANESTHESIA (OUTPATIENT)
Dept: SURGERY | Facility: HOSPITAL | Age: 39
End: 2019-07-31
Payer: COMMERCIAL

## 2019-07-31 VITALS
SYSTOLIC BLOOD PRESSURE: 113 MMHG | OXYGEN SATURATION: 97 % | HEIGHT: 62 IN | HEART RATE: 66 BPM | TEMPERATURE: 98 F | BODY MASS INDEX: 26.87 KG/M2 | DIASTOLIC BLOOD PRESSURE: 70 MMHG | WEIGHT: 146 LBS | RESPIRATION RATE: 16 BRPM

## 2019-07-31 DIAGNOSIS — R33.9 INCOMPLETE EMPTYING OF BLADDER: ICD-10-CM

## 2019-07-31 DIAGNOSIS — N30.10 IC (INTERSTITIAL CYSTITIS): Primary | ICD-10-CM

## 2019-07-31 PROCEDURE — 27201423 OPTIME MED/SURG SUP & DEVICES STERILE SUPPLY: Performed by: UROLOGY

## 2019-07-31 PROCEDURE — 76000 PR  FLUOROSCOPE EXAMINATION: ICD-10-PCS | Mod: 26,,, | Performed by: UROLOGY

## 2019-07-31 PROCEDURE — 36000708 HC OR TIME LEV III 1ST 15 MIN: Performed by: UROLOGY

## 2019-07-31 PROCEDURE — 64581 OPN IMPLTJ NEA SACRAL NERVE: CPT | Mod: ,,, | Performed by: UROLOGY

## 2019-07-31 PROCEDURE — 37000009 HC ANESTHESIA EA ADD 15 MINS: Performed by: UROLOGY

## 2019-07-31 PROCEDURE — 71000015 HC POSTOP RECOV 1ST HR: Performed by: UROLOGY

## 2019-07-31 PROCEDURE — C1894 INTRO/SHEATH, NON-LASER: HCPCS | Performed by: UROLOGY

## 2019-07-31 PROCEDURE — D9220A PRA ANESTHESIA: ICD-10-PCS | Mod: ,,, | Performed by: ANESTHESIOLOGY

## 2019-07-31 PROCEDURE — C1778 LEAD, NEUROSTIMULATOR: HCPCS | Performed by: UROLOGY

## 2019-07-31 PROCEDURE — 63600175 PHARM REV CODE 636 W HCPCS: Performed by: STUDENT IN AN ORGANIZED HEALTH CARE EDUCATION/TRAINING PROGRAM

## 2019-07-31 PROCEDURE — 71000044 HC DOSC ROUTINE RECOVERY FIRST HOUR: Performed by: UROLOGY

## 2019-07-31 PROCEDURE — 36000709 HC OR TIME LEV III EA ADD 15 MIN: Performed by: UROLOGY

## 2019-07-31 PROCEDURE — 71000016 HC POSTOP RECOV ADDL HR: Performed by: UROLOGY

## 2019-07-31 PROCEDURE — 64581 PR IMPLANTATION, NEUROSTIM ELECT ARRAY, OPEN, SACRAL NERVE: ICD-10-PCS | Mod: ,,, | Performed by: UROLOGY

## 2019-07-31 PROCEDURE — 25000003 PHARM REV CODE 250: Performed by: UROLOGY

## 2019-07-31 PROCEDURE — 63600175 PHARM REV CODE 636 W HCPCS: Performed by: ANESTHESIOLOGY

## 2019-07-31 PROCEDURE — 37000008 HC ANESTHESIA 1ST 15 MINUTES: Performed by: UROLOGY

## 2019-07-31 PROCEDURE — D9220A PRA ANESTHESIA: Mod: ,,, | Performed by: ANESTHESIOLOGY

## 2019-07-31 PROCEDURE — 76000 FLUOROSCOPY <1 HR PHYS/QHP: CPT | Mod: 26,,, | Performed by: UROLOGY

## 2019-07-31 PROCEDURE — C1767 GENERATOR, NEURO NON-RECHARG: HCPCS | Performed by: UROLOGY

## 2019-07-31 DEVICE — LEAD KIT TINED: Type: IMPLANTABLE DEVICE | Site: BACK | Status: FUNCTIONAL

## 2019-07-31 RX ORDER — HYDROCODONE BITARTRATE AND ACETAMINOPHEN 5; 325 MG/1; MG/1
1 TABLET ORAL EVERY 6 HOURS PRN
Qty: 11 TABLET | Refills: 0 | Status: SHIPPED | OUTPATIENT
Start: 2019-07-31 | End: 2019-08-10

## 2019-07-31 RX ORDER — FENTANYL CITRATE 50 UG/ML
25 INJECTION, SOLUTION INTRAMUSCULAR; INTRAVENOUS EVERY 5 MIN PRN
Status: DISCONTINUED | OUTPATIENT
Start: 2019-07-31 | End: 2019-07-31 | Stop reason: HOSPADM

## 2019-07-31 RX ORDER — ACETAMINOPHEN 10 MG/ML
INJECTION, SOLUTION INTRAVENOUS
Status: DISCONTINUED | OUTPATIENT
Start: 2019-07-31 | End: 2019-07-31

## 2019-07-31 RX ORDER — ONDANSETRON 2 MG/ML
INJECTION INTRAMUSCULAR; INTRAVENOUS
Status: DISCONTINUED | OUTPATIENT
Start: 2019-07-31 | End: 2019-07-31

## 2019-07-31 RX ORDER — LIDOCAINE HYDROCHLORIDE AND EPINEPHRINE 10; 10 MG/ML; UG/ML
INJECTION, SOLUTION INFILTRATION; PERINEURAL
Status: DISCONTINUED | OUTPATIENT
Start: 2019-07-31 | End: 2019-07-31 | Stop reason: HOSPADM

## 2019-07-31 RX ORDER — VANCOMYCIN HCL IN 5 % DEXTROSE 1G/250ML
1000 PLASTIC BAG, INJECTION (ML) INTRAVENOUS
Status: COMPLETED | OUTPATIENT
Start: 2019-07-31 | End: 2019-07-31

## 2019-07-31 RX ORDER — MEPERIDINE HYDROCHLORIDE 50 MG/ML
12.5 INJECTION INTRAMUSCULAR; INTRAVENOUS; SUBCUTANEOUS ONCE AS NEEDED
Status: DISCONTINUED | OUTPATIENT
Start: 2019-07-31 | End: 2019-07-31 | Stop reason: HOSPADM

## 2019-07-31 RX ORDER — SODIUM CHLORIDE 9 MG/ML
INJECTION, SOLUTION INTRAVENOUS CONTINUOUS
Status: DISCONTINUED | OUTPATIENT
Start: 2019-07-31 | End: 2019-07-31 | Stop reason: HOSPADM

## 2019-07-31 RX ORDER — SODIUM BICARBONATE 1 MEQ/ML
SYRINGE (ML) INTRAVENOUS
Status: DISCONTINUED | OUTPATIENT
Start: 2019-07-31 | End: 2019-07-31 | Stop reason: HOSPADM

## 2019-07-31 RX ORDER — HYDROMORPHONE HYDROCHLORIDE 1 MG/ML
0.2 INJECTION, SOLUTION INTRAMUSCULAR; INTRAVENOUS; SUBCUTANEOUS EVERY 5 MIN PRN
Status: DISCONTINUED | OUTPATIENT
Start: 2019-07-31 | End: 2019-07-31 | Stop reason: HOSPADM

## 2019-07-31 RX ORDER — MIDAZOLAM HYDROCHLORIDE 1 MG/ML
INJECTION, SOLUTION INTRAMUSCULAR; INTRAVENOUS
Status: DISCONTINUED | OUTPATIENT
Start: 2019-07-31 | End: 2019-07-31

## 2019-07-31 RX ORDER — PROPOFOL 10 MG/ML
INJECTION, EMULSION INTRAVENOUS
Status: DISCONTINUED | OUTPATIENT
Start: 2019-07-31 | End: 2019-07-31

## 2019-07-31 RX ORDER — DEXAMETHASONE SODIUM PHOSPHATE 4 MG/ML
INJECTION, SOLUTION INTRA-ARTICULAR; INTRALESIONAL; INTRAMUSCULAR; INTRAVENOUS; SOFT TISSUE
Status: DISCONTINUED | OUTPATIENT
Start: 2019-07-31 | End: 2019-07-31

## 2019-07-31 RX ORDER — FENTANYL CITRATE 50 UG/ML
INJECTION, SOLUTION INTRAMUSCULAR; INTRAVENOUS
Status: DISCONTINUED | OUTPATIENT
Start: 2019-07-31 | End: 2019-07-31

## 2019-07-31 RX ORDER — CEPHALEXIN 500 MG/1
500 CAPSULE ORAL EVERY 8 HOURS
Qty: 15 CAPSULE | Refills: 0 | Status: SHIPPED | OUTPATIENT
Start: 2019-07-31 | End: 2019-08-05

## 2019-07-31 RX ORDER — ONDANSETRON 2 MG/ML
4 INJECTION INTRAMUSCULAR; INTRAVENOUS DAILY PRN
Status: DISCONTINUED | OUTPATIENT
Start: 2019-07-31 | End: 2019-07-31 | Stop reason: HOSPADM

## 2019-07-31 RX ORDER — DIPHENHYDRAMINE HYDROCHLORIDE 50 MG/ML
INJECTION INTRAMUSCULAR; INTRAVENOUS
Status: DISCONTINUED | OUTPATIENT
Start: 2019-07-31 | End: 2019-07-31

## 2019-07-31 RX ORDER — PROPOFOL 10 MG/ML
INJECTION, EMULSION INTRAVENOUS CONTINUOUS PRN
Status: DISCONTINUED | OUTPATIENT
Start: 2019-07-31 | End: 2019-07-31

## 2019-07-31 RX ORDER — SODIUM CHLORIDE 0.9 % (FLUSH) 0.9 %
10 SYRINGE (ML) INJECTION
Status: DISCONTINUED | OUTPATIENT
Start: 2019-07-31 | End: 2019-07-31 | Stop reason: HOSPADM

## 2019-07-31 RX ADMIN — FENTANYL CITRATE 50 MCG: 50 INJECTION, SOLUTION INTRAMUSCULAR; INTRAVENOUS at 09:07

## 2019-07-31 RX ADMIN — FENTANYL CITRATE 100 MCG: 50 INJECTION, SOLUTION INTRAMUSCULAR; INTRAVENOUS at 08:07

## 2019-07-31 RX ADMIN — DEXAMETHASONE SODIUM PHOSPHATE 8 MG: 4 INJECTION, SOLUTION INTRAMUSCULAR; INTRAVENOUS at 08:07

## 2019-07-31 RX ADMIN — MIDAZOLAM HYDROCHLORIDE 0.5 MG: 1 INJECTION, SOLUTION INTRAMUSCULAR; INTRAVENOUS at 08:07

## 2019-07-31 RX ADMIN — ACETAMINOPHEN 1000 MG: 10 INJECTION, SOLUTION INTRAVENOUS at 09:07

## 2019-07-31 RX ADMIN — PROPOFOL 25 MCG/KG/MIN: 10 INJECTION, EMULSION INTRAVENOUS at 08:07

## 2019-07-31 RX ADMIN — ONDANSETRON 4 MG: 2 INJECTION INTRAMUSCULAR; INTRAVENOUS at 09:07

## 2019-07-31 RX ADMIN — HYDROMORPHONE HYDROCHLORIDE 0.2 MG: 1 INJECTION, SOLUTION INTRAMUSCULAR; INTRAVENOUS; SUBCUTANEOUS at 10:07

## 2019-07-31 RX ADMIN — GENTAMICIN SULFATE 240 MG: 40 INJECTION, SOLUTION INTRAMUSCULAR; INTRAVENOUS at 08:07

## 2019-07-31 RX ADMIN — DIPHENHYDRAMINE HYDROCHLORIDE 25 MG: 50 INJECTION, SOLUTION INTRAMUSCULAR; INTRAVENOUS at 08:07

## 2019-07-31 RX ADMIN — MIDAZOLAM HYDROCHLORIDE 1 MG: 1 INJECTION, SOLUTION INTRAMUSCULAR; INTRAVENOUS at 09:07

## 2019-07-31 RX ADMIN — PROPOFOL 10 MG: 10 INJECTION, EMULSION INTRAVENOUS at 09:07

## 2019-07-31 RX ADMIN — PROPOFOL 10 MG: 10 INJECTION, EMULSION INTRAVENOUS at 08:07

## 2019-07-31 RX ADMIN — VANCOMYCIN HYDROCHLORIDE 1000 MG: 1 INJECTION, POWDER, LYOPHILIZED, FOR SOLUTION INTRAVENOUS at 08:07

## 2019-07-31 RX ADMIN — VANCOMYCIN HYDROCHLORIDE 1000 MG: 1 INJECTION, POWDER, LYOPHILIZED, FOR SOLUTION INTRAVENOUS at 07:07

## 2019-07-31 NOTE — ANESTHESIA POSTPROCEDURE EVALUATION
Anesthesia Post Evaluation    Patient: Chelsea Oconnell    Procedure(s) Performed: Procedure(s) (LRB):  INSERTION, ELECTRODE LEAD, NEUROSTIMULATOR, SACRAL (Right)  FLUOROSCOPY (N/A)    Final Anesthesia Type: MAC  Patient location during evaluation: PACU  Patient participation: Yes- Able to Participate  Level of consciousness: awake and alert  Post-procedure vital signs: reviewed and stable  Pain management: adequate  Airway patency: patent  PONV status at discharge: No PONV  Anesthetic complications: no      Cardiovascular status: blood pressure returned to baseline  Respiratory status: spontaneous ventilation and room air  Hydration status: euvolemic  Follow-up not needed.          Vitals Value Taken Time   /70 7/31/2019 11:10 AM   Temp 36.7 °C (98.1 °F) 7/31/2019 11:08 AM   Pulse 66 7/31/2019 11:08 AM   Resp 16 7/31/2019  9:35 AM   SpO2 97 % 7/31/2019 11:08 AM   Vitals shown include unvalidated device data.      No case tracking events are documented in the log.      Pain/Tatum Score: Pain Rating Prior to Med Admin: 4 (7/31/2019 10:35 AM)  Tatum Score: 10 (7/31/2019 10:35 AM)

## 2019-07-31 NOTE — INTERVAL H&P NOTE
The patient has been examined and the H&P has been reviewed:    I concur with the findings and no changes have occurred since H&P was written.     No recent f/c/n/v. Sleeps on her right side.     Anesthesia/Surgery risks, benefits and alternative options discussed and understood by patient/family.          Active Hospital Problems    Diagnosis  POA    Incomplete emptying of bladder [R33.9]  Yes      Resolved Hospital Problems   No resolved problems to display.

## 2019-07-31 NOTE — OP NOTE
[facility]  Operative Note    Date: 07/31/2019    Pre-Op Diagnosis: interstitial cystitis, pelvic pain, urinary frequency, urgency, incomplete emptying    Patient Active Problem List   Diagnosis    IC (interstitial cystitis)    Bladder pain    OAB (overactive bladder)    Interstitial cystitis    Incomplete bladder emptying    Pelvic pain in female    Incomplete emptying of bladder       Post-Op Diagnosis: same    Procedure(s) Performed:   1.  Incision for implantation of sacral nerve neurostimulator electrodes (transforaminal placement) (46560)  2.  Fluoroscopic guidance of needle (03584-79)    Specimen(s): none    Staff Surgeon: Donny Calle MD    Assistant Surgeon: Aubrey Clark MD    Anesthesia: Monitored Local Anesthesia with Sedation    Indications: Chelsea Oconnell is a 38 y.o. female with interstitial cystitis, pelvic pain and bothersome urinary urgency and frequency who has tried and failed conservative therapies. She presents today for interstim stage I.     Findings:   Lead placed on right side  Pocket placed on left side.   Good sensory and motor function consistent with S3 stimulation seen, leads 1 and 2 strongest.     Estimated Blood Loss: minimal    Drains: none    Complications:  None    Procedure in Detail:  After informed consent was obtained, the patient was transferred to the operating room and placed in the prone position.  Pillows were placed under lower abdomen to flatten sacrum and under shins to allow the toes to dangle freely. Anesthesia was administered.  The patient was prepped and draped in the usual sterile fashion and preoperative antibiotics were confirmed. Timeout was performed.    The C-arm was draped and moved into AP position to provide fluoroscopic mapping of the sacral region. A pen was allowed to roll in the midline of the sacrum until it balanced horizontally. The fulcrum point was marked as the level of S3. A point 2 cm lateral and 2 cm superior to the  marked S3 level was marked as the entry point for the foramen needle.  1% lidocaine with epinephrine was injected into this area where the finder needles were to be placed. A foramen needle was introduced and repositioned as needed until the S3 foramen was identified and penetrated. The depth of the needle was confirmed and adjusted fluoroscopically. Proper needle position was confirmed by patient identification of location of sensation, direct observation of the lifting of the perineum and observation of plantar flexion of the great toe utilizing the external test stimulator. The foramen needle stylet was removed and a directional guide was placed and confirmed fluoroscopically. The needle was removed keeping the directional guide in place. An incision was made peripherally to the directional guide through the fascial layer. The lead introducer sheath with dilator was placed over the directional guide and directed into the foramen ensuring the radiopaque marker did not extend beyond the anterior edge of the sacrum. The dilator was unlocked and removed along with the directional guide keeping the introducer sheath in place.     The lead was then placed through the introducer sheath to the first white line.  Position was checked fluoroscopically. The lead was then further introduced until 2 electrodes were visible below the sacrum.  Each electrode was tested for location of patient sensation, visualization of tae, and plantar flexion of the great toe. After satisfactory positioning was confirmed, the introducer sheath was retracted under continuous fluoro, deploying lead tines into the perisacral tissue.    A 4 cm incision was made sharply using a 15 blade into subcutaneous tissue posterior to the iliac crest and lateral to the sacrum. Bovie electrocautery and blunt dissection was used until the gluteal fascia was identified. Hemostasis was excellent. This created the pocket.    A tunneling trocar with sheath was  placed from the lead exit site subcutaneously to the incised pocket site. The trocar was removed and the lead was fed through the sheath and pulled out at the pocket site. The lead was cleansed of bodily fluids, dried and a protective boot was placed over the lead. The lead was inserted into the temporary percutaneous extension and the metal bands were aligned. The 4 setscrews were tightened with the hex wrench. The boot was pushed over the connection and a 2-0 silk tie secured the connection at the boot grooves on either side.      Another tunnel was made subcutaneously using the tunneling trocar to a puncture site above the contralateral buttock. The percutaneous extension was placed through the sheath and exposed and connected to the twist lock gray cable. The wounds were then closed in a 2 layers fashion using 3-0 vicryl deep dermal sutures and a running 4-0 monocryl subcuticular suture.  Dermabond was applied.     The patient tolerated the procedure well and was transferred to the recovery room in stable condition.      Disposition:  The patient will follow up with Dr. Calle in 2 weeks.  Prescriptions were given for keflex, norco.  The patient will meet with the [employer] rep in the recovery room.      Aubrey Clark MD

## 2019-07-31 NOTE — DISCHARGE SUMMARY
[facility]  Discharge Note  Short Stay    Admit Date: 7/31/2019    Discharge Date and Time: 07/31/2019 9:36 AM      Attending Physician: Donny Calle MD     Discharge Provider: Aubrey Clark    Diagnoses:  Active Hospital Problems    Diagnosis  POA    *IC (interstitial cystitis) [N30.10]  Yes    Incomplete emptying of bladder [R33.9]  Yes    Incomplete bladder emptying [R33.9]  Yes    Pelvic pain in female [R10.2]  Yes    Bladder pain [R39.89]  Yes    OAB (overactive bladder) [N32.81]  Yes      Resolved Hospital Problems   No resolved problems to display.       Discharged Condition: good    Hospital Course: Patient was admitted for interstim stage I and tolerated the procedure well with no complications. The patient was discharged home in good condition on the same day.       Final Diagnoses: Same as principal problem.    Disposition: Home or Self Care    Follow up/Patient Instructions:    Medications:  Reconciled Home Medications:   Current Discharge Medication List      START taking these medications    Details   cephALEXin (KEFLEX) 500 MG capsule Take 1 capsule (500 mg total) by mouth every 8 (eight) hours. for 5 days  Qty: 15 capsule, Refills: 0      HYDROcodone-acetaminophen (NORCO) 5-325 mg per tablet Take 1 tablet by mouth every 6 (six) hours as needed for Pain.  Qty: 11 tablet, Refills: 0         CONTINUE these medications which have NOT CHANGED    Details   amitriptyline (ELAVIL) 25 MG tablet Take 1 tablet (25 mg total) by mouth nightly as needed for Insomnia. One by mouth every evening at bedtime to help with bladder pain and sleeping  Qty: 30 tablet, Refills: 11      calcium glycerophosphate (PRELIEF ORAL) Take 2 mg by mouth once daily.      cyanocobalamin (VITAMIN B-12) 1000 MCG tablet Take 100 mcg by mouth every 12 (twelve) hours.      diazePAM (VALIUM) 5 MG tablet Take 1 tablet (5 mg total) by mouth 3 (three) times daily as needed (pelvic floor dysfunction).  Qty: 60 tablet, Refills: 0     Associated Diagnoses: Pelvic pain in female      estradiol (ESTRACE) 2 MG tablet       gabapentin (NEURONTIN) 300 MG capsule Take 1 capsule (300 mg total) by mouth 3 (three) times daily.  Qty: 90 capsule, Refills: 11    Associated Diagnoses: Pelvic pain in female      ibuprofen (ADVIL,MOTRIN) 200 MG tablet Take 200 mg by mouth every 6 (six) hours as needed for Pain.      Lactobacillus rhamnosus GG (CULTURELLE) 10 billion cell capsule Take 1 capsule by mouth once daily.      LORazepam (ATIVAN) 1 MG tablet Take 1 tablet (1 mg total) by mouth every 6 (six) hours as needed for Anxiety.  Qty: 30 tablet, Refills: 0    Associated Diagnoses: Pelvic pain      methen-m.blue-s.phos-phsal-hyo (URIBEL) 118-10-40.8-36 mg Cap Take 1 capsule by mouth once daily.  Qty: 60 capsule, Refills: 0      omega-3 acid ethyl esters (LOVAZA) 1 gram capsule Take 1 g by mouth once daily.      tamsulosin (FLOMAX) 0.4 mg Cap Take 1 capsule (0.4 mg total) by mouth every evening.  Qty: 30 capsule, Refills: 11    Associated Diagnoses: OAB (overactive bladder); Pelvic pain      venlafaxine (EFFEXOR-XR) 75 MG 24 hr capsule            Discharge Procedure Orders   Call MD for:  persistent nausea and vomiting or diarrhea     Call MD for:  severe uncontrolled pain     Call MD for:  persistent dizziness, light-headedness, or visual disturbances     Call MD for:   Order Comments: Temperature > 101F     Follow-up Information     Donny Calle MD.    Specialty:  Urology  Contact information:  [address] [place] [address] [phone]                   Discharge Procedure Orders (must include Diet, Follow-up, Activity):   Discharge Procedure Orders (must include Diet, Follow-up, Activity)   Call MD for:  persistent nausea and vomiting or diarrhea     Call MD for:  severe uncontrolled pain     Call MD for:  persistent dizziness, light-headedness, or visual disturbances     Call MD for:   Order Comments: Temperature > 101F

## 2019-07-31 NOTE — PLAN OF CARE
Patient and family state they are ready to be discharged. Instructions and prescription given to patient and family. Both verbalize understanding. Patient tolerating po liquids with no difficulty. Patient states pain is at a tolerable level for them. Anesthesia consent and surgical consent in chart upon patient's discharge from [facility].

## 2019-07-31 NOTE — DISCHARGE INSTRUCTIONS

## 2019-07-31 NOTE — TRANSFER OF CARE
"Anesthesia Transfer of Care Note    Patient: Chelsea Oconnell    Procedure(s) Performed: Procedure(s) (LRB):  INSERTION, ELECTRODE LEAD, NEUROSTIMULATOR, SACRAL (Right)  FLUOROSCOPY (N/A)    Patient location: PACU    Anesthesia Type: MAC    Transport from OR: Transported from OR on room air with adequate spontaneous ventilation    Post pain: adequate analgesia    Post assessment: no apparent anesthetic complications and tolerated procedure well    Post vital signs: stable    Level of consciousness: alert and awake    Nausea/Vomiting: no nausea/vomiting    Complications: none    Transfer of care protocol was followed      Last vitals:   Visit Vitals  /63 (BP Location: Left arm, Patient Position: Lying)   Pulse 78   Temp 36.7 °C (98.1 °F) (Oral)   Resp 16   Ht 5' 2" (1.575 m)   Wt 66.2 kg (146 lb)   SpO2 97%   Breastfeeding? No   BMI 26.70 kg/m²     "

## 2019-08-05 ENCOUNTER — TELEPHONE (OUTPATIENT)
Dept: UROLOGY | Facility: CLINIC | Age: 39
End: 2019-08-05

## 2019-08-05 DIAGNOSIS — R33.9 INCOMPLETE BLADDER EMPTYING: Primary | ICD-10-CM

## 2019-08-06 ENCOUNTER — OFFICE VISIT (OUTPATIENT)
Dept: UROLOGY | Facility: CLINIC | Age: 39
End: 2019-08-06
Payer: COMMERCIAL

## 2019-08-06 DIAGNOSIS — N32.81 OAB (OVERACTIVE BLADDER): ICD-10-CM

## 2019-08-06 DIAGNOSIS — R33.9 INCOMPLETE BLADDER EMPTYING: Primary | ICD-10-CM

## 2019-08-06 DIAGNOSIS — R10.2 PELVIC PAIN IN FEMALE: ICD-10-CM

## 2019-08-06 PROCEDURE — 99999 PR PBB SHADOW E&M-EST. PATIENT-LVL III: CPT | Mod: PBBFAC,,, | Performed by: UROLOGY

## 2019-08-06 PROCEDURE — 99214 PR OFFICE/OUTPT VISIT, EST, LEVL IV, 30-39 MIN: ICD-10-PCS | Mod: 57,S$GLB,, | Performed by: UROLOGY

## 2019-08-06 PROCEDURE — 99999 PR PBB SHADOW E&M-EST. PATIENT-LVL III: ICD-10-PCS | Mod: PBBFAC,,, | Performed by: UROLOGY

## 2019-08-06 PROCEDURE — 99214 OFFICE O/P EST MOD 30 MIN: CPT | Mod: 57,S$GLB,, | Performed by: UROLOGY

## 2019-08-06 NOTE — TELEPHONE ENCOUNTER
Incomplete bladder emptying  -     Case Request Operating Room: INSERTION, NEUROSTIMULATOR, PERMANENT, SACRAL

## 2019-08-06 NOTE — H&P (VIEW-ONLY)
CC: refractory IC ( bladder pain), want to get her bladder removed!    Chelsea Oconnell is a 38 y.o. woman who is here for the evaluation of over active bladder (states 50 % improvement with stage 1 interstim)  s/p InterStim stage I for Incomplete bladder emptying and OAB on 7/31/19.  She reports that InterStim test stimulation improved her voiding function as following:  Day time frequency every 2 hours to only 5 x a day.  Nocturia 3 x a night to 1 x a night.  Much better voiding and easier urination.  Even her pelvic pain is much better.  Reports at least 80 % improvement in overall her urinary symptoms.    Initially saw her on 5/7/19 by her urologist in [place], [place] for refractory IC.  She lives in [place], [place] and has seen many urologists over the years.  She presents with at least 5 year hx of refractory bladder and pelvic pain with frequency, urgency, difficulty of emptying bladder.  Underwent many treatment for her urinary problems but with minimal improvement.  C/o constant pain in the bladder and pelvic area.  She wishes to get her bladder removed but her urologist in [place] did not do such surgery and refer her to me.  Had botox injection to the bladder but developed urinary retention and has done CIC for 1 year.  Now she is able to urinate but only small volume at a time with straining.    Urination symptoms prior to InterStim Therapy: Positive for frequency, urgency, nocturia and incomplete emptying, straining.  Denies flank pain     Had 5 c-sections, 4 to 5 Gyn surgeries including hysterectomy ( no malignancy).  S/p removal of liver hemangioma.    So underwent FUDS and cysto under anesthesia on 6/5/19 by me.    Procedure Date:  06/05/2019  Pre-OP Diagnosis:   Difficulty in voiding, frequency and urgency, intermittency, incomplete bladder emptying, urethral / bladder pain  Post-OP Diagnosis:   Same, pelvic floor dysfunction  Procedure:   Complex Cystometrogram   Voiding / Pressure Study with  Intrarectal Balloon   Complex Uroflow   Electromyogram of Anal Sphincter.   Fluoroscopy less than 1 hour  Findings:   Initial Uroflow study: voided 20 ml, PVR 0 ml, Qmax 2 ml/sec  --- Bladder ---   CYSTOMETROGRAM ( Filling Phase ):   Cystometric Numeric Data:   - First Desire (Sensation): 9 mL at 1 cm of water pressure.   - Normal Desire: 18 mL at 1 cm of water.   - Strong Desire: 31 mL at 1 cm of water.   - Urgency (Imminent Void) : 48 mL at 19 cm of water.   - Maximum Cystometric Capacity: 85 mL at 25 cm of water pressure.   Compliance:   - low.   Leak Point Pressure:   - Valsalva ( Abdominal ) Leak Point Pressure: none.   UROFLOW:   - Voided Volume: unable to void, catheter removed but still unable to void.  At the end, she uses a bathroom, urinated 100 mL.   - Residual Urine: more than 100 mL.   - Maximum Flow Rate: n/a mL/sec.   - Flow Pattern: n/a but suspect an intermittent, abdominal straining.   VOIDING PRESSURE STUDY ( Voiding Phase ):   Detrusor Pressure:   - Maximum Detrusor Pressure: na.   - Detrusor Pressure at Maximum Flow: na.   - Detrusor Contraction Characteristics: na .   ELECTROMYOGRAM:   - unable to relax at the time of voiding  CONCLUSIONS:   1. Pelvic floor dysfunction  2. Chronic pelvic pain  3. Incomplete bladder emptying     Proceed with cysto under anesthesia with hydrodistention, urethral dilation.  Consider Physical Therapy, flomax and valium, possible InterStim Therapy    Procedure(s) Performed: 6/5/19  1.  Cystoscopy with hydrodistension  2.  Urethral dilation  3.  Bladder instillation  Findings:   - 1L anesthetic bladder capacity  - Diffuse bladder erythema, minimal glomerulations, no Hunner lesions  - Urethra dilated to 32 Fr with urethral sounds    Since her cystoscopic surgery, she is able to void somewhat better on flomax.  Was unable to undergo physical therapy due to her insurance coverage.  Vaginal suppository somewhat helpful, but is not able to use valium into the vagina due  to recurrent yeast infection.  She has taken valium orally at night which has helped her.  Has been taking ibuprofen 200 mg 3 to 4 pills at a time 2 to 3 x a day for chronic pelvic pain.    C/o constant pelvic and urethral pain regardless whether her bladder is full or not.  She reports that she does not feel emptying her bladder completely.  She voids at least every 1 hour.  Has been on Uribel at least every other day due to her chronic bladder pain.      Past Medical History:   Diagnosis Date    Crohn's colitis     Lupus (systemic lupus erythematosus)      Past Surgical History:   Procedure Laterality Date    CYSTOSCOPY, WITH BLADDER HYDRODISTENSION AND BIOPSY N/A 6/5/2019    [facility]    DILATION, URETHRA N/A 6/5/2019    [facility]    FLUOROSCOPY N/A 7/31/2019    [facility] 2ND [facility]    HYSTERECTOMY      INSERTION, ELECTRODE LEAD, NEUROSTIMULATOR, SACRAL Right 7/31/2019    [facility]    INSTILLATION, BLADDER N/A 6/5/2019    [facility]    liver      hemangioma removed    URODYNAMIC STUDY, FLUOROSCOPIC N/A 6/5/2019    [facility]     Social History     Tobacco Use    Smoking status: Never Smoker    Smokeless tobacco: Never Used   Substance Use Topics    Alcohol use: Never     Frequency: Never    Drug use: Never     No family history on file.  Allergy:  Review of patient's allergies indicates:   Allergen Reactions    Iodine and iodide containing products Rash and Swelling    Shellfish containing products Rash and Swelling    Sulfa (sulfonamide antibiotics) Rash and Swelling    Xanax [alprazolam] Hives     Outpatient Encounter Medications as of 8/6/2019   Medication Sig Dispense Refill    amitriptyline (ELAVIL) 25 MG tablet Take 1 tablet (25 mg total) by mouth nightly as needed  for Insomnia. One by mouth every evening at bedtime to help with bladder pain and sleeping 30 tablet 11    calcium glycerophosphate (PRELIEF ORAL) Take 2 mg by mouth once daily.      [] cephALEXin (KEFLEX) 500 MG capsule Take 1 capsule (500 mg total) by mouth every 8 (eight) hours. for 5 days 15 capsule 0    cyanocobalamin (VITAMIN B-12) 1000 MCG tablet Take 100 mcg by mouth every 12 (twelve) hours.      diazePAM (VALIUM) 5 MG tablet Take 1 tablet (5 mg total) by mouth 3 (three) times daily as needed (pelvic floor dysfunction). 60 tablet 0    estradiol (ESTRACE) 2 MG tablet       gabapentin (NEURONTIN) 300 MG capsule Take 1 capsule (300 mg total) by mouth 3 (three) times daily. 90 capsule 11    HYDROcodone-acetaminophen (NORCO) 5-325 mg per tablet Take 1 tablet by mouth every 6 (six) hours as needed for Pain. 11 tablet 0    ibuprofen (ADVIL,MOTRIN) 200 MG tablet Take 200 mg by mouth every 6 (six) hours as needed for Pain.      Lactobacillus rhamnosus GG (CULTURELLE) 10 billion cell capsule Take 1 capsule by mouth once daily.      LORazepam (ATIVAN) 1 MG tablet Take 1 tablet (1 mg total) by mouth every 6 (six) hours as needed for Anxiety. 30 tablet 0    methen-m.blue-s.phos-phsal-hyo (URIBEL) 118-10-40.8-36 mg Cap Take 1 capsule by mouth once daily. 60 capsule 0    omega-3 acid ethyl esters (LOVAZA) 1 gram capsule Take 1 g by mouth once daily.      tamsulosin (FLOMAX) 0.4 mg Cap Take 1 capsule (0.4 mg total) by mouth every evening. 30 capsule 11    venlafaxine (EFFEXOR-XR) 75 MG 24 hr capsule        No facility-administered encounter medications on file as of 2019.      Review of Systems   ROS  Physical Exam     There were no vitals filed for this visit.  Physical Exam   Constitutional: She is oriented to person, place, and time. She appears well-developed and well-nourished. No distress.   HENT:   Head: Normocephalic and atraumatic.   Right Ear: External ear normal.   Left Ear: External ear  normal.   Nose: Nose normal.   Eyes: Conjunctivae and EOM are normal. Pupils are equal, round, and reactive to light. No scleral icterus.   Neck: Normal range of motion. Neck supple. No JVD present. No tracheal deviation present. No thyromegaly present.   Cardiovascular: Normal rate, regular rhythm, normal heart sounds and intact distal pulses.  Exam reveals no gallop and no friction rub.    No murmur heard.  Pulmonary/Chest: Effort normal and breath sounds normal. No respiratory distress. She has no wheezes.   Abdominal: Soft. Bowel sounds are normal. She exhibits no distension and no mass. There is no tenderness. There is no rebound and no guarding.   Genitourinary: Vagina normal and uterus normal. No vaginal discharge found.   Genitourinary Comments: No tenderness at the urethra.  Positive tenderness at the kerrie-urethral area on the both sides.  Positive tenderness on palpation each side of vaginal wall.  No tenderness on palpation at the base of the bladder.    Difficulty in localizing the levator ani on YEE of rectum.  Slight tenderness noted on the levator ani.   Musculoskeletal: Normal range of motion. She exhibits no edema, tenderness or deformity.   Lymphadenopathy:     She has no cervical adenopathy.   Neurological: She is alert and oriented to person, place, and time.   Skin: Skin is warm and dry. She is not diaphoretic.     Psychiatric: She has a normal mood and affect. Her behavior is normal. Thought content normal.     Wound: clean dry and intact.  Extension cable is coming out from the right upper hip area.  Electrode and future generator site at the left upper hip area is well healed.    LABS:  Lab Results   Component Value Date    CREATININE 0.8 05/07/2019     Urine Culture, Routine   Date Value Ref Range Status   05/07/2019 No growth  Final         Assessment and Plan:  Chelsea was seen today for over active bladder.    Diagnoses and all orders for this visit:    Incomplete bladder  emptying    OAB (overactive bladder)    Pelvic pain in female    successful response from InterStim test stimulation.  Will schedule her for stage II InterStim Therapy to complete her treatment.  Patient understands and agrees to undergo the proposed procedure.   All questions answered.    Continue flomax, elavil, valium prn.  I spent 25 minutes with the patient of which more than half was spent in direct consultation with the patient in regards to our treatment and plan.    Follow-up:  Follow up in about 8 days (around 8/14/2019), or Stage II InterStim Therapy.

## 2019-08-06 NOTE — PROGRESS NOTES
CC: refractory IC ( bladder pain), want to get her bladder removed!    Chelsea Oconnell is a 38 y.o. woman who is here for the evaluation of over active bladder (states 50 % improvement with stage 1 interstim)  s/p InterStim stage I for Incomplete bladder emptying and OAB on 7/31/19.  She reports that InterStim test stimulation improved her voiding function as following:  Day time frequency every 2 hours to only 5 x a day.  Nocturia 3 x a night to 1 x a night.  Much better voiding and easier urination.  Even her pelvic pain is much better.  Reports at least 80 % improvement in overall her urinary symptoms.    Initially saw her on 5/7/19 by her urologist in [place], [place] for refractory IC.  She lives in [place], [place] and has seen many urologists over the years.  She presents with at least 5 year hx of refractory bladder and pelvic pain with frequency, urgency, difficulty of emptying bladder.  Underwent many treatment for her urinary problems but with minimal improvement.  C/o constant pain in the bladder and pelvic area.  She wishes to get her bladder removed but her urologist in [place] did not do such surgery and refer her to me.  Had botox injection to the bladder but developed urinary retention and has done CIC for 1 year.  Now she is able to urinate but only small volume at a time with straining.    Urination symptoms prior to InterStim Therapy: Positive for frequency, urgency, nocturia and incomplete emptying, straining.  Denies flank pain     Had 5 c-sections, 4 to 5 Gyn surgeries including hysterectomy ( no malignancy).  S/p removal of liver hemangioma.    So underwent FUDS and cysto under anesthesia on 6/5/19 by me.    Procedure Date:  06/05/2019  Pre-OP Diagnosis:   Difficulty in voiding, frequency and urgency, intermittency, incomplete bladder emptying, urethral / bladder pain  Post-OP Diagnosis:   Same, pelvic floor dysfunction  Procedure:   Complex Cystometrogram   Voiding / Pressure Study with  Intrarectal Balloon   Complex Uroflow   Electromyogram of Anal Sphincter.   Fluoroscopy less than 1 hour  Findings:   Initial Uroflow study: voided 20 ml, PVR 0 ml, Qmax 2 ml/sec  --- Bladder ---   CYSTOMETROGRAM ( Filling Phase ):   Cystometric Numeric Data:   - First Desire (Sensation): 9 mL at 1 cm of water pressure.   - Normal Desire: 18 mL at 1 cm of water.   - Strong Desire: 31 mL at 1 cm of water.   - Urgency (Imminent Void) : 48 mL at 19 cm of water.   - Maximum Cystometric Capacity: 85 mL at 25 cm of water pressure.   Compliance:   - low.   Leak Point Pressure:   - Valsalva ( Abdominal ) Leak Point Pressure: none.   UROFLOW:   - Voided Volume: unable to void, catheter removed but still unable to void.  At the end, she uses a bathroom, urinated 100 mL.   - Residual Urine: more than 100 mL.   - Maximum Flow Rate: n/a mL/sec.   - Flow Pattern: n/a but suspect an intermittent, abdominal straining.   VOIDING PRESSURE STUDY ( Voiding Phase ):   Detrusor Pressure:   - Maximum Detrusor Pressure: na.   - Detrusor Pressure at Maximum Flow: na.   - Detrusor Contraction Characteristics: na .   ELECTROMYOGRAM:   - unable to relax at the time of voiding  CONCLUSIONS:   1. Pelvic floor dysfunction  2. Chronic pelvic pain  3. Incomplete bladder emptying     Proceed with cysto under anesthesia with hydrodistention, urethral dilation.  Consider Physical Therapy, flomax and valium, possible InterStim Therapy    Procedure(s) Performed: 6/5/19  1.  Cystoscopy with hydrodistension  2.  Urethral dilation  3.  Bladder instillation  Findings:   - 1L anesthetic bladder capacity  - Diffuse bladder erythema, minimal glomerulations, no Hunner lesions  - Urethra dilated to 32 Fr with urethral sounds    Since her cystoscopic surgery, she is able to void somewhat better on flomax.  Was unable to undergo physical therapy due to her insurance coverage.  Vaginal suppository somewhat helpful, but is not able to use valium into the vagina due  to recurrent yeast infection.  She has taken valium orally at night which has helped her.  Has been taking ibuprofen 200 mg 3 to 4 pills at a time 2 to 3 x a day for chronic pelvic pain.    C/o constant pelvic and urethral pain regardless whether her bladder is full or not.  She reports that she does not feel emptying her bladder completely.  She voids at least every 1 hour.  Has been on Uribel at least every other day due to her chronic bladder pain.      Past Medical History:   Diagnosis Date    Crohn's colitis     Lupus (systemic lupus erythematosus)      Past Surgical History:   Procedure Laterality Date    CYSTOSCOPY, WITH BLADDER HYDRODISTENSION AND BIOPSY N/A 6/5/2019    [facility]    DILATION, URETHRA N/A 6/5/2019    [facility]    FLUOROSCOPY N/A 7/31/2019    [facility] 2[facility]    HYSTERECTOMY      INSERTION, ELECTRODE LEAD, NEUROSTIMULATOR, SACRAL Right 7/31/2019    [facility] 2ND [facility]    INSTILLATION, BLADDER N/A 6/5/2019    [facility]    liver      hemangioma removed    URODYNAMIC STUDY, FLUOROSCOPIC N/A 6/5/2019    [facility]     Social History     Tobacco Use    Smoking status: Never Smoker    Smokeless tobacco: Never Used   Substance Use Topics    Alcohol use: Never     Frequency: Never    Drug use: Never     No family history on file.  Allergy:  Review of patient's allergies indicates:   Allergen Reactions    Iodine and iodide containing products Rash and Swelling    Shellfish containing products Rash and Swelling    Sulfa (sulfonamide antibiotics) Rash and Swelling    Xanax [alprazolam] Hives     Outpatient Encounter Medications as of 8/6/2019   Medication Sig Dispense Refill    amitriptyline (ELAVIL) 25 MG tablet Take 1 tablet (25 mg total) by mouth nightly as needed  for Insomnia. One by mouth every evening at bedtime to help with bladder pain and sleeping 30 tablet 11    calcium glycerophosphate (PRELIEF ORAL) Take 2 mg by mouth once daily.      [] cephALEXin (KEFLEX) 500 MG capsule Take 1 capsule (500 mg total) by mouth every 8 (eight) hours. for 5 days 15 capsule 0    cyanocobalamin (VITAMIN B-12) 1000 MCG tablet Take 100 mcg by mouth every 12 (twelve) hours.      diazePAM (VALIUM) 5 MG tablet Take 1 tablet (5 mg total) by mouth 3 (three) times daily as needed (pelvic floor dysfunction). 60 tablet 0    estradiol (ESTRACE) 2 MG tablet       gabapentin (NEURONTIN) 300 MG capsule Take 1 capsule (300 mg total) by mouth 3 (three) times daily. 90 capsule 11    HYDROcodone-acetaminophen (NORCO) 5-325 mg per tablet Take 1 tablet by mouth every 6 (six) hours as needed for Pain. 11 tablet 0    ibuprofen (ADVIL,MOTRIN) 200 MG tablet Take 200 mg by mouth every 6 (six) hours as needed for Pain.      Lactobacillus rhamnosus GG (CULTURELLE) 10 billion cell capsule Take 1 capsule by mouth once daily.      LORazepam (ATIVAN) 1 MG tablet Take 1 tablet (1 mg total) by mouth every 6 (six) hours as needed for Anxiety. 30 tablet 0    methen-m.blue-s.phos-phsal-hyo (URIBEL) 118-10-40.8-36 mg Cap Take 1 capsule by mouth once daily. 60 capsule 0    omega-3 acid ethyl esters (LOVAZA) 1 gram capsule Take 1 g by mouth once daily.      tamsulosin (FLOMAX) 0.4 mg Cap Take 1 capsule (0.4 mg total) by mouth every evening. 30 capsule 11    venlafaxine (EFFEXOR-XR) 75 MG 24 hr capsule        No facility-administered encounter medications on file as of 2019.      Review of Systems   ROS  Physical Exam     There were no vitals filed for this visit.  Physical Exam   Constitutional: She is oriented to person, place, and time. She appears well-developed and well-nourished. No distress.   HENT:   Head: Normocephalic and atraumatic.   Right Ear: External ear normal.   Left Ear: External ear  normal.   Nose: Nose normal.   Eyes: Conjunctivae and EOM are normal. Pupils are equal, round, and reactive to light. No scleral icterus.   Neck: Normal range of motion. Neck supple. No JVD present. No tracheal deviation present. No thyromegaly present.   Cardiovascular: Normal rate, regular rhythm, normal heart sounds and intact distal pulses.  Exam reveals no gallop and no friction rub.    No murmur heard.  Pulmonary/Chest: Effort normal and breath sounds normal. No respiratory distress. She has no wheezes.   Abdominal: Soft. Bowel sounds are normal. She exhibits no distension and no mass. There is no tenderness. There is no rebound and no guarding.   Genitourinary: Vagina normal and uterus normal. No vaginal discharge found.   Genitourinary Comments: No tenderness at the urethra.  Positive tenderness at the kerrie-urethral area on the both sides.  Positive tenderness on palpation each side of vaginal wall.  No tenderness on palpation at the base of the bladder.    Difficulty in localizing the levator ani on YEE of rectum.  Slight tenderness noted on the levator ani.   Musculoskeletal: Normal range of motion. She exhibits no edema, tenderness or deformity.   Lymphadenopathy:     She has no cervical adenopathy.   Neurological: She is alert and oriented to person, place, and time.   Skin: Skin is warm and dry. She is not diaphoretic.     Psychiatric: She has a normal mood and affect. Her behavior is normal. Thought content normal.     Wound: clean dry and intact.  Extension cable is coming out from the right upper hip area.  Electrode and future generator site at the left upper hip area is well healed.    LABS:  Lab Results   Component Value Date    CREATININE 0.8 05/07/2019     Urine Culture, Routine   Date Value Ref Range Status   05/07/2019 No growth  Final         Assessment and Plan:  Chelsea was seen today for over active bladder.    Diagnoses and all orders for this visit:    Incomplete bladder  emptying    OAB (overactive bladder)    Pelvic pain in female    successful response from InterStim test stimulation.  Will schedule her for stage II InterStim Therapy to complete her treatment.  Patient understands and agrees to undergo the proposed procedure.   All questions answered.    Continue flomax, elavil, valium prn.  I spent 25 minutes with the patient of which more than half was spent in direct consultation with the patient in regards to our treatment and plan.    Follow-up:  Follow up in about 8 days (around 8/14/2019), or Stage II InterStim Therapy.

## 2019-08-07 ENCOUNTER — ANESTHESIA EVENT (OUTPATIENT)
Dept: SURGERY | Facility: HOSPITAL | Age: 39
End: 2019-08-07
Payer: COMMERCIAL

## 2019-08-07 DIAGNOSIS — Z01.818 PREOPERATIVE TESTING: Primary | ICD-10-CM

## 2019-08-07 NOTE — ANESTHESIA PREPROCEDURE EVALUATION
[facility]  Anesthesia Pre-Operative Evaluation         Patient Name: Chelsea Oconnell  YOB: 1980  MRN: [id number]    SUBJECTIVE:     Pre-operative evaluation for Procedure(s) (LRB):  INSERTION, NEUROSTIMULATOR, PERMANENT, SACRAL (N/A)     08/13/2019    Chelsea Oconnell is a 38 y.o. female w/ a significant PMHx of lupus, crohn's colitis,     Pt reports over active bladder (states 50 % improvement with stage 1 interstim).    Patient now presents for the above procedure(s).      LDA: None documented.       Prev airway: None documented.    Drips: None documented.      Patient Active Problem List   Diagnosis    IC (interstitial cystitis)    Bladder pain    OAB (overactive bladder)    Interstitial cystitis    Incomplete bladder emptying    Pelvic pain in female    Incomplete emptying of bladder       Review of patient's allergies indicates:   Allergen Reactions    Adhesive Hives     Had reaction to surgical tape after last procedure    Iodine and iodide containing products Rash and Swelling    Shellfish containing products Rash and Swelling    Sulfa (sulfonamide antibiotics) Rash and Swelling    Xanax [alprazolam] Hives       Current Inpatient Medications:      No current facility-administered medications on file prior to encounter.      Current Outpatient Medications on File Prior to Encounter   Medication Sig Dispense Refill    amitriptyline (ELAVIL) 25 MG tablet Take 1 tablet (25 mg total) by mouth nightly as needed for Insomnia. One by mouth every evening at bedtime to help with bladder pain and sleeping 30 tablet 11    calcium glycerophosphate (PRELIEF ORAL) Take 2 mg by mouth once daily.      cyanocobalamin (VITAMIN B-12) 1000 MCG tablet Take 100 mcg by mouth every 12 (twelve) hours.      diazePAM (VALIUM) 5 MG tablet Take 1 tablet (5 mg total) by mouth 3 (three) times daily as needed (pelvic floor dysfunction). 60 tablet 0    estradiol (ESTRACE) 2 MG tablet        gabapentin (NEURONTIN) 300 MG capsule Take 1 capsule (300 mg total) by mouth 3 (three) times daily. 90 capsule 11    ibuprofen (ADVIL,MOTRIN) 200 MG tablet Take 200 mg by mouth every 6 (six) hours as needed for Pain.      Lactobacillus rhamnosus GG (CULTURELLE) 10 billion cell capsule Take 1 capsule by mouth once daily.      LORazepam (ATIVAN) 1 MG tablet Take 1 tablet (1 mg total) by mouth every 6 (six) hours as needed for Anxiety. 30 tablet 0    methen-m.blue-s.phos-phsal-hyo (URIBEL) 118-10-40.8-36 mg Cap Take 1 capsule by mouth once daily. 60 capsule 0    tamsulosin (FLOMAX) 0.4 mg Cap Take 1 capsule (0.4 mg total) by mouth every evening. 30 capsule 11    venlafaxine (EFFEXOR-XR) 75 MG 24 hr capsule       omega-3 acid ethyl esters (LOVAZA) 1 gram capsule Take 1 g by mouth once daily.         Past Surgical History:   Procedure Laterality Date    CYSTOSCOPY, WITH BLADDER HYDRODISTENSION AND BIOPSY N/A 6/5/2019    Performed by [facility] at [facility] OR 1ST FLR    DILATION, URETHRA N/A 6/5/2019    Performed by [facility] at [facility] OR 1ST FLR    FLUOROSCOPY N/A 7/31/2019    Performed by [facility]le MD at [facility] OR 2ND FLR    HYSTERECTOMY      INSERTION, ELECTRODE LEAD, NEUROSTIMULATOR, SACRAL Right 7/31/2019    Performed by o[facility] Calle [facility] at [facility] OR 2ND FLR    INSTILLATION, BLADDER N/A 6/5/2019    Performed by [facility] MD at [facility] OR 1ST FLR    liver      hemangioma removed    URODYNAMIC STUDY, FLUOROSCOPIC N/A 6/5/2019    Performed by [facility] Cal[facility] MD at [facility] OR 1ST FLR       Social History     Socioeconomic History    Marital status:      Spouse name: Not on file    Number of children: Not on file    Years of education: Not on file    Highest education level: Not on file   Occupational History    Not on file   Social Needs    Financial resource strain: Not on file    Food insecurity:     Worry: Not on file     Inability: Not on file    Transportation needs:      Medical: Not on file     Non-medical: Not on file   Tobacco Use    Smoking status: Never Smoker    Smokeless tobacco: Never Used   Substance and Sexual Activity    Alcohol use: Never     Frequency: Never    Drug use: Never    Sexual activity: Not on file   Lifestyle    Physical activity:     Days per week: Not on file     Minutes per session: Not on file    Stress: Not on file   Relationships    Social connections:     Talks on phone: Not on file     Gets together: Not on file     Attends Rastafarian service: Not on file     Active member of club or organization: Not on file     Attends meetings of clubs or organizations: Not on file     Relationship status: Not on file   Other Topics Concern    Not on file   Social History Narrative    Not on file       OBJECTIVE:     Vital Signs Range (Last 24H):         Significant Labs:  Lab Results   Component Value Date    WBC 5.86 05/07/2019    HGB 14.1 05/07/2019    HCT 42.9 05/07/2019     05/07/2019    ALT 15 05/07/2019    AST 18 05/07/2019     05/07/2019    K 4.1 05/07/2019     05/07/2019    CREATININE 0.8 05/07/2019    BUN 9 05/07/2019    CO2 31 (H) 05/07/2019       Diagnostic Studies: No relevant studies.    EKG: No results found for this or any previous visit.    ECHOCARDIOGRAM:  TTE:  No results found for this or any previous visit.      SHARON:  No results found for this or any previous visit.      STRESS:  No results found for this or any previous visit.        ASSESSMENT/PLAN:         Nisha Monson RN   Registered Nurse      Pre Admission Screening   Signed                     Anesthesia Assessment: Preoperative EQUATION     Planned Procedure: Procedure(s) (LRB):  INSERTION, NEUROSTIMULATOR, PERMANENT, SACRAL (N/A)  Requested Anesthesia Type:Monitor Anesthesia Care  Surgeon: Donny Calle MD  Service: Urology  Known or anticipated Date of Surgery:8/14/2019     Surgeon notes: reviewed     Electronic QUestionnaire Assessment completed via  nurse interview with patient.      NO AQ     Triage considerations:      The patient has no apparent active cardiac condition (No unstable coronary Syndrome such as severe unstable angina or recent [<1 month] myocardial infarction, decompensated CHF, severe valvular   disease or significant arrhythmia)     Previous anesthesia records:GETA and No problems  7/31/2019  INSERTION, NEUROSTIMULATOR, TEMPORARY, SACRAL FLUOR   Airway/Jaw/Neck:  Airway Findings: Mouth Opening: Normal Tongue: Normal  General Airway Assessment: Adult  Mallampati: II  TM Distance: Normal, at least 6 cm Jaw/Neck Findings:  Neck ROM: Normal ROM        Last PCP note: 6-12 months ago , outside [facility]  Dr. Amaro  Subspecialty notes: n/a     Other important co-morbidities: Lupus, OAB, [place], Crohn's colitis     Tests already available:  No recent tests. Available tests,  3-6 months ago , within [facility] . 5/7/2019 CBC, CMP                            Instructions given. (See in Nurse's note)     Optimization:  Anesthesia Preop Clinic Assessment  Indicated: Not required for this procedure                Plan:    Testing:  Hematology Profile (AM of surgery, pt lives out of town)                           Patient  has previously scheduled Medical Appointment: Not at this time prior to surgery        Navigation: Tests Scheduled. (AM of surgery)                        Straight Line to surgery.                          No anesthesia preop clinic visit, or consult required.                                                                                                                           08/07/2019  Chelsea Oconnell is a 38 y.o., female.    Anesthesia Evaluation         Review of Systems  Anesthesia Hx:  No problems with previous Anesthesia Pt prefers to keep her hearing aids (red on right, blue on left) History of prior surgery of interest to airway management or planning: Previous anesthesia: General 7/31/2019 INSERTION, NEUROSTIMULATOR, TEMPORARY,  SACRAL FLUOR  with general anesthesia.  Procedure performed at an [facility]. Denies Family Hx of Anesthesia complications.   Denies Personal Hx of Anesthesia complications.   Social:  No Alcohol Use, Non-Smoker    Hematology/Oncology:     Oncology Normal   Hematology Comments: luupus well controlled right now   EENT/Dental:   Ears General/Symptom(s) Hearing Impairment: hearing-aid left, hearing-aid right    Cardiovascular:   Denies Hypertension.  Denies MI.    Denies Angina. Walks a mile on occasion. Functional Capacity good / => 4 METS    Pulmonary:  Pulmonary Normal  Denies COPD.  Denies Asthma.  Denies Shortness of breath.  Denies Recent URI.    Renal/:  Renal Symptoms/Infections/Stones: frequency, urgency.  Other Renal / Gu Conditions: (possible pelvic floor dysfunction) Interstitial Cystitis  Hepatic/GI:   Liver Disease, (had liver resection for hemangioma )  Liver Disease S/P resection of liver hemangioma   Musculoskeletal:  Rheumatic Disease, Lupus    Neurological:  Neurology Normal Denies TIA.  Denies CVA. Denies Seizures.    Endocrine:  Endocrine Normal Denies Diabetes.    Psych:   depression             Anesthesia Plan  Type of Anesthesia, risks & benefits discussed:  Anesthesia Type:  general  Patient's Preference:   Intra-op Monitoring Plan: standard ASA monitors  Intra-op Monitoring Plan Comments:   Post Op Pain Control Plan: per primary service following discharge from PACU, IV/PO Opioids PRN and multimodal analgesia  Post Op Pain Control Plan Comments:   Induction:   IV  Beta Blocker:  Patient is not currently on a Beta-Blocker (No further documentation required).       Informed Consent: Patient understands risks and agrees with Anesthesia plan.  Questions answered. Anesthesia consent signed with patient.  ASA Score: 2     Day of Surgery Review of History & Physical:    H&P update referred to the surgeon.         Ready For Surgery From Anesthesia Perspective.

## 2019-08-07 NOTE — PRE ADMISSION SCREENING
Anesthesia Assessment: Preoperative EQUATION    Planned Procedure: Procedure(s) (LRB):  INSERTION, NEUROSTIMULATOR, PERMANENT, SACRAL (N/A)  Requested Anesthesia Type:Monitor Anesthesia Care  Surgeon: Donny Calle MD  Service: Urology  Known or anticipated Date of Surgery:8/14/2019    Surgeon notes: reviewed    Electronic QUestionnaire Assessment completed via nurse interview with patient.      NO AQ    Triage considerations:     The patient has no apparent active cardiac condition (No unstable coronary Syndrome such as severe unstable angina or recent [<1 month] myocardial infarction, decompensated CHF, severe valvular   disease or significant arrhythmia)    Previous anesthesia records:GETA and No problems  7/31/2019  INSERTION, NEUROSTIMULATOR, TEMPORARY, SACRAL FLUOR   Airway/Jaw/Neck:  Airway Findings: Mouth Opening: Normal Tongue: Normal  General Airway Assessment: Adult  Mallampati: II  TM Distance: Normal, at least 6 cm Jaw/Neck Findings:  Neck ROM: Normal ROM       Last PCP note: 6-12 months ago , outside [facility]  Dr. Amaro  Subspecialty notes: n/a    Other important co-morbidities: Lupus, OAB, [place], Crohn's colitis     Tests already available:  No recent tests. Available tests,  3-6 months ago , within [facility] . 5/7/2019 CBC, CMP            Instructions given. (See in Nurse's note)    Optimization:  Anesthesia Preop Clinic Assessment  Indicated: Not required for this procedure      Plan:    Testing:  Hematology Profile (AM of surgery, pt lives out of town)      Patient  has previously scheduled Medical Appointment: Not at this time prior to surgery      Navigation: Tests Scheduled. (AM of surgery)             Straight Line to surgery.               No anesthesia preop clinic visit, or consult required.

## 2019-08-13 ENCOUNTER — TELEPHONE (OUTPATIENT)
Dept: UROLOGY | Facility: CLINIC | Age: 39
End: 2019-08-13

## 2019-08-14 ENCOUNTER — HOSPITAL ENCOUNTER (OUTPATIENT)
Facility: HOSPITAL | Age: 39
Discharge: HOME | End: 2019-08-14
Attending: UROLOGY | Admitting: UROLOGY
Payer: COMMERCIAL

## 2019-08-14 ENCOUNTER — ANESTHESIA (OUTPATIENT)
Dept: SURGERY | Facility: HOSPITAL | Age: 39
End: 2019-08-14
Payer: COMMERCIAL

## 2019-08-14 VITALS
RESPIRATION RATE: 16 BRPM | OXYGEN SATURATION: 99 % | TEMPERATURE: 97 F | HEART RATE: 96 BPM | BODY MASS INDEX: 26.87 KG/M2 | SYSTOLIC BLOOD PRESSURE: 110 MMHG | DIASTOLIC BLOOD PRESSURE: 65 MMHG | WEIGHT: 146 LBS | HEIGHT: 62 IN

## 2019-08-14 DIAGNOSIS — Z01.818 PREOPERATIVE TESTING: ICD-10-CM

## 2019-08-14 DIAGNOSIS — R39.89 BLADDER PAIN: ICD-10-CM

## 2019-08-14 DIAGNOSIS — N30.10 IC (INTERSTITIAL CYSTITIS): Primary | ICD-10-CM

## 2019-08-14 DIAGNOSIS — N32.81 OVERACTIVE BLADDER: ICD-10-CM

## 2019-08-14 LAB
ERYTHROCYTE [DISTWIDTH] IN BLOOD BY AUTOMATED COUNT: 12.2 % (ref 11.5–14.5)
HCT VFR BLD AUTO: 40.2 % (ref 37–48.5)
HGB BLD-MCNC: 13.5 G/DL (ref 12–16)
MCH RBC QN AUTO: 30 PG (ref 27–31)
MCHC RBC AUTO-ENTMCNC: 33.6 G/DL (ref 32–36)
MCV RBC AUTO: 89 FL (ref 82–98)
PLATELET # BLD AUTO: 254 K/UL (ref 150–350)
PMV BLD AUTO: 10.7 FL (ref 9.2–12.9)
RBC # BLD AUTO: 4.5 M/UL (ref 4–5.4)
WBC # BLD AUTO: 5.31 K/UL (ref 3.9–12.7)

## 2019-08-14 PROCEDURE — 85027 COMPLETE CBC AUTOMATED: CPT

## 2019-08-14 PROCEDURE — 95972 PR PRG SPNL GEN CMPLX: ICD-10-PCS | Mod: 59,,, | Performed by: UROLOGY

## 2019-08-14 PROCEDURE — C1767 GENERATOR, NEURO NON-RECHARG: HCPCS | Performed by: UROLOGY

## 2019-08-14 PROCEDURE — 63600175 PHARM REV CODE 636 W HCPCS: Performed by: ANESTHESIOLOGY

## 2019-08-14 PROCEDURE — 37000009 HC ANESTHESIA EA ADD 15 MINS: Performed by: UROLOGY

## 2019-08-14 PROCEDURE — 36000709 HC OR TIME LEV III EA ADD 15 MIN: Performed by: UROLOGY

## 2019-08-14 PROCEDURE — 64590 INS/RPL PRPH SAC/GSTR NPG/R: CPT | Mod: 58,,, | Performed by: UROLOGY

## 2019-08-14 PROCEDURE — 63600175 PHARM REV CODE 636 W HCPCS: Performed by: STUDENT IN AN ORGANIZED HEALTH CARE EDUCATION/TRAINING PROGRAM

## 2019-08-14 PROCEDURE — 63600175 PHARM REV CODE 636 W HCPCS

## 2019-08-14 PROCEDURE — 25000003 PHARM REV CODE 250: Performed by: UROLOGY

## 2019-08-14 PROCEDURE — 71000033 HC RECOVERY, INTIAL HOUR: Performed by: UROLOGY

## 2019-08-14 PROCEDURE — 71000015 HC POSTOP RECOV 1ST HR: Performed by: UROLOGY

## 2019-08-14 PROCEDURE — C1787 PATIENT PROGR, NEUROSTIM: HCPCS | Performed by: UROLOGY

## 2019-08-14 PROCEDURE — 95972 ALYS CPLX SP/PN NPGT W/PRGRM: CPT | Mod: 59,,, | Performed by: UROLOGY

## 2019-08-14 PROCEDURE — 94761 N-INVAS EAR/PLS OXIMETRY MLT: CPT

## 2019-08-14 PROCEDURE — D9220A PRA ANESTHESIA: Mod: ,,, | Performed by: ANESTHESIOLOGY

## 2019-08-14 PROCEDURE — 25000003 PHARM REV CODE 250: Performed by: STUDENT IN AN ORGANIZED HEALTH CARE EDUCATION/TRAINING PROGRAM

## 2019-08-14 PROCEDURE — 37000008 HC ANESTHESIA 1ST 15 MINUTES: Performed by: UROLOGY

## 2019-08-14 PROCEDURE — D9220A PRA ANESTHESIA: ICD-10-PCS | Mod: ,,, | Performed by: ANESTHESIOLOGY

## 2019-08-14 PROCEDURE — 36000708 HC OR TIME LEV III 1ST 15 MIN: Performed by: UROLOGY

## 2019-08-14 PROCEDURE — 64590 PR IMPLANT PERIPH/GASTRIC NEUROSTIM/RECEIVER: ICD-10-PCS | Mod: 58,,, | Performed by: UROLOGY

## 2019-08-14 PROCEDURE — 71000039 HC RECOVERY, EACH ADD'L HOUR: Performed by: UROLOGY

## 2019-08-14 DEVICE — SYS INTERSTIM X RECHARGE FREE: Type: IMPLANTABLE DEVICE | Site: BACK | Status: FUNCTIONAL

## 2019-08-14 RX ORDER — HYDROMORPHONE HYDROCHLORIDE 1 MG/ML
0.2 INJECTION, SOLUTION INTRAMUSCULAR; INTRAVENOUS; SUBCUTANEOUS EVERY 5 MIN PRN
Status: DISCONTINUED | OUTPATIENT
Start: 2019-08-14 | End: 2019-08-14 | Stop reason: HOSPADM

## 2019-08-14 RX ORDER — SODIUM CHLORIDE 0.9 % (FLUSH) 0.9 %
3 SYRINGE (ML) INJECTION
Status: DISCONTINUED | OUTPATIENT
Start: 2019-08-14 | End: 2019-08-14 | Stop reason: HOSPADM

## 2019-08-14 RX ORDER — SODIUM BICARBONATE 1 MEQ/ML
SYRINGE (ML) INTRAVENOUS
Status: DISCONTINUED | OUTPATIENT
Start: 2019-08-14 | End: 2019-08-14 | Stop reason: HOSPADM

## 2019-08-14 RX ORDER — HYDROCODONE BITARTRATE AND ACETAMINOPHEN 5; 325 MG/1; MG/1
TABLET ORAL
Status: DISCONTINUED
Start: 2019-08-14 | End: 2019-08-14 | Stop reason: HOSPADM

## 2019-08-14 RX ORDER — AMOXICILLIN AND CLAVULANATE POTASSIUM 500; 125 MG/1; MG/1
1 TABLET, FILM COATED ORAL 2 TIMES DAILY
Qty: 6 TABLET | Refills: 0 | Status: SHIPPED | OUTPATIENT
Start: 2019-08-14 | End: 2019-08-19

## 2019-08-14 RX ORDER — SODIUM CHLORIDE 9 MG/ML
INJECTION, SOLUTION INTRAVENOUS CONTINUOUS PRN
Status: DISCONTINUED | OUTPATIENT
Start: 2019-08-14 | End: 2019-08-14

## 2019-08-14 RX ORDER — FENTANYL CITRATE 50 UG/ML
25 INJECTION, SOLUTION INTRAMUSCULAR; INTRAVENOUS EVERY 5 MIN PRN
Status: COMPLETED | OUTPATIENT
Start: 2019-08-14 | End: 2019-08-14

## 2019-08-14 RX ORDER — HYDROMORPHONE HYDROCHLORIDE 1 MG/ML
INJECTION, SOLUTION INTRAMUSCULAR; INTRAVENOUS; SUBCUTANEOUS
Status: COMPLETED
Start: 2019-08-14 | End: 2019-08-14

## 2019-08-14 RX ORDER — MIDAZOLAM HYDROCHLORIDE 1 MG/ML
INJECTION, SOLUTION INTRAMUSCULAR; INTRAVENOUS
Status: DISCONTINUED | OUTPATIENT
Start: 2019-08-14 | End: 2019-08-14

## 2019-08-14 RX ORDER — HYDROCODONE BITARTRATE AND ACETAMINOPHEN 5; 325 MG/1; MG/1
1 TABLET ORAL EVERY 6 HOURS PRN
Status: DISCONTINUED | OUTPATIENT
Start: 2019-08-14 | End: 2019-08-14 | Stop reason: HOSPADM

## 2019-08-14 RX ORDER — ONDANSETRON 2 MG/ML
INJECTION INTRAMUSCULAR; INTRAVENOUS
Status: DISCONTINUED | OUTPATIENT
Start: 2019-08-14 | End: 2019-08-14

## 2019-08-14 RX ORDER — ACETAMINOPHEN 325 MG/1
650 TABLET ORAL ONCE
Status: DISCONTINUED | OUTPATIENT
Start: 2019-08-14 | End: 2019-08-14 | Stop reason: HOSPADM

## 2019-08-14 RX ORDER — VANCOMYCIN HCL IN 5 % DEXTROSE 1G/250ML
1000 PLASTIC BAG, INJECTION (ML) INTRAVENOUS
Status: COMPLETED | OUTPATIENT
Start: 2019-08-14 | End: 2019-08-14

## 2019-08-14 RX ORDER — FENTANYL CITRATE 50 UG/ML
INJECTION, SOLUTION INTRAMUSCULAR; INTRAVENOUS
Status: DISCONTINUED | OUTPATIENT
Start: 2019-08-14 | End: 2019-08-14

## 2019-08-14 RX ORDER — HYDROCODONE BITARTRATE AND ACETAMINOPHEN 5; 325 MG/1; MG/1
1 TABLET ORAL EVERY 6 HOURS PRN
Qty: 11 TABLET | Refills: 0 | Status: SHIPPED | OUTPATIENT
Start: 2019-08-14 | End: 2019-08-24

## 2019-08-14 RX ORDER — LIDOCAINE HCL/PF 100 MG/5ML
SYRINGE (ML) INTRAVENOUS
Status: DISCONTINUED | OUTPATIENT
Start: 2019-08-14 | End: 2019-08-14

## 2019-08-14 RX ORDER — KETAMINE HCL IN 0.9 % NACL 50 MG/5 ML
SYRINGE (ML) INTRAVENOUS
Status: DISCONTINUED | OUTPATIENT
Start: 2019-08-14 | End: 2019-08-14

## 2019-08-14 RX ORDER — PROPOFOL 10 MG/ML
VIAL (ML) INTRAVENOUS CONTINUOUS PRN
Status: DISCONTINUED | OUTPATIENT
Start: 2019-08-14 | End: 2019-08-14

## 2019-08-14 RX ORDER — LIDOCAINE HYDROCHLORIDE AND EPINEPHRINE 10; 10 MG/ML; UG/ML
INJECTION, SOLUTION INFILTRATION; PERINEURAL
Status: DISCONTINUED | OUTPATIENT
Start: 2019-08-14 | End: 2019-08-14 | Stop reason: HOSPADM

## 2019-08-14 RX ORDER — BACITRACIN 50000 [IU]/1
INJECTION, POWDER, FOR SOLUTION INTRAMUSCULAR
Status: DISCONTINUED | OUTPATIENT
Start: 2019-08-14 | End: 2019-08-14 | Stop reason: HOSPADM

## 2019-08-14 RX ORDER — PROPOFOL 10 MG/ML
VIAL (ML) INTRAVENOUS
Status: DISCONTINUED | OUTPATIENT
Start: 2019-08-14 | End: 2019-08-14

## 2019-08-14 RX ADMIN — HYDROMORPHONE HYDROCHLORIDE 0.2 MG: 1 INJECTION, SOLUTION INTRAMUSCULAR; INTRAVENOUS; SUBCUTANEOUS at 05:08

## 2019-08-14 RX ADMIN — FENTANYL CITRATE 25 MCG: 50 INJECTION, SOLUTION INTRAMUSCULAR; INTRAVENOUS at 03:08

## 2019-08-14 RX ADMIN — MIDAZOLAM HYDROCHLORIDE 2 MG: 1 INJECTION, SOLUTION INTRAMUSCULAR; INTRAVENOUS at 02:08

## 2019-08-14 RX ADMIN — PROPOFOL 80 MCG/KG/MIN: 10 INJECTION, EMULSION INTRAVENOUS at 02:08

## 2019-08-14 RX ADMIN — GENTAMICIN SULFATE 240 MG: 40 INJECTION, SOLUTION INTRAMUSCULAR; INTRAVENOUS at 01:08

## 2019-08-14 RX ADMIN — FENTANYL CITRATE 25 MCG: 50 INJECTION INTRAMUSCULAR; INTRAVENOUS at 04:08

## 2019-08-14 RX ADMIN — SODIUM CHLORIDE, SODIUM GLUCONATE, SODIUM ACETATE, POTASSIUM CHLORIDE, MAGNESIUM CHLORIDE, SODIUM PHOSPHATE, DIBASIC, AND POTASSIUM PHOSPHATE: .53; .5; .37; .037; .03; .012; .00082 INJECTION, SOLUTION INTRAVENOUS at 02:08

## 2019-08-14 RX ADMIN — FENTANYL CITRATE 25 MCG: 50 INJECTION INTRAMUSCULAR; INTRAVENOUS at 03:08

## 2019-08-14 RX ADMIN — Medication 10 MG: at 02:08

## 2019-08-14 RX ADMIN — PROPOFOL 20 MG: 10 INJECTION, EMULSION INTRAVENOUS at 02:08

## 2019-08-14 RX ADMIN — LIDOCAINE HYDROCHLORIDE 60 MG: 20 INJECTION, SOLUTION INTRAVENOUS at 02:08

## 2019-08-14 RX ADMIN — HYDROCODONE BITARTRATE AND ACETAMINOPHEN 1 TABLET: 5; 325 TABLET ORAL at 03:08

## 2019-08-14 RX ADMIN — Medication 20 MG: at 03:08

## 2019-08-14 RX ADMIN — SODIUM CHLORIDE: 0.9 INJECTION, SOLUTION INTRAVENOUS at 02:08

## 2019-08-14 RX ADMIN — Medication 250 MG: at 03:08

## 2019-08-14 RX ADMIN — ONDANSETRON 4 MG: 2 INJECTION INTRAMUSCULAR; INTRAVENOUS at 03:08

## 2019-08-14 NOTE — DISCHARGE SUMMARY
[facility]  Discharge Note  Short Stay    Admit Date: 8/14/2019    Discharge Date and Time: 08/14/2019 3:36 PM      Attending Physician: Donny Calle MD     Discharge Provider: Aubrey Clark    Diagnoses:  Active Hospital Problems    Diagnosis  POA    *Overactive bladder [N32.81]  Yes    Interstitial cystitis [N30.10]  Yes    IC (interstitial cystitis) [N30.10]  Yes    Bladder pain [R39.89]  Yes      Resolved Hospital Problems   No resolved problems to display.       Discharged Condition: good    Hospital Course: Patient was admitted for interstim stage II and tolerated the procedure well with no complications. The patient was discharged home in good condition on the same day.       Final Diagnoses: Same as principal problem.    Disposition: Home or Self Care    Follow up/Patient Instructions:    Medications:  Reconciled Home Medications:   Current Discharge Medication List      START taking these medications    Details   amoxicillin-clavulanate 500-125mg (AUGMENTIN) 500-125 mg Tab Take 1 tablet (500 mg total) by mouth 2 (two) times daily. for 5 days  Qty: 6 tablet, Refills: 0      HYDROcodone-acetaminophen (NORCO) 5-325 mg per tablet Take 1 tablet by mouth every 6 (six) hours as needed for Pain.  Qty: 11 tablet, Refills: 0         CONTINUE these medications which have NOT CHANGED    Details   amitriptyline (ELAVIL) 25 MG tablet Take 1 tablet (25 mg total) by mouth nightly as needed for Insomnia. One by mouth every evening at bedtime to help with bladder pain and sleeping  Qty: 30 tablet, Refills: 11      calcium glycerophosphate (PRELIEF ORAL) Take 2 mg by mouth once daily.      cyanocobalamin (VITAMIN B-12) 1000 MCG tablet Take 100 mcg by mouth every 12 (twelve) hours.      diazePAM (VALIUM) 5 MG tablet Take 1 tablet (5 mg total) by mouth 3 (three) times daily as needed (pelvic floor dysfunction).  Qty: 60 tablet, Refills: 0    Associated Diagnoses: Pelvic pain in female      estradiol (ESTRACE) 2  MG tablet       gabapentin (NEURONTIN) 300 MG capsule Take 1 capsule (300 mg total) by mouth 3 (three) times daily.  Qty: 90 capsule, Refills: 11    Associated Diagnoses: Pelvic pain in female      ibuprofen (ADVIL,MOTRIN) 200 MG tablet Take 200 mg by mouth every 6 (six) hours as needed for Pain.      Lactobacillus rhamnosus GG (CULTURELLE) 10 billion cell capsule Take 1 capsule by mouth once daily.      LORazepam (ATIVAN) 1 MG tablet Take 1 tablet (1 mg total) by mouth every 6 (six) hours as needed for Anxiety.  Qty: 30 tablet, Refills: 0    Associated Diagnoses: Pelvic pain      methen-m.blue-s.phos-phsal-hyo (URIBEL) 118-10-40.8-36 mg Cap Take 1 capsule by mouth once daily.  Qty: 60 capsule, Refills: 0      tamsulosin (FLOMAX) 0.4 mg Cap Take 1 capsule (0.4 mg total) by mouth every evening.  Qty: 30 capsule, Refills: 11    Associated Diagnoses: OAB (overactive bladder); Pelvic pain      venlafaxine (EFFEXOR-XR) 75 MG 24 hr capsule       omega-3 acid ethyl esters (LOVAZA) 1 gram capsule Take 1 g by mouth once daily.           Discharge Procedure Orders   Call MD for:  persistent nausea and vomiting or diarrhea     Call MD for:  severe uncontrolled pain     Call MD for:  persistent dizziness, light-headedness, or visual disturbances     Call MD for:   Order Comments: Temperature > 101F     Follow-up Information     Donny Calle MD In 3 months.    Specialty:  Urology  Why:  post op interstim stage II  Contact information:  [facility]  [facility] [id number] [phone]                   Discharge Procedure Orders (must include Diet, Follow-up, Activity):   Discharge Procedure Orders (must include Diet, Follow-up, Activity)   Call MD for:  persistent nausea and vomiting or diarrhea     Call MD for:  severe uncontrolled pain     Call MD for:  persistent dizziness, light-headedness, or visual disturbances     Call MD for:   Order Comments: Temperature > 101F

## 2019-08-14 NOTE — DISCHARGE INSTRUCTIONS
PATIENT INSTRUCTIONS  POST-ANESTHESIA    IMMEDIATELY FOLLOWING SURGERY:  Do not drive or operate machinery for the first twenty four hours after surgery.  Do not make any important decisions for twenty four hours after surgery or while taking narcotic pain medications or sedatives.  If you develop intractable nausea and vomiting or a severe headache please notify your doctor immediately.    FOLLOW-UP:  Please make an appointment with your surgeon as instructed. You do not need to follow up with anesthesia unless specifically instructed to do so.    WOUND CARE INSTRUCTIONS (if applicable):  Keep a dry clean dressing on the anesthesia/puncture wound site if there is drainage.  Once the wound has quit draining you may leave it open to air.  Generally you should leave the bandage intact for twenty four hours unless there is drainage.  If the epidural site drains for more than 36-48 hours please call the anesthesia department.    QUESTIONS?:  Please feel free to call your physician or the hospital  if you have any questions, and they will be happy to assist you.         Recovery After Procedural Sedation (Adult)  You have been given medicine by vein to make you sleep during your surgery. This may have included both a pain medicine and sleeping medicine. Most of the effects have worn off. But you may still have some drowsiness for the next 6 to 8 hours.  Home care  Follow these guidelines when you get home:  · For the next 8 hours, you should be watched by a responsible adult. This person should make sure your condition is not getting worse.  · Don't drink any alcohol for the next 24 hours.  · Don't drive, operate dangerous machinery, or make important business or personal decisions during the next 24 hours.  Note: Your healthcare provider may tell you not to take any medicine by mouth for pain or sleep in the next 4 hours. These medicines may react with the medicines you were given in the hospital. This could  cause a much stronger response than usual.  Follow-up care  Follow up with your healthcare provider if you are not alert and back to your usual level of activity within 12 hours.  When to seek medical advice  Call your healthcare provider right away if any of these occur:  · Drowsiness gets worse  · Weakness or dizziness gets worse  · Repeated vomiting  · You can't be awakened   Date Last Reviewed: 10/18/2016  © 8566-3159 [employer]. [address] [place] [address], [place] [address]. All rights reserved. This information is not intended as a substitute for professional medical care. Always follow your healthcare professional's instructions.    [facility] Anesthesia Department  [address] [facility]  [phone]

## 2019-08-14 NOTE — PLAN OF CARE
Pt and family at beside explained discharge instructions. Pt verbalized understanding. Pt states pain is tolerable at this time and denies any nausea. Pt ambulated to the restroom with steady gait. Consent forms from procedure in chart

## 2019-08-14 NOTE — OP NOTE
[facility]  Operative Note    Date: 08/14/2019    Pre-Op Diagnosis: interstitial cystitis, pelvic pain, urinary frequency, urgency, incomplete emptying    Post-Op Diagnosis: same    Procedure(s) Performed:   1.  Insertion of peripheral neurostimulator pulse generator (77614)  2.  Electronic analysis of implanted neurostimulator pulse generator system with intraoperative or subsequent programming (85974)    Specimen(s): none    Staff Surgeon: Donny Calle MD    Assistant Surgeon: Aubrey Clark MD    Anesthesia: General mask inhalational anesthesia    Indications: Chelsea Oconnell is a 38 y.o. female with interstitial cystitis, pelvic pain and bothersome urinary urgency and frequency who has tried and failed conservative therapies. She has had Interstim stage I several weeks ago and her urinary symptoms have greatly improved. She presents today for Interstim stage II.     Findings:   Generator placed on left side    Estimated Blood Loss: minimal    Drains: none    Complications:  None    Procedure in Detail:  After informed consent was obtained, the patient was transferred to the operating suite and placed in the prone position.  Anesthesia was administered.  The patient received preoperative antibiotics.  The patient was then prepped and draped in the usual sterile fashion.  Timeout was performed and preoperative antibiotics were confirmed.      1% lidocaine was injected over the left hip along the previous incision.  This was then opened sharply using a 15 blade.  Bovie electrocautery and blunt dissection were used to open the hip pocket.  The lead which was connected to the percutaneous extension was identified and elevated.  The extension was cut and removed from the field, ensuring sterility.  The subcutaneous pocket anterior to the muscle surface was enlarged and hemostasis was established.      The sutures holding the protective boot were cut and the protective boot was retracted.  The set  screws were exposed and loosened with a hex wrench.  The boot was removed and discarded.  The lead was cleansed of body fluid and dried and then inserted into the header of the neurostimulator until the blue tip was visualized at the distal window.  The single set screw was tightened. A silk suture was tied around the lead.  The neurostimulator was placed into the subcutaneous pocket with the etched identification side placed upwards. The programming head was placed over the implanted neurostimulator in a sterile cover to ensure adequate lead connection and that parameters were within normal limits.  Impedances were confirmed to be within normal limits (>50 and <4,000).      The wound was irrigated with bacitracin solution and then closed in 2 layers, using 3-0 vicryl deep dermal suttres and a running 4-0 monocryl subcuticular superficially.  Steristips, telfa and a tegaderm were applied.      The patient tolerated the procedure well and was transferred to the recovery room in stable condition.      Disposition:  The patient will follow up with Dr. Calle. The patient will meet with the [facility] in the recovery room.      Aubrey Clark MD

## 2019-08-14 NOTE — PROGRESS NOTES
Pt ready for discharge and awaiting for pharmacy to deliver postop prescription. Pt resting in room. Family at bedside. Once prescription arrives pt will be d/c

## 2019-08-14 NOTE — TRANSFER OF CARE
"Anesthesia Transfer of Care Note    Patient: Chelsea Oconnell    Procedure(s) Performed: Procedure(s) (LRB):  INSERTION, PULSE GENERATOR, NEUROSTIMULATOR, SACRAL Stage2 (Left)  PROGRAMMING-STIMULATOR    Patient location: PACU    Anesthesia Type: general    Transport from OR: Transported from OR on 6-10 L/min O2 by face mask with adequate spontaneous ventilation    Post pain: adequate analgesia    Post assessment: no apparent anesthetic complications    Post vital signs: stable    Level of consciousness: awake    Nausea/Vomiting: no nausea/vomiting    Complications: none    Transfer of care protocol was followed      Last vitals:   Visit Vitals  /65 (BP Location: Left arm, Patient Position: Lying)   Pulse 83   Temp 36.7 °C (98 °F) (Temporal)   Resp 20   Ht 5' 2" (1.575 m)   Wt 66.2 kg (146 lb)   SpO2 98%   Breastfeeding? No   BMI 26.70 kg/m²     "

## 2019-08-15 NOTE — ANESTHESIA POSTPROCEDURE EVALUATION
Anesthesia Post Evaluation    Patient: Chelsea Oconnell    Procedure(s) Performed: Procedure(s) (LRB):  INSERTION, PULSE GENERATOR, NEUROSTIMULATOR, SACRAL Stage2 (Left)  PROGRAMMING-STIMULATOR    Final Anesthesia Type: general  Patient location during evaluation: PACU  Patient participation: Yes- Able to Participate  Level of consciousness: awake and alert and oriented  Post-procedure vital signs: reviewed and stable  Pain management: adequate  Airway patency: patent  PONV status at discharge: No PONV  Anesthetic complications: no      Cardiovascular status: hemodynamically stable  Respiratory status: unassisted  Hydration status: euvolemic  Follow-up not needed.          Vitals Value Taken Time   /65 8/14/2019  6:19 PM   Temp 36.2 °C (97.2 °F) 8/14/2019  6:15 PM   Pulse 94 8/14/2019  6:24 PM   Resp 16 8/14/2019  6:15 PM   SpO2 98 % 8/14/2019  6:24 PM   Vitals shown include unvalidated device data.      Event Time     Out of Recovery 16:45:00          Pain/Tatum Score: Pain Rating Prior to Med Admin: 9 (8/14/2019  5:36 PM)  Pain Rating Post Med Admin: 7 (8/14/2019  5:57 PM)  Tatum Score: 10 (8/14/2019  5:57 PM)

## 2019-08-26 ENCOUNTER — TELEPHONE (OUTPATIENT)
Dept: UROLOGY | Facility: CLINIC | Age: 39
End: 2019-08-26

## 2019-08-26 DIAGNOSIS — G89.18 POSTOPERATIVE PAIN AFTER SPINAL SURGERY: Primary | ICD-10-CM

## 2019-08-26 RX ORDER — CIPROFLOXACIN 500 MG/1
500 TABLET ORAL 2 TIMES DAILY
Qty: 14 TABLET | Refills: 0 | Status: SHIPPED | OUTPATIENT
Start: 2019-08-26 | End: 2019-09-02

## 2019-08-26 RX ORDER — TRAMADOL HYDROCHLORIDE 100 MG/1
100 TABLET, EXTENDED RELEASE ORAL 2 TIMES DAILY
Qty: 14 TABLET | Refills: 0 | Status: SHIPPED | OUTPATIENT
Start: 2019-08-26 | End: 2019-09-02

## 2019-08-26 NOTE — TELEPHONE ENCOUNTER
----- Message from Cele Church LPN sent at 8/26/2019  4:33 PM CDT -----  Contact: pt   Pt c/o constant pain at interstim site, no swelling, no redness, not warm to touch. States it hurts to walk  ----- Message -----  From: Kateryna Patterson  Sent: 8/26/2019  11:56 AM  To: [facility] Staff    Type:  Needs Medical Advice    Who Called: pt   Symptoms (please be specific): pain where the implant is. She stated she still can't walk without it hurting.   How long has patient had these symptoms:  8/14/19    Would the patient rather a call back or a response via [facility]? Call  Best Call Back Number: [phone]  Additional Information: please call pt

## 2019-08-26 NOTE — TELEPHONE ENCOUNTER
Postoperative pain after spinal surgery  -     traMADol (ULTRAM-ER) 100 MG Tb24; Take 1 tablet (100 mg total) by mouth 2 (two) times daily. for 7 days  Dispense: 14 tablet; Refill: 0  -     ciprofloxacin HCl (CIPRO) 500 MG tablet; Take 1 tablet (500 mg total) by mouth 2 (two) times daily. for 14 doses  Dispense: 14 tablet; Refill: 0

## 2019-10-10 ENCOUNTER — TELEPHONE (OUTPATIENT)
Dept: UROLOGY | Facility: CLINIC | Age: 39
End: 2019-10-10

## 2019-10-10 NOTE — TELEPHONE ENCOUNTER
Tried twice, call can not be competed as dialed. Unable to leave message. Pt can either come see  or call the 1800 to [employer] and have them help her over the phone to reprogram her settings

## 2019-10-10 NOTE — TELEPHONE ENCOUNTER
----- Message from Kathrine Oconnell sent at 10/10/2019  9:21 AM CDT -----  Contact: pt [phone]  Pt over month ago inner stem and 2 weeks ago had pneuma.  Pt right foot has been lock up and toes are curled.

## 2020-06-02 ENCOUNTER — OFFICE VISIT (OUTPATIENT)
Dept: UROLOGY | Facility: CLINIC | Age: 40
End: 2020-06-02
Payer: COMMERCIAL

## 2020-06-02 VITALS
WEIGHT: 154.31 LBS | SYSTOLIC BLOOD PRESSURE: 107 MMHG | DIASTOLIC BLOOD PRESSURE: 77 MMHG | HEART RATE: 98 BPM | BODY MASS INDEX: 28.39 KG/M2 | HEIGHT: 62 IN

## 2020-06-02 DIAGNOSIS — R10.2 PELVIC PAIN: ICD-10-CM

## 2020-06-02 DIAGNOSIS — M79.7 FIBROMYALGIA: ICD-10-CM

## 2020-06-02 DIAGNOSIS — R10.2 PELVIC PAIN IN FEMALE: ICD-10-CM

## 2020-06-02 DIAGNOSIS — R39.89 BLADDER PAIN: ICD-10-CM

## 2020-06-02 DIAGNOSIS — N32.81 OAB (OVERACTIVE BLADDER): ICD-10-CM

## 2020-06-02 DIAGNOSIS — N30.10 IC (INTERSTITIAL CYSTITIS): ICD-10-CM

## 2020-06-02 DIAGNOSIS — R33.9 INCOMPLETE EMPTYING OF BLADDER: Primary | ICD-10-CM

## 2020-06-02 PROCEDURE — 99999 PR PBB SHADOW E&M-EST. PATIENT-LVL III: CPT | Mod: PBBFAC,,, | Performed by: UROLOGY

## 2020-06-02 PROCEDURE — 3008F PR BODY MASS INDEX (BMI) DOCUMENTED: ICD-10-PCS | Mod: CPTII,S$GLB,, | Performed by: UROLOGY

## 2020-06-02 PROCEDURE — 51700 PR IRRIGATION, BLADDER: ICD-10-PCS | Mod: 51,S$GLB,, | Performed by: UROLOGY

## 2020-06-02 PROCEDURE — 3008F BODY MASS INDEX DOCD: CPT | Mod: CPTII,S$GLB,, | Performed by: UROLOGY

## 2020-06-02 PROCEDURE — 99215 OFFICE O/P EST HI 40 MIN: CPT | Mod: 25,S$GLB,, | Performed by: UROLOGY

## 2020-06-02 PROCEDURE — 95971 PR ANALYZE NEUROSTIM,SIMPLE/PROG: ICD-10-PCS | Mod: S$GLB,,, | Performed by: UROLOGY

## 2020-06-02 PROCEDURE — 99999 PR PBB SHADOW E&M-EST. PATIENT-LVL III: ICD-10-PCS | Mod: PBBFAC,,, | Performed by: UROLOGY

## 2020-06-02 PROCEDURE — 95971 ALYS SMPL SP/PN NPGT W/PRGRM: CPT | Mod: S$GLB,,, | Performed by: UROLOGY

## 2020-06-02 PROCEDURE — 51700 IRRIGATION OF BLADDER: CPT | Mod: 51,S$GLB,, | Performed by: UROLOGY

## 2020-06-02 PROCEDURE — 87086 URINE CULTURE/COLONY COUNT: CPT

## 2020-06-02 PROCEDURE — 99215 PR OFFICE/OUTPT VISIT, EST, LEVL V, 40-54 MIN: ICD-10-PCS | Mod: 25,S$GLB,, | Performed by: UROLOGY

## 2020-06-02 RX ORDER — LIDOCAINE HYDROCHLORIDE 10 MG/ML
1 INJECTION INFILTRATION; PERINEURAL
Status: COMPLETED | OUTPATIENT
Start: 2020-06-02 | End: 2020-06-02

## 2020-06-02 RX ORDER — LIDOCAINE HYDROCHLORIDE 10 MG/ML
10 INJECTION, SOLUTION EPIDURAL; INFILTRATION; INTRACAUDAL; PERINEURAL ONCE
Qty: 30 ML | Refills: 6 | Status: SHIPPED | OUTPATIENT
Start: 2020-06-02 | End: 2020-06-02

## 2020-06-02 RX ORDER — HEPARIN SODIUM 10000 [USP'U]/ML
20000 INJECTION, SOLUTION INTRAVENOUS; SUBCUTANEOUS ONCE
Qty: 2 ML | Refills: 6 | Status: SHIPPED | OUTPATIENT
Start: 2020-06-02 | End: 2020-06-02

## 2020-06-02 RX ORDER — BUPIVACAINE HYDROCHLORIDE 5 MG/ML
50 INJECTION, SOLUTION PERINEURAL ONCE
Status: COMPLETED | OUTPATIENT
Start: 2020-06-02 | End: 2020-06-02

## 2020-06-02 RX ORDER — BUPIVACAINE HYDROCHLORIDE 5 MG/ML
30 INJECTION, SOLUTION EPIDURAL; INTRACAUDAL ONCE
Qty: 30 ML | Refills: 6 | Status: SHIPPED | OUTPATIENT
Start: 2020-06-02 | End: 2020-06-02

## 2020-06-02 RX ORDER — AMITRIPTYLINE HYDROCHLORIDE 25 MG/1
25 TABLET, FILM COATED ORAL NIGHTLY PRN
Qty: 30 TABLET | Refills: 11 | Status: SHIPPED | OUTPATIENT
Start: 2020-06-02 | End: 2020-10-13 | Stop reason: SDUPTHER

## 2020-06-02 RX ORDER — AZITHROMYCIN 250 MG/1
TABLET, FILM COATED ORAL
COMMUNITY
Start: 2020-03-26 | End: 2022-03-15

## 2020-06-02 RX ORDER — HYDROXYZINE HYDROCHLORIDE 25 MG/1
25 TABLET, FILM COATED ORAL NIGHTLY
Qty: 30 TABLET | Refills: 12 | Status: SHIPPED | OUTPATIENT
Start: 2020-06-02 | End: 2020-07-02

## 2020-06-02 RX ORDER — TAMSULOSIN HYDROCHLORIDE 0.4 MG/1
0.4 CAPSULE ORAL NIGHTLY
Qty: 30 CAPSULE | Refills: 11 | Status: SHIPPED | OUTPATIENT
Start: 2020-06-02 | End: 2020-10-13 | Stop reason: SDUPTHER

## 2020-06-02 RX ORDER — INDOMETHACIN 25 MG/1
50 CAPSULE ORAL
Status: COMPLETED | OUTPATIENT
Start: 2020-06-02 | End: 2020-06-02

## 2020-06-02 RX ORDER — HEPARIN SODIUM 10000 [USP'U]/ML
20000 INJECTION, SOLUTION INTRAVENOUS; SUBCUTANEOUS
Status: COMPLETED | OUTPATIENT
Start: 2020-06-02 | End: 2020-06-02

## 2020-06-02 RX ADMIN — BUPIVACAINE HYDROCHLORIDE 250 MG: 5 INJECTION, SOLUTION PERINEURAL at 03:06

## 2020-06-02 RX ADMIN — HEPARIN SODIUM 20000 UNITS: 10000 INJECTION, SOLUTION INTRAVENOUS; SUBCUTANEOUS at 03:06

## 2020-06-02 RX ADMIN — INDOMETHACIN 50 MEQ: 25 CAPSULE ORAL at 03:06

## 2020-06-02 RX ADMIN — LIDOCAINE HYDROCHLORIDE 1 ML: 10 INJECTION INFILTRATION; PERINEURAL at 03:06

## 2020-06-02 NOTE — PROGRESS NOTES
CC: refractory IC ( bladder pain), want to get her bladder removed!    Chelsea Oconnell is a 39 y.o. woman who is here for the evaluation of No chief complaint on file.  s/p InterStim stage I for Incomplete bladder emptying and OAB on 7/31/19.  She reports that InterStim test stimulation improved her voiding function as following:  Day time frequency every 2 hours to only 5 x a day.  Nocturia 3 x a night to 1 x a night.  Much better voiding and easier urination.  Even her pelvic pain is much better.  Reports at least 80 % improvement in overall her urinary symptoms.    Initially saw her on 5/7/19 by her urologist in [place], [place] for refractory IC.  She lives in [place], [place] and has seen many urologists over the years.  She presents with at least 5 year hx of refractory bladder and pelvic pain with frequency, urgency, difficulty of emptying bladder.  Underwent many treatment for her urinary problems but with minimal improvement.  C/o constant pain in the bladder and pelvic area.  She wishes to get her bladder removed but her urologist in [place] did not do such surgery and refer her to me.  Had botox injection to the bladder but developed urinary retention and has done CIC for 1 year.  Now she is able to urinate but only small volume at a time with straining.    Urination symptoms prior to InterStim Therapy: Positive for frequency, urgency, nocturia and incomplete emptying, straining.  Denies flank pain     Had 5 c-sections, 4 to 5 Gyn surgeries including hysterectomy ( no malignancy).  S/p removal of liver hemangioma.    So underwent FUDS and cysto under anesthesia on 6/5/19 by me.    Procedure Date:  06/05/2019  Pre-OP Diagnosis:   Difficulty in voiding, frequency and urgency, intermittency, incomplete bladder emptying, urethral / bladder pain  Post-OP Diagnosis:   Same, pelvic floor dysfunction  Procedure:   Complex Cystometrogram   Voiding / Pressure Study with Intrarectal Balloon   Complex Uroflow    Electromyogram of Anal Sphincter.   Fluoroscopy less than 1 hour  Findings:   Initial Uroflow study: voided 20 ml, PVR 0 ml, Qmax 2 ml/sec  --- Bladder ---   CYSTOMETROGRAM ( Filling Phase ):   Cystometric Numeric Data:   - First Desire (Sensation): 9 mL at 1 cm of water pressure.   - Normal Desire: 18 mL at 1 cm of water.   - Strong Desire: 31 mL at 1 cm of water.   - Urgency (Imminent Void) : 48 mL at 19 cm of water.   - Maximum Cystometric Capacity: 85 mL at 25 cm of water pressure.   Compliance:   - low.   Leak Point Pressure:   - Valsalva ( Abdominal ) Leak Point Pressure: none.   UROFLOW:   - Voided Volume: unable to void, catheter removed but still unable to void.  At the end, she uses a bathroom, urinated 100 mL.   - Residual Urine: more than 100 mL.   - Maximum Flow Rate: n/a mL/sec.   - Flow Pattern: n/a but suspect an intermittent, abdominal straining.   VOIDING PRESSURE STUDY ( Voiding Phase ):   Detrusor Pressure:   - Maximum Detrusor Pressure: na.   - Detrusor Pressure at Maximum Flow: na.   - Detrusor Contraction Characteristics: na .   ELECTROMYOGRAM:   - unable to relax at the time of voiding  CONCLUSIONS:   1. Pelvic floor dysfunction  2. Chronic pelvic pain  3. Incomplete bladder emptying     Proceed with cysto under anesthesia with hydrodistention, urethral dilation.  Consider Physical Therapy, flomax and valium, possible InterStim Therapy    Procedure(s) Performed: 6/5/19  1.  Cystoscopy with hydrodistension  2.  Urethral dilation  3.  Bladder instillation  Findings:   - 1L anesthetic bladder capacity  - Diffuse bladder erythema, minimal glomerulations, no Hunner lesions  - Urethra dilated to 32 Fr with urethral sounds    Since her cystoscopic surgery, she is able to void somewhat better on flomax.  Was unable to undergo physical therapy due to her insurance coverage.  Vaginal suppository somewhat helpful, but is not able to use valium into the vagina due to recurrent yeast infection.  She has  taken valium orally at night which has helped her.  Has been taking ibuprofen 200 mg 3 to 4 pills at a time 2 to 3 x a day for chronic pelvic pain.    C/o constant pelvic and urethral pain regardless whether her bladder is full or not.  She reports that she does not feel emptying her bladder completely.  She voids at least every 1 hour.  Has been on Uribel at least every other day due to her chronic bladder pain.      Past Medical History:   Diagnosis Date    Crohn's colitis     Lupus (systemic lupus erythematosus)      Past Surgical History:   Procedure Laterality Date    CYSTOSCOPY WITH HYDRODISTENSION AND BIOPSY OF BLADDER N/A 6/5/2019    Procedure: CYSTOSCOPY, WITH BLADDER HYDRODISTENSION AND BIOPSY;  Surgeon: Donny Calle MD;  Location: [facility] OR [address] [place] [address];  Service: Urology;  Laterality: N/A;  1 hour    DILATION OF URETHRA N/A 6/5/2019    Procedure: DILATION, URETHRA;  Surgeon: Donny Calle MD;  Location: [facility] OR [address] [place] [address];  Service: Urology;  Laterality: N/A;    FLUOROSCOPIC URODYNAMIC STUDY N/A 6/5/2019    Procedure: URODYNAMIC STUDY, FLUOROSCOPIC;  Surgeon: Donny Calle MD;  Location: [facility] OR [address] [place] [address];  Service: Urology;  Laterality: N/A;  1 hour    FLUOROSCOPY N/A 7/31/2019    Procedure: FLUOROSCOPY;  Surgeon: Donny Calle MD;  [facility];  Service: Urology;  Laterality: N/A;    HYSTERECTOMY      INSERTION OF SACRAL NEUROSTIMULATOR GENERATOR Left 8/14/2019    Procedure: INSERTION, PULSE GENERATOR, NEUROSTIMULATOR, SACRAL Stage2;  Surgeon: Donny Calle MD;  Location: [facility] OR [address] [place] [address];  Service: Urology;  Laterality: Left;    INSERTION OF SACRAL NEUROSTIMULATOR, ELECTRODES, AND IMPLANTABLE PULSE GENERATOR (IPG) Right 7/31/2019    Procedure: INSERTION, ELECTRODE LEAD, NEUROSTIMULATOR, SACRAL;  Surgeon: Donny Calle MD;  Location: [facility] OR [address] [place] [address];  Service: Urology;  Laterality: Right;    INSTILLATION OF URINARY BLADDER N/A 6/5/2019    Procedure: INSTILLATION, BLADDER;  Surgeon:  Donny Calle MD;  Location: [facility] OR [address] [place] [address];  Service: Urology;  Laterality: N/A;    liver      hemangioma removed     Social History     Tobacco Use    Smoking status: Never Smoker    Smokeless tobacco: Never Used   Substance Use Topics    Alcohol use: Never     Frequency: Never    Drug use: Never     History reviewed. No pertinent family history.  Allergy:  Review of patient's allergies indicates:   Allergen Reactions    Adhesive Hives     Had reaction to surgical tape after last procedure    Iodine and iodide containing products Rash and Swelling    Shellfish containing products Rash and Swelling    Sulfa (sulfonamide antibiotics) Rash and Swelling    Xanax [alprazolam] Hives    Sertraline Hives     Outpatient Encounter Medications as of 6/2/2020   Medication Sig Dispense Refill    amitriptyline (ELAVIL) 25 MG tablet Take 1 tablet (25 mg total) by mouth nightly as needed for Insomnia. One by mouth every evening at bedtime to help with bladder pain and sleeping 30 tablet 11    azithromycin (Z-NOHEMI) 250 MG tablet       estradiol (ESTRACE) 2 MG tablet       gabapentin (NEURONTIN) 300 MG capsule Take 1 capsule (300 mg total) by mouth 3 (three) times daily. 90 capsule 11    ibuprofen (ADVIL,MOTRIN) 200 MG tablet Take 200 mg by mouth every 6 (six) hours as needed for Pain.      methen-m.blue-s.phos-phsal-hyo (URIBEL) 118-10-40.8-36 mg Cap Take 1 capsule by mouth once daily. 60 capsule 0    multivitamin-Ca-iron-minerals 27-0.4 mg Tab Take by mouth.      omega-3 acid ethyl esters (LOVAZA) 1 gram capsule Take 1 g by mouth once daily.      [DISCONTINUED] amitriptyline (ELAVIL) 25 MG tablet Take 1 tablet (25 mg total) by mouth nightly as needed for Insomnia. One by mouth every evening at bedtime to help with bladder pain and sleeping 30 tablet 11    [DISCONTINUED] methen-m.blue-s.phos-phsal-hyo (URIBEL) 118-10-40.8-36 mg Cap Take 1 capsule by mouth once daily. 60 capsule 0    bupivacaine, PF, 0.5%, 5  mg/mL, (MARCAINE, PF,) 0.5 % (5 mg/mL) Soln Inject 30 mLs (150 mg total) into the skin once. Intravesical instillation for bladder pain for 1 dose 30 mL 6    calcium glycerophosphate (PRELIEF ORAL) Take 2 mg by mouth once daily.      cyanocobalamin (VITAMIN B-12) 1000 MCG tablet Take 100 mcg by mouth every 12 (twelve) hours.      diazePAM (VALIUM) 5 MG tablet Take 1 tablet (5 mg total) by mouth 3 (three) times daily as needed (pelvic floor dysfunction). 60 tablet 0    heparin sodium,porcine (HEPARIN, PORCINE,) 10,000 unit/mL injection 2 mLs (20,000 Units total) by Intravesical route once. for 1 dose 2 mL 6    hydrOXYzine HCL (ATARAX) 25 MG tablet Take 1 tablet (25 mg total) by mouth nightly. 30 tablet 12    Lactobacillus rhamnosus GG (CULTURELLE) 10 billion cell capsule Take 1 capsule by mouth once daily.      lidocaine, PF, 10 mg/ml, 1%, 10 mg/mL (1 %) Soln 10 mLs (100 mg total) by Other route once. for 1 dose 30 mL 6    LORazepam (ATIVAN) 1 MG tablet Take 1 tablet (1 mg total) by mouth every 6 (six) hours as needed for Anxiety. 30 tablet 0    pentosan polysulfate (ELMIRON) 100 mg Cap Take 2 capsules (200 mg total) by mouth 2 (two) times daily before meals. 120 capsule 12    sodium bicarbonate 4% (NEUT) 4 % injection Instill 5 mLs (2.5 mEq total) into the bladder once. for 1 dose 5 mL 6    tamsulosin (FLOMAX) 0.4 mg Cap Take 1 capsule (0.4 mg total) by mouth every evening. 30 capsule 11    venlafaxine (EFFEXOR-XR) 75 MG 24 hr capsule       [DISCONTINUED] tamsulosin (FLOMAX) 0.4 mg Cap Take 1 capsule (0.4 mg total) by mouth every evening. (Patient not taking: Reported on 6/2/2020) 30 capsule 11     Facility-Administered Encounter Medications as of 6/2/2020   Medication Dose Route Frequency Provider Last Rate Last Dose    bupivacaine injection 250 mg  50 mL Intrapleural Once Donny Calle MD        heparin (porcine) injection 20,000 Units  20,000 Units Subcutaneous 1 time in Clinic/HOD Donny Calle,  MD        lidocaine HCL 10 mg/ml (1%) injection 1 mL  1 mL Other 1 time in Clinic/HOD Donny Calle MD        sodium bicarbonate solution 50 mEq  50 mEq Intravenous 1 time in Clinic/HOD Donny Calle MD         Review of Systems   ROS  Physical Exam     Vitals:    06/02/20 1433   BP: 107/77   Pulse: 98     Physical Exam   Constitutional: She is oriented to person, place, and time. She appears well-developed and well-nourished. No distress.   HENT:   Head: Normocephalic and atraumatic.   Right Ear: External ear normal.   Left Ear: External ear normal.   Nose: Nose normal.   Eyes: Conjunctivae and EOM are normal. Pupils are equal, round, and reactive to light. No scleral icterus.   Neck: Normal range of motion. Neck supple. No JVD present. No tracheal deviation present. No thyromegaly present.   Cardiovascular: Normal rate, regular rhythm, normal heart sounds and intact distal pulses.  Exam reveals no gallop and no friction rub.    No murmur heard.  Pulmonary/Chest: Effort normal and breath sounds normal. No respiratory distress. She has no wheezes.   Abdominal: Soft. Bowel sounds are normal. She exhibits no distension and no mass. There is no tenderness. There is no rebound and no guarding.   Genitourinary: Vagina normal and uterus normal. No vaginal discharge found.   Genitourinary Comments: No tenderness at the urethra.  Positive tenderness at the kerrie-urethral area on the both sides.  Positive tenderness on palpation each side of vaginal wall.  No tenderness on palpation at the base of the bladder.    Difficulty in localizing the levator ani on YEE of rectum.  Slight tenderness noted on the levator ani.   Musculoskeletal: Normal range of motion. She exhibits no edema, tenderness or deformity.   Lymphadenopathy:     She has no cervical adenopathy.   Neurological: She is alert and oriented to person, place, and time.   Skin: Skin is warm and dry. She is not diaphoretic.     Psychiatric: She has a normal mood and  affect. Her behavior is normal. Thought content normal.     Wound: clean dry and intact.  Extension cable is coming out from the right upper hip area.  Electrode and future generator site at the left upper hip area is well healed.    LABS:  Lab Results   Component Value Date    CREATININE 0.8 05/07/2019     Urine Culture, Routine   Date Value Ref Range Status   05/07/2019 No growth  Final     Procedure  InterStim Neurostimulator Reprogrammin Procedure:    Using the , different setting were tried until patient felt the appropriate responses.  3.1 volt on Program 6 vaginal stimulation.  2.2 volts on Program 7 vaginal stimulation  1.8 volts on Program 5 vaginal stimulation.    Recommend to change the setting to program 5.  Patient experienced the better stimulation as a result of reprogramming and verbalized the familiarity of using an own controller.    Bladder Instillation Procedure:  A 16 F Gomez catheter was placed and the bladder was drained without problems.    Intravesical cocktail mixture treatment is given in a standard fashion.  The solution of 20,000 unit heparin, 50 ml 0.5 % Marcaine, 10 ml 1% lidocaine, and 10 ml 8.4% sodium bicarbonate was instilled in the bladder, and the catheter was removed. The patient was instructed to hold the mixture solution in the bladder for 20 to 30 minutes and to void as instructed.    Patient tolerated the procedure well.    Assessment and Plan:  Diagnoses and all orders for this visit:    Incomplete emptying of bladder    IC (interstitial cystitis)  -     amitriptyline (ELAVIL) 25 MG tablet; Take 1 tablet (25 mg total) by mouth nightly as needed for Insomnia. One by mouth every evening at bedtime to help with bladder pain and sleeping  -     hydrOXYzine HCL (ATARAX) 25 MG tablet; Take 1 tablet (25 mg total) by mouth nightly.  -     pentosan polysulfate (ELMIRON) 100 mg Cap; Take 2 capsules (200 mg total) by mouth 2 (two) times daily before meals.  -      methen-m.blue-s.phos-phsal-hyo (URIBEL) 118-10-40.8-36 mg Cap; Take 1 capsule by mouth once daily.  -     Urine culture  -     heparin sodium,porcine (HEPARIN, PORCINE,) 10,000 unit/mL injection; 2 mLs (20,000 Units total) by Intravesical route once. for 1 dose  -     bupivacaine, PF, 0.5%, 5 mg/mL, (MARCAINE, PF,) 0.5 % (5 mg/mL) Soln; Inject 30 mLs (150 mg total) into the skin once. Intravesical instillation for bladder pain for 1 dose  -     lidocaine, PF, 10 mg/ml, 1%, 10 mg/mL (1 %) Soln; 10 mLs (100 mg total) by Other route once. for 1 dose  -     sodium bicarbonate 4% (NEUT) 4 % injection; Instill 5 mLs (2.5 mEq total) into the bladder once. for 1 dose    Fibromyalgia    OAB (overactive bladder)  -     tamsulosin (FLOMAX) 0.4 mg Cap; Take 1 capsule (0.4 mg total) by mouth every evening.    Pelvic pain  -     tamsulosin (FLOMAX) 0.4 mg Cap; Take 1 capsule (0.4 mg total) by mouth every evening.    Pelvic pain in female    Bladder pain  -     heparin (porcine) injection 20,000 Units  -     sodium bicarbonate solution 50 mEq  -     bupivacaine injection 250 mg  -     lidocaine HCL 10 mg/ml (1%) injection 1 mL    successful response from InterStim therapy.  She is able to void on her own.  Bladder pain is much better controlled but still experiences recurrent flare-ups.  IC smart diet brochure given.  Start Elmiron in addition to other IC meds.  She would like to try bladder instillation at home ( 3 hours away from [facility]).  My nurse Cele instructed her how to to it.  I spent 40 minutes with the patient of which more than half was spent in direct consultation with the patient in regards to our treatment and plan.    Follow-up:  Follow up in about 6 months (around 12/2/2020).

## 2020-06-04 LAB — BACTERIA UR CULT: NO GROWTH

## 2020-06-04 NOTE — TELEPHONE ENCOUNTER
Pt notified , there is no substitute for elmiron . She may omit that rx and stay on the other IC meds.

## 2020-06-04 NOTE — TELEPHONE ENCOUNTER
----- Message from Donny Calle MD sent at 6/4/2020 10:51 AM CDT -----  Please ask the pt to find out from her pharmacist what is covered under her plan, that is similar to Uribel.  Otherwise, I can give Pyridium or Azo.  ----- Message -----  From: Cele Church LPN  Sent: 6/4/2020  10:28 AM CDT  To: Donny Calle MD    uribel not covered , can you send another rx?

## 2020-06-04 NOTE — TELEPHONE ENCOUNTER
Pt called stated that her insurance will cover Uro-MP capsule to replace Uribel.  And the pharmacy told her that Elmiron is $180 a month, so she want to know if you can replace this as well.  Call back [phone]

## 2020-06-18 ENCOUNTER — TELEPHONE (OUTPATIENT)
Dept: UROLOGY | Facility: CLINIC | Age: 40
End: 2020-06-18

## 2020-10-05 ENCOUNTER — TELEPHONE (OUTPATIENT)
Dept: UROLOGY | Facility: CLINIC | Age: 40
End: 2020-10-05

## 2020-10-05 NOTE — TELEPHONE ENCOUNTER
----- Message from Que Bueno sent at 10/5/2020  2:49 PM CDT -----  Contact: pt  Pt calling to speak with staff re Rx         Please contact pt [phone]

## 2020-10-13 ENCOUNTER — OFFICE VISIT (OUTPATIENT)
Dept: UROLOGY | Facility: CLINIC | Age: 40
End: 2020-10-13
Payer: COMMERCIAL

## 2020-10-13 VITALS
HEART RATE: 81 BPM | HEIGHT: 62 IN | BODY MASS INDEX: 28.16 KG/M2 | SYSTOLIC BLOOD PRESSURE: 113 MMHG | WEIGHT: 153 LBS | DIASTOLIC BLOOD PRESSURE: 81 MMHG

## 2020-10-13 DIAGNOSIS — R10.2 PELVIC PAIN IN FEMALE: ICD-10-CM

## 2020-10-13 DIAGNOSIS — R10.2 PELVIC PAIN: ICD-10-CM

## 2020-10-13 DIAGNOSIS — N32.81 OAB (OVERACTIVE BLADDER): ICD-10-CM

## 2020-10-13 DIAGNOSIS — N30.10 IC (INTERSTITIAL CYSTITIS): Primary | ICD-10-CM

## 2020-10-13 PROCEDURE — 95970 ALYS NPGT W/O PRGRMG: CPT | Mod: S$GLB,,, | Performed by: UROLOGY

## 2020-10-13 PROCEDURE — 3008F BODY MASS INDEX DOCD: CPT | Mod: CPTII,S$GLB,, | Performed by: UROLOGY

## 2020-10-13 PROCEDURE — 3008F PR BODY MASS INDEX (BMI) DOCUMENTED: ICD-10-PCS | Mod: CPTII,S$GLB,, | Performed by: UROLOGY

## 2020-10-13 PROCEDURE — 99214 OFFICE O/P EST MOD 30 MIN: CPT | Mod: 25,S$GLB,, | Performed by: UROLOGY

## 2020-10-13 PROCEDURE — 51700 PR IRRIGATION, BLADDER: ICD-10-PCS | Mod: S$GLB,,, | Performed by: UROLOGY

## 2020-10-13 PROCEDURE — 99214 PR OFFICE/OUTPT VISIT, EST, LEVL IV, 30-39 MIN: ICD-10-PCS | Mod: 25,S$GLB,, | Performed by: UROLOGY

## 2020-10-13 PROCEDURE — 99999 PR PBB SHADOW E&M-EST. PATIENT-LVL III: ICD-10-PCS | Mod: PBBFAC,,, | Performed by: UROLOGY

## 2020-10-13 PROCEDURE — 99999 PR PBB SHADOW E&M-EST. PATIENT-LVL III: CPT | Mod: PBBFAC,,, | Performed by: UROLOGY

## 2020-10-13 PROCEDURE — 51700 IRRIGATION OF BLADDER: CPT | Mod: S$GLB,,, | Performed by: UROLOGY

## 2020-10-13 PROCEDURE — 95970 PR ANALYZE NEUROSTIM,NO REPROG: ICD-10-PCS | Mod: S$GLB,,, | Performed by: UROLOGY

## 2020-10-13 RX ORDER — METHENAMINE, SODIUM PHOSPHATE, MONOBASIC, ANHYDROUS, PHENYL SALICYLATE, METHYLENE BLUE AND HYOSCYAMINE SULFATE 118; 40.8; 36; 10; .12 MG/1; MG/1; MG/1; MG/1; MG/1
1 CAPSULE ORAL 3 TIMES DAILY
Qty: 90 CAPSULE | Refills: 11 | Status: SHIPPED | OUTPATIENT
Start: 2020-10-13 | End: 2021-08-20 | Stop reason: SDUPTHER

## 2020-10-13 RX ORDER — AMITRIPTYLINE HYDROCHLORIDE 25 MG/1
25 TABLET, FILM COATED ORAL NIGHTLY PRN
Qty: 30 TABLET | Refills: 11 | Status: SHIPPED | OUTPATIENT
Start: 2020-10-13 | End: 2021-08-20

## 2020-10-13 RX ORDER — BUPIVACAINE HYDROCHLORIDE 5 MG/ML
50 INJECTION, SOLUTION PERINEURAL ONCE
Status: COMPLETED | OUTPATIENT
Start: 2020-10-13 | End: 2020-10-13

## 2020-10-13 RX ORDER — INDOMETHACIN 25 MG/1
50 CAPSULE ORAL
Status: COMPLETED | OUTPATIENT
Start: 2020-10-13 | End: 2020-10-13

## 2020-10-13 RX ORDER — HEPARIN SODIUM 10000 [USP'U]/ML
20000 INJECTION, SOLUTION INTRAVENOUS; SUBCUTANEOUS
Status: COMPLETED | OUTPATIENT
Start: 2020-10-13 | End: 2020-10-13

## 2020-10-13 RX ORDER — LIDOCAINE HYDROCHLORIDE 10 MG/ML
1 INJECTION INFILTRATION; PERINEURAL
Status: COMPLETED | OUTPATIENT
Start: 2020-10-13 | End: 2020-10-13

## 2020-10-13 RX ORDER — TAMSULOSIN HYDROCHLORIDE 0.4 MG/1
0.4 CAPSULE ORAL NIGHTLY
Qty: 30 CAPSULE | Refills: 11 | Status: SHIPPED | OUTPATIENT
Start: 2020-10-13 | End: 2021-08-20 | Stop reason: SDUPTHER

## 2020-10-13 RX ORDER — AMITRIPTYLINE HYDROCHLORIDE 25 MG/1
25 TABLET, FILM COATED ORAL NIGHTLY PRN
Qty: 30 TABLET | Refills: 11 | Status: SHIPPED | OUTPATIENT
Start: 2020-10-13 | End: 2020-10-13

## 2020-10-13 RX ORDER — METHENAMINE, SODIUM PHOSPHATE, MONOBASIC, ANHYDROUS, PHENYL SALICYLATE, METHYLENE BLUE AND HYOSCYAMINE SULFATE 118; 40.8; 36; 10; .12 MG/1; MG/1; MG/1; MG/1; MG/1
1 CAPSULE ORAL 3 TIMES DAILY
Qty: 90 CAPSULE | Refills: 11 | Status: SHIPPED | OUTPATIENT
Start: 2020-10-13 | End: 2020-10-13 | Stop reason: SDUPTHER

## 2020-10-13 RX ORDER — TAMSULOSIN HYDROCHLORIDE 0.4 MG/1
0.4 CAPSULE ORAL NIGHTLY
Qty: 30 CAPSULE | Refills: 11 | Status: SHIPPED | OUTPATIENT
Start: 2020-10-13 | End: 2020-10-13 | Stop reason: SDUPTHER

## 2020-10-13 RX ADMIN — BUPIVACAINE HYDROCHLORIDE 250 MG: 5 INJECTION, SOLUTION PERINEURAL at 09:10

## 2020-10-13 RX ADMIN — INDOMETHACIN 50 MEQ: 25 CAPSULE ORAL at 09:10

## 2020-10-13 RX ADMIN — HEPARIN SODIUM 20000 UNITS: 10000 INJECTION, SOLUTION INTRAVENOUS; SUBCUTANEOUS at 09:10

## 2020-10-13 RX ADMIN — LIDOCAINE HYDROCHLORIDE 1 ML: 10 INJECTION INFILTRATION; PERINEURAL at 09:10

## 2020-10-13 NOTE — LETTER
October 13, 2020      Tennille Amaro MD  [address] [facility] [address]           [facility] [address]  [address]  [facility] [address]  Phone: [phone]          Patient: Chelsea Oconnell   MR Number: [id number]   YOB: 1980   Date of Visit: 10/13/2020       Dear Dr. Tennille Amaro:    Thank you for referring Chelsea Oconnell to me for evaluation. Attached you will find relevant portions of my assessment and plan of care.    If you have questions, please do not hesitate to call me. I look forward to following Chelsea Oconnell along with you.    Sincerely,    Donny Calle MD    Enclosure  CC:  No Recipients    If you would like to receive this communication electronically, please contact [email or web address] or [phone] to request more information on [employer] access.    For providers and/or their staff who would like to refer a patient to [facility], please contact us through our one-stop-shop provider referral line, [facility], at [phone].    If you feel you have received this communication in error or would no longer like to receive these types of communications, please e-mail [email or web address]
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
## 2021-07-27 NOTE — TELEPHONE ENCOUNTER
Pt wanted to know the names of some compound pharmacy to get her IC meds. I gave her the numbers to [facility] and [facility]   See result notes.

## 2021-08-20 ENCOUNTER — OFFICE VISIT (OUTPATIENT)
Dept: UROLOGY | Facility: CLINIC | Age: 41
End: 2021-08-20
Payer: COMMERCIAL

## 2021-08-20 VITALS
WEIGHT: 158.75 LBS | HEART RATE: 82 BPM | HEIGHT: 61 IN | DIASTOLIC BLOOD PRESSURE: 82 MMHG | BODY MASS INDEX: 29.97 KG/M2 | SYSTOLIC BLOOD PRESSURE: 112 MMHG

## 2021-08-20 DIAGNOSIS — R10.2 PELVIC PAIN: ICD-10-CM

## 2021-08-20 DIAGNOSIS — R10.2 PELVIC PAIN IN FEMALE: ICD-10-CM

## 2021-08-20 DIAGNOSIS — N30.10 IC (INTERSTITIAL CYSTITIS): Primary | ICD-10-CM

## 2021-08-20 DIAGNOSIS — R39.89 BLADDER PAIN: ICD-10-CM

## 2021-08-20 DIAGNOSIS — N32.81 OAB (OVERACTIVE BLADDER): ICD-10-CM

## 2021-08-20 DIAGNOSIS — R33.9 INCOMPLETE BLADDER EMPTYING: ICD-10-CM

## 2021-08-20 PROCEDURE — 3008F PR BODY MASS INDEX (BMI) DOCUMENTED: ICD-10-PCS | Mod: CPTII,S$GLB,, | Performed by: UROLOGY

## 2021-08-20 PROCEDURE — 99999 PR PBB SHADOW E&M-EST. PATIENT-LVL III: ICD-10-PCS | Mod: PBBFAC,,, | Performed by: UROLOGY

## 2021-08-20 PROCEDURE — 1125F AMNT PAIN NOTED PAIN PRSNT: CPT | Mod: CPTII,S$GLB,, | Performed by: UROLOGY

## 2021-08-20 PROCEDURE — 1159F MED LIST DOCD IN RCRD: CPT | Mod: CPTII,S$GLB,, | Performed by: UROLOGY

## 2021-08-20 PROCEDURE — 1125F PR PAIN SEVERITY QUANTIFIED, PAIN PRESENT: ICD-10-PCS | Mod: CPTII,S$GLB,, | Performed by: UROLOGY

## 2021-08-20 PROCEDURE — 99215 PR OFFICE/OUTPT VISIT, EST, LEVL V, 40-54 MIN: ICD-10-PCS | Mod: 25,S$GLB,, | Performed by: UROLOGY

## 2021-08-20 PROCEDURE — 3079F DIAST BP 80-89 MM HG: CPT | Mod: CPTII,S$GLB,, | Performed by: UROLOGY

## 2021-08-20 PROCEDURE — 95971 ALYS SMPL SP/PN NPGT W/PRGRM: CPT | Mod: S$GLB,,, | Performed by: UROLOGY

## 2021-08-20 PROCEDURE — 95971 PR ANALYZE NEUROSTIM,SIMPLE/PROG: ICD-10-PCS | Mod: S$GLB,,, | Performed by: UROLOGY

## 2021-08-20 PROCEDURE — 3074F PR MOST RECENT SYSTOLIC BLOOD PRESSURE < 130 MM HG: ICD-10-PCS | Mod: CPTII,S$GLB,, | Performed by: UROLOGY

## 2021-08-20 PROCEDURE — 51700 IRRIGATION OF BLADDER: CPT | Mod: S$GLB,,, | Performed by: UROLOGY

## 2021-08-20 PROCEDURE — 3008F BODY MASS INDEX DOCD: CPT | Mod: CPTII,S$GLB,, | Performed by: UROLOGY

## 2021-08-20 PROCEDURE — 3074F SYST BP LT 130 MM HG: CPT | Mod: CPTII,S$GLB,, | Performed by: UROLOGY

## 2021-08-20 PROCEDURE — 99215 OFFICE O/P EST HI 40 MIN: CPT | Mod: 25,S$GLB,, | Performed by: UROLOGY

## 2021-08-20 PROCEDURE — 51700 PR IRRIGATION, BLADDER: ICD-10-PCS | Mod: S$GLB,,, | Performed by: UROLOGY

## 2021-08-20 PROCEDURE — 1159F PR MEDICATION LIST DOCUMENTED IN MEDICAL RECORD: ICD-10-PCS | Mod: CPTII,S$GLB,, | Performed by: UROLOGY

## 2021-08-20 PROCEDURE — 3079F PR MOST RECENT DIASTOLIC BLOOD PRESSURE 80-89 MM HG: ICD-10-PCS | Mod: CPTII,S$GLB,, | Performed by: UROLOGY

## 2021-08-20 PROCEDURE — 99999 PR PBB SHADOW E&M-EST. PATIENT-LVL III: CPT | Mod: PBBFAC,,, | Performed by: UROLOGY

## 2021-08-20 RX ORDER — AMITRIPTYLINE HYDROCHLORIDE 25 MG/1
25 TABLET, FILM COATED ORAL NIGHTLY PRN
Qty: 90 TABLET | Refills: 3 | Status: SHIPPED | OUTPATIENT
Start: 2021-08-20 | End: 2023-08-07

## 2021-08-20 RX ORDER — GABAPENTIN 300 MG/1
300 CAPSULE ORAL 3 TIMES DAILY
Qty: 90 CAPSULE | Refills: 11 | Status: SHIPPED | OUTPATIENT
Start: 2021-08-20 | End: 2022-07-14 | Stop reason: SDUPTHER

## 2021-08-20 RX ORDER — INDOMETHACIN 25 MG/1
50 CAPSULE ORAL
Status: COMPLETED | OUTPATIENT
Start: 2021-08-20 | End: 2021-08-20

## 2021-08-20 RX ORDER — TAMSULOSIN HYDROCHLORIDE 0.4 MG/1
0.4 CAPSULE ORAL NIGHTLY
Qty: 90 CAPSULE | Refills: 3 | Status: SHIPPED | OUTPATIENT
Start: 2021-08-20 | End: 2022-03-15 | Stop reason: SDUPTHER

## 2021-08-20 RX ORDER — HEPARIN SODIUM 10000 [USP'U]/ML
20000 INJECTION, SOLUTION INTRAVENOUS; SUBCUTANEOUS
Status: COMPLETED | OUTPATIENT
Start: 2021-08-20 | End: 2021-08-20

## 2021-08-20 RX ORDER — LIDOCAINE HYDROCHLORIDE 10 MG/ML
1 INJECTION INFILTRATION; PERINEURAL
Status: COMPLETED | OUTPATIENT
Start: 2021-08-20 | End: 2021-08-20

## 2021-08-20 RX ORDER — BUPIVACAINE HYDROCHLORIDE 5 MG/ML
50 INJECTION, SOLUTION PERINEURAL ONCE
Status: COMPLETED | OUTPATIENT
Start: 2021-08-20 | End: 2021-08-20

## 2021-08-20 RX ORDER — METHENAMINE, SODIUM PHOSPHATE, MONOBASIC, ANHYDROUS, PHENYL SALICYLATE, METHYLENE BLUE AND HYOSCYAMINE SULFATE 118; 40.8; 36; 10; .12 MG/1; MG/1; MG/1; MG/1; MG/1
1 CAPSULE ORAL 3 TIMES DAILY PRN
Qty: 90 CAPSULE | Refills: 11 | Status: SHIPPED | OUTPATIENT
Start: 2021-08-20 | End: 2021-08-20

## 2021-08-20 RX ADMIN — INDOMETHACIN 50 MEQ: 25 CAPSULE ORAL at 11:08

## 2021-08-20 RX ADMIN — BUPIVACAINE HYDROCHLORIDE 250 MG: 5 INJECTION, SOLUTION PERINEURAL at 11:08

## 2021-08-20 RX ADMIN — HEPARIN SODIUM 20000 UNITS: 10000 INJECTION, SOLUTION INTRAVENOUS; SUBCUTANEOUS at 11:08

## 2021-08-20 RX ADMIN — LIDOCAINE HYDROCHLORIDE 1 ML: 10 INJECTION INFILTRATION; PERINEURAL at 11:08

## 2022-02-24 ENCOUNTER — TELEPHONE (OUTPATIENT)
Dept: UROLOGY | Facility: CLINIC | Age: 42
End: 2022-02-24
Payer: COMMERCIAL

## 2022-02-24 NOTE — TELEPHONE ENCOUNTER
----- Message from Abbey Valencia LPN sent at 2/23/2022  3:57 PM CST -----  Regarding: FW: speak with nurse  Contact: patient    ----- Message -----  From: Kylee Salinas  Sent: 2/23/2022   3:34 PM CST  To: Luc TURNER Staff  Subject: speak with nurse                                 [phone]   please call patient said she need to get in for bladder pain I tried to schedule took me out to may of this year patient need something soon waiting on a call back from the nurse thanks.

## 2022-03-15 ENCOUNTER — OFFICE VISIT (OUTPATIENT)
Dept: UROLOGY | Facility: CLINIC | Age: 42
End: 2022-03-15
Payer: COMMERCIAL

## 2022-03-15 ENCOUNTER — HOSPITAL ENCOUNTER (OUTPATIENT)
Dept: RADIOLOGY | Facility: HOSPITAL | Age: 42
Discharge: HOME | End: 2022-03-15
Attending: UROLOGY
Payer: COMMERCIAL

## 2022-03-15 VITALS
WEIGHT: 162.5 LBS | BODY MASS INDEX: 29.9 KG/M2 | DIASTOLIC BLOOD PRESSURE: 72 MMHG | SYSTOLIC BLOOD PRESSURE: 118 MMHG | HEART RATE: 74 BPM | HEIGHT: 62 IN

## 2022-03-15 DIAGNOSIS — N30.10 IC (INTERSTITIAL CYSTITIS): Primary | ICD-10-CM

## 2022-03-15 DIAGNOSIS — R39.82 CHRONIC BLADDER PAIN: ICD-10-CM

## 2022-03-15 DIAGNOSIS — N32.81 OAB (OVERACTIVE BLADDER): ICD-10-CM

## 2022-03-15 DIAGNOSIS — R10.2 PELVIC PAIN IN FEMALE: ICD-10-CM

## 2022-03-15 DIAGNOSIS — Z96.82 NEUROSTIMULATOR DEVICE IN SITU: ICD-10-CM

## 2022-03-15 DIAGNOSIS — R10.2 PELVIC PAIN: ICD-10-CM

## 2022-03-15 DIAGNOSIS — R33.9 INCOMPLETE BLADDER EMPTYING: ICD-10-CM

## 2022-03-15 PROCEDURE — 99215 OFFICE O/P EST HI 40 MIN: CPT | Mod: 25,S$GLB,, | Performed by: UROLOGY

## 2022-03-15 PROCEDURE — 72170 X-RAY EXAM OF PELVIS: CPT | Mod: 26,,, | Performed by: RADIOLOGY

## 2022-03-15 PROCEDURE — 72220 X-RAY EXAM SACRUM TAILBONE: CPT | Mod: TC

## 2022-03-15 PROCEDURE — 95970 PR ANALYZE NEUROSTIM,NO REPROG: ICD-10-PCS | Mod: S$GLB,,, | Performed by: UROLOGY

## 2022-03-15 PROCEDURE — 72220 X-RAY EXAM SACRUM TAILBONE: CPT | Mod: 26,,, | Performed by: RADIOLOGY

## 2022-03-15 PROCEDURE — 3008F BODY MASS INDEX DOCD: CPT | Mod: CPTII,S$GLB,, | Performed by: UROLOGY

## 2022-03-15 PROCEDURE — 51700 IRRIGATION OF BLADDER: CPT | Mod: S$GLB,,, | Performed by: UROLOGY

## 2022-03-15 PROCEDURE — 51700 PR IRRIGATION, BLADDER: ICD-10-PCS | Mod: S$GLB,,, | Performed by: UROLOGY

## 2022-03-15 PROCEDURE — 72220 XR SACRUM AND COCCYX: ICD-10-PCS | Mod: 26,,, | Performed by: RADIOLOGY

## 2022-03-15 PROCEDURE — 1159F MED LIST DOCD IN RCRD: CPT | Mod: CPTII,S$GLB,, | Performed by: UROLOGY

## 2022-03-15 PROCEDURE — 99999 PR PBB SHADOW E&M-EST. PATIENT-LVL V: ICD-10-PCS | Mod: PBBFAC,,, | Performed by: UROLOGY

## 2022-03-15 PROCEDURE — 95970 ALYS NPGT W/O PRGRMG: CPT | Mod: S$GLB,,, | Performed by: UROLOGY

## 2022-03-15 PROCEDURE — 3074F SYST BP LT 130 MM HG: CPT | Mod: CPTII,S$GLB,, | Performed by: UROLOGY

## 2022-03-15 PROCEDURE — 3008F PR BODY MASS INDEX (BMI) DOCUMENTED: ICD-10-PCS | Mod: CPTII,S$GLB,, | Performed by: UROLOGY

## 2022-03-15 PROCEDURE — 72170 X-RAY EXAM OF PELVIS: CPT | Mod: TC

## 2022-03-15 PROCEDURE — 99215 PR OFFICE/OUTPT VISIT, EST, LEVL V, 40-54 MIN: ICD-10-PCS | Mod: 25,S$GLB,, | Performed by: UROLOGY

## 2022-03-15 PROCEDURE — 3074F PR MOST RECENT SYSTOLIC BLOOD PRESSURE < 130 MM HG: ICD-10-PCS | Mod: CPTII,S$GLB,, | Performed by: UROLOGY

## 2022-03-15 PROCEDURE — 99999 PR PBB SHADOW E&M-EST. PATIENT-LVL V: CPT | Mod: PBBFAC,,, | Performed by: UROLOGY

## 2022-03-15 PROCEDURE — 72170 XR PELVIS ROUTINE AP: ICD-10-PCS | Mod: 26,,, | Performed by: RADIOLOGY

## 2022-03-15 PROCEDURE — 1159F PR MEDICATION LIST DOCUMENTED IN MEDICAL RECORD: ICD-10-PCS | Mod: CPTII,S$GLB,, | Performed by: UROLOGY

## 2022-03-15 PROCEDURE — 3078F PR MOST RECENT DIASTOLIC BLOOD PRESSURE < 80 MM HG: ICD-10-PCS | Mod: CPTII,S$GLB,, | Performed by: UROLOGY

## 2022-03-15 PROCEDURE — 3078F DIAST BP <80 MM HG: CPT | Mod: CPTII,S$GLB,, | Performed by: UROLOGY

## 2022-03-15 RX ORDER — INDOMETHACIN 25 MG/1
50 CAPSULE ORAL
Status: COMPLETED | OUTPATIENT
Start: 2022-03-15 | End: 2022-03-15

## 2022-03-15 RX ORDER — HEPARIN SODIUM 10000 [USP'U]/ML
20000 INJECTION, SOLUTION INTRAVENOUS; SUBCUTANEOUS
Status: COMPLETED | OUTPATIENT
Start: 2022-03-15 | End: 2022-03-15

## 2022-03-15 RX ORDER — BUPIVACAINE HYDROCHLORIDE 5 MG/ML
50 INJECTION, SOLUTION PERINEURAL ONCE
Status: COMPLETED | OUTPATIENT
Start: 2022-03-15 | End: 2022-03-15

## 2022-03-15 RX ORDER — LIDOCAINE HYDROCHLORIDE 10 MG/ML
1 INJECTION INFILTRATION; PERINEURAL
Status: COMPLETED | OUTPATIENT
Start: 2022-03-15 | End: 2022-03-15

## 2022-03-15 RX ORDER — LORAZEPAM 1 MG/1
1 TABLET ORAL NIGHTLY PRN
Qty: 30 TABLET | Refills: 1 | Status: SHIPPED | OUTPATIENT
Start: 2022-03-15 | End: 2023-07-25

## 2022-03-15 RX ORDER — TAMSULOSIN HYDROCHLORIDE 0.4 MG/1
0.4 CAPSULE ORAL NIGHTLY
Qty: 90 CAPSULE | Refills: 3 | Status: SHIPPED | OUTPATIENT
Start: 2022-03-15 | End: 2022-07-07

## 2022-03-15 RX ORDER — HYDROXYCHLOROQUINE SULFATE 200 MG/1
200 TABLET, FILM COATED ORAL DAILY
COMMUNITY

## 2022-03-15 RX ADMIN — INDOMETHACIN 50 MEQ: 25 CAPSULE ORAL at 11:03

## 2022-03-15 RX ADMIN — BUPIVACAINE HYDROCHLORIDE 250 MG: 5 INJECTION, SOLUTION PERINEURAL at 11:03

## 2022-03-15 RX ADMIN — HEPARIN SODIUM 20000 UNITS: 10000 INJECTION, SOLUTION INTRAVENOUS; SUBCUTANEOUS at 11:03

## 2022-03-15 RX ADMIN — LIDOCAINE HYDROCHLORIDE 1 ML: 10 INJECTION INFILTRATION; PERINEURAL at 11:03

## 2022-03-15 NOTE — PROGRESS NOTES
CC: IC flare up, pelvic floor dysfunction    Chelsea Oconnell is a 41 y.o. woman who is here for the evaluation of Bladder Pain    C/o incomplete bladder emptying, difficult urination, and pelvic pain.  She spends majority of time in the bathroom because of urinary problems.  Urinates every 30 mins or less, and catheterize herself.  Some residual urine noted but still feels that she did not empty her bladder bladder.    Here for follow up due to worsening bladder pain. Started on Amitriptyline, Flomax, and Uro-MP at last visit one year ago. She intermittently takes her Amitriptyline due to drowsiness. She is taking her flomax with minimal improvement. Uribel is the only medication that has improved her pain. She has to CIC after every void due to incomplete emptying. PVR today 190 mL. She is not interested in hydrodistention due to IC flares after the procedure. She states Botox helped with the pain, but she had significant bleeding and UTI's after treatment. She is currently not interested in pursuing either treatment modality. Her voiding symptoms and pain are causing significant distress in her life.    She reports increased IC symptoms for the past several months. She reports increased urinary frequency, burning with urination and reports sensation of incomplete emptying. She reports having to do CIC x 1-2 per day for the past 2 months with outputs of 100-150cc.   She reports a prior UTI which was treated with antibiotics by her PCP after a positive culture. She felt she was having another UTI in 8/2021 but negative for nitrite and leuks.  She reports a rectal surgery in June 2021.    s/p InterStim stage I for Incomplete bladder emptying and OAB on 7/31/19.  She reports that InterStim test stimulation improved her voiding function as following:  Day time frequency every 2 hours to only 5 x a day.  Nocturia 3 x a night to 1 x a night.  Much better voiding and easier urination.  Even her pelvic pain is much  better.  Reports at least 80 % improvement in overall her urinary symptoms.    Initially saw her on 5/7/19 by her urologist in [place], [place] for refractory IC.  She lives in [place], [place] and has seen many urologists over the years.  She presents with at least 5 year hx of refractory bladder and pelvic pain with frequency, urgency, difficulty of emptying bladder.  Underwent many treatment for her urinary problems but with minimal improvement.  C/o constant pain in the bladder and pelvic area.  She wishes to get her bladder removed but her urologist in [place] did not do such surgery and refer her to me.  Had botox injection to the bladder but developed urinary retention and has done CIC for 1 year.  Now she is able to urinate but only small volume at a time with straining.    Urination symptoms prior to InterStim Therapy: Positive for frequency, urgency, nocturia and incomplete emptying, straining.  Denies flank pain     Had 5 c-sections, 4 to 5 Gyn surgeries including hysterectomy ( no malignancy).  S/p removal of liver hemangioma.    So underwent FUDS and cysto under anesthesia on 6/5/19 by me.    Procedure Date:  06/05/2019  Pre-OP Diagnosis:   Difficulty in voiding, frequency and urgency, intermittency, incomplete bladder emptying, urethral / bladder pain  Post-OP Diagnosis:   Same, pelvic floor dysfunction  Procedure:   Complex Cystometrogram   Voiding / Pressure Study with Intrarectal Balloon   Complex Uroflow   Electromyogram of Anal Sphincter.   Fluoroscopy less than 1 hour  Findings:   Initial Uroflow study: voided 20 ml, PVR 0 ml, Qmax 2 ml/sec  --- Bladder ---   CYSTOMETROGRAM ( Filling Phase ):   Cystometric Numeric Data:   - First Desire (Sensation): 9 mL at 1 cm of water pressure.   - Normal Desire: 18 mL at 1 cm of water.   - Strong Desire: 31 mL at 1 cm of water.   - Urgency (Imminent Void) : 48 mL at 19 cm of water.   - Maximum Cystometric Capacity: 85 mL at 25 cm of water pressure.   Compliance:    - low.   Leak Point Pressure:   - Valsalva ( Abdominal ) Leak Point Pressure: none.   UROFLOW:   - Voided Volume: unable to void, catheter removed but still unable to void.  At the end, she uses a bathroom, urinated 100 mL.   - Residual Urine: more than 100 mL.   - Maximum Flow Rate: n/a mL/sec.   - Flow Pattern: n/a but suspect an intermittent, abdominal straining.   VOIDING PRESSURE STUDY ( Voiding Phase ):   Detrusor Pressure:   - Maximum Detrusor Pressure: na.   - Detrusor Pressure at Maximum Flow: na.   - Detrusor Contraction Characteristics: na .   ELECTROMYOGRAM:   - unable to relax at the time of voiding  CONCLUSIONS:   1. Pelvic floor dysfunction  2. Chronic pelvic pain  3. Incomplete bladder emptying     Proceed with cysto under anesthesia with hydrodistention, urethral dilation.  Consider Physical Therapy, flomax and valium, possible InterStim Therapy    Procedure(s) Performed: 6/5/19  1.  Cystoscopy with hydrodistension  2.  Urethral dilation  3.  Bladder instillation  Findings:   - 1L anesthetic bladder capacity  - Diffuse bladder erythema, minimal glomerulations, no Hunner lesions  - Urethra dilated to 32 Fr with urethral sounds    Since her cystoscopic surgery, she is able to void somewhat better on flomax.  Was unable to undergo physical therapy due to her insurance coverage.  Vaginal suppository somewhat helpful, but is not able to use valium into the vagina due to recurrent yeast infection.  She has taken valium orally at night which has helped her.  Has been taking ibuprofen 200 mg 3 to 4 pills at a time 2 to 3 x a day for chronic pelvic pain.    C/o constant pelvic and urethral pain regardless whether her bladder is full or not.  She reports that she does not feel emptying her bladder completely.  She voids at least every 1 hour.  Has been on Uribel at least every other day due to her chronic bladder pain.    She did physical therapy in [place] in the past and noted minimal  improvement.  Had botox injection at [facility], which made her symptoms worse.      Past Medical History:   Diagnosis Date    Crohn's colitis     Lupus (systemic lupus erythematosus)      Past Surgical History:   Procedure Laterality Date    CYSTOSCOPY WITH HYDRODISTENSION AND BIOPSY OF BLADDER N/A 6/5/2019    Procedure: CYSTOSCOPY, WITH BLADDER HYDRODISTENSION AND BIOPSY;  Surgeon: Donny Calle MD;  [facility];  Service: Urology;  Laterality: N/A;  1 hour    DILATION OF URETHRA N/A 6/5/2019    Procedure: DILATION, URETHRA;  Surgeon: Donny Calle MD;  [facility];  Service: Urology;  Laterality: N/A;    FLUOROSCOPIC URODYNAMIC STUDY N/A 6/5/2019    Procedure: URODYNAMIC STUDY, FLUOROSCOPIC;  Surgeon: Donny Calle MD;  [facility];  Service: Urology;  Laterality: N/A;  1 hour    FLUOROSCOPY N/A 7/31/2019    Procedure: FLUOROSCOPY;  Surgeon: Donny Calle MD;  [facility];  Service: Urology;  Laterality: N/A;    HYSTERECTOMY      INSERTION OF SACRAL NEUROSTIMULATOR GENERATOR Left 8/14/2019    Procedure: INSERTION, PULSE GENERATOR, NEUROSTIMULATOR, SACRAL Stage2;  Surgeon: Donny Calle MD;  [facility];  Service: Urology;  Laterality: Left;    INSERTION OF SACRAL NEUROSTIMULATOR, ELECTRODES, AND IMPLANTABLE PULSE GENERATOR (IPG) Right 7/31/2019    Procedure: INSERTION, ELECTRODE LEAD, NEUROSTIMULATOR, SACRAL;  Surgeon: Donny Calle MD;  [facility];  Service: Urology;  Laterality: Right;    INSTILLATION OF URINARY BLADDER N/A 6/5/2019    Procedure: INSTILLATION, BLADDER;  Surgeon: Donny Calle MD;  [facility];  Service: Urology;  Laterality: N/A;    liver      hemangioma removed     Social History     Tobacco Use    Smoking status: Never Smoker    Smokeless tobacco: Never Used   Substance Use Topics    Alcohol use: Never    Drug use: Never     History reviewed. No pertinent family  history.  Allergy:  Review of patient's allergies indicates:   Allergen Reactions    Adhesive Hives     Had reaction to surgical tape after last procedure    Iodine and iodide containing products Rash and Swelling    Shellfish containing products Rash and Swelling    Sulfa (sulfonamide antibiotics) Rash and Swelling    Xanax [alprazolam] Hives    Sertraline Hives     Outpatient Encounter Medications as of 3/15/2022   Medication Sig Dispense Refill    amitriptyline (ELAVIL) 25 MG tablet Take 1 tablet (25 mg total) by mouth nightly as needed for Insomnia. One by mouth every evening at bedtime to help with bladder pain and sleeping 90 tablet 3    cyanocobalamin (VITAMIN B-12) 1000 MCG tablet Take 100 mcg by mouth every 12 (twelve) hours.      estradiol (ESTRACE) 2 MG tablet       gabapentin (NEURONTIN) 300 MG capsule Take 1 capsule (300 mg total) by mouth 3 (three) times daily. 90 capsule 11    hydrOXYchloroQUINE (PLAQUENIL) 200 mg tablet Take 200 mg by mouth once daily.      ibuprofen (ADVIL,MOTRIN) 200 MG tablet Take 200 mg by mouth every 6 (six) hours as needed for Pain.      multivitamin-Ca-iron-minerals 27-0.4 mg Tab Take by mouth.      omega-3 acid ethyl esters (LOVAZA) 1 gram capsule Take 1 g by mouth once daily.      phenazopyridine (PYRIDIUM) 100 MG tablet Take 2 tablets (200 mg total) by mouth 3 (three) times daily as needed for Pain (bladder pain). 30 tablet 11    venlafaxine (EFFEXOR-XR) 75 MG 24 hr capsule       [DISCONTINUED] tamsulosin (FLOMAX) 0.4 mg Cap Take 1 capsule (0.4 mg total) by mouth every evening. 90 capsule 3    calcium glycerophosphate (PRELIEF ORAL) Take 2 mg by mouth once daily.      Lactobacillus rhamnosus GG (CULTURELLE) 10 billion cell capsule Take 1 capsule by mouth once daily.      LORazepam (ATIVAN) 1 MG tablet Take 1 tablet (1 mg total) by mouth nightly as needed for Anxiety. Take 1 tablet by mouth 30 mins before the procedure 30 tablet 1    tamsulosin (FLOMAX)  0.4 mg Cap Take 1 capsule (0.4 mg total) by mouth every evening. 90 capsule 3    [DISCONTINUED] azithromycin (Z-NOHEMI) 250 MG tablet        Facility-Administered Encounter Medications as of 3/15/2022   Medication Dose Route Frequency Provider Last Rate Last Admin    [COMPLETED] BUPivacaine injection 250 mg  50 mL Bladder Instillation Once Donny Calle MD   250 mg at 03/15/22 1120    [COMPLETED] heparin (porcine) injection 20,000 Units  20,000 Units Intravesical 1 time in Clinic/HOD Donny Calle MD   20,000 Units at 03/15/22 1121    [COMPLETED] LIDOcaine HCL 10 mg/ml (1%) injection 1 mL  1 mL Intravesical 1 time in Clinic/HOD Donny Calle MD   1 mL at 03/15/22 1122    [COMPLETED] sodium bicarbonate solution 50 mEq  50 mEq Intravesical 1 time in Clinic/HOD Donny Calle MD   50 mEq at 03/15/22 1123     Review of Systems   ROS  Physical Exam     Vitals:    03/15/22 0913   BP: 118/72   Pulse: 74     Physical Exam  Constitutional:       General: She is not in acute distress.     Appearance: She is well-developed. She is not diaphoretic.   HENT:      Head: Normocephalic and atraumatic.      Right Ear: External ear normal.      Left Ear: External ear normal.      Nose: Nose normal.   Eyes:      General: No scleral icterus.     Conjunctiva/sclera: Conjunctivae normal.      Pupils: Pupils are equal, round, and reactive to light.   Neck:      Thyroid: No thyromegaly.      Vascular: No JVD.      Trachea: No tracheal deviation.   Cardiovascular:      Rate and Rhythm: Normal rate and regular rhythm.      Heart sounds: Normal heart sounds. No murmur heard.    No friction rub. No gallop.   Pulmonary:      Effort: Pulmonary effort is normal. No respiratory distress.      Breath sounds: Normal breath sounds. No wheezing.   Abdominal:      General: Bowel sounds are normal. There is no distension.      Palpations: Abdomen is soft. There is no mass.      Tenderness: There is no abdominal tenderness. There is no guarding or  rebound.   Genitourinary:     Vagina: Normal. No vaginal discharge.      Comments: No tenderness at the urethra.  Positive tenderness at the kerrie-urethral area on the both sides.  Positive tenderness on palpation each side of vaginal wall.  No tenderness on palpation at the base of the bladder.    Difficulty in localizing the levator ani on YEE of rectum.  Slight tenderness noted on the levator ani.  Musculoskeletal:         General: No tenderness or deformity. Normal range of motion.      Cervical back: Normal range of motion and neck supple.   Lymphadenopathy:      Cervical: No cervical adenopathy.   Skin:     General: Skin is warm and dry.   Neurological:      Mental Status: She is alert and oriented to person, place, and time.   Psychiatric:         Behavior: Behavior normal.         Thought Content: Thought content normal.         LABS:  Lab Results   Component Value Date    CREATININE 0.8 05/07/2019     Urine Culture, Routine   Date Value Ref Range Status   06/02/2020 No growth  Final     Procedure  InterStim Neurostimulator checked.    Using the , different setting were tried until patient felt the appropriate responses.  3.1 volt on Program 6 vaginal stimulation.  2.2 volts on Program 7 vaginal stimulation  1.8 volts on Program 5 vaginal stimulation.    Impedence checked all fine.  Battery life 23 to 40 months left.    Recommend to change the setting to program 5.  Increased the voltage from 1.7 to 1.8 volt today.  Patient experienced the better stimulation as a result of reprogramming and verbalized the familiarity of using an own controller.    Bladder Instillation Procedure:  A 16 F Gomez catheter was placed and the bladder was drained without problems.    Intravesical cocktail mixture treatment is given in a standard fashion.  The solution of 20,000 unit heparin, 50 ml 0.5 % Marcaine, 10 ml 1% lidocaine, and 10 ml 8.4% sodium bicarbonate was instilled in the bladder, and the catheter  was removed. The patient was instructed to hold the mixture solution in the bladder for 20 to 30 minutes and to void as instructed.    Patient tolerated the procedure well.    Assessment and Plan:  Chelsea was seen today for bladder pain.    Diagnoses and all orders for this visit:    IC (interstitial cystitis)    Chronic bladder pain  -     sodium bicarbonate solution 50 mEq  -     BUPivacaine injection 250 mg  -     LIDOcaine HCL 10 mg/ml (1%) injection 1 mL  -     heparin (porcine) injection 20,000 Units    Pelvic pain in female  -     LORazepam (ATIVAN) 1 MG tablet; Take 1 tablet (1 mg total) by mouth nightly as needed for Anxiety. Take 1 tablet by mouth 30 mins before the procedure  -     Ambulatory referral/consult to Physical/Occupational Therapy; Future    Incomplete bladder emptying    Neurostimulator device in situ  -     X-Ray Pelvis Routine AP; Future  -     X-Ray Sacrum And Coccyx; Future    OAB (overactive bladder)  -     tamsulosin (FLOMAX) 0.4 mg Cap; Take 1 capsule (0.4 mg total) by mouth every evening.    Pelvic pain  -     tamsulosin (FLOMAX) 0.4 mg Cap; Take 1 capsule (0.4 mg total) by mouth every evening.    Initial successful response from InterStim therapy.  She is able to void on her own.  Bladder pain has increased in the last few months to where it is unbearable.  Suspect pelvic floor dysfunction.  Recommend to add Ativan nightly for relaxation.  Continue flomax.  See our physical therapy to see whether they can help her pelvic floor dysfunction.  (had [facility] PT at [facility]).  May consider bilateral sacral nerve stimulatin if not improving.  I strongly advise against cystectomy.  May consider suprapubic catheter to give urethra and pelvic floor rest.    She would like to try bladder instillation since it helps her a lot in her bladder pain. Suspect fibromyalgia is affecting her generator site on the left upper hip.  On Neurontin therapy she gets for her fibromyalgia. Does not  seem to help.    Previously, Interstim reprogramming done from setting 5 to setting 2.    I spent 40 minutes with the patient of which more than half was spent in direct consultation with the patient in regards to our treatment and plan.    Follow-up:  Follow up in about 4 months (around 7/15/2022), or physical therapy.

## 2022-03-22 ENCOUNTER — TELEPHONE (OUTPATIENT)
Dept: UROLOGY | Facility: CLINIC | Age: 42
End: 2022-03-22
Payer: COMMERCIAL

## 2022-03-22 DIAGNOSIS — Z96.82 NEUROSTIMULATOR DEVICE IN SITU: ICD-10-CM

## 2022-03-22 DIAGNOSIS — R33.9 INCOMPLETE BLADDER EMPTYING: Primary | ICD-10-CM

## 2022-03-22 RX ORDER — DOXAZOSIN 1 MG/1
1 TABLET ORAL NIGHTLY
Qty: 30 TABLET | Refills: 11 | Status: SHIPPED | OUTPATIENT
Start: 2022-03-22 | End: 2023-07-25

## 2022-03-22 RX ORDER — URINARY BAG/CATHETER 14 FR-16 "
1 COMBINATION PACKAGE (EA) MISCELLANEOUS
Qty: 180 EACH | Refills: 99 | Status: SHIPPED | OUTPATIENT
Start: 2022-03-22 | End: 2023-12-18 | Stop reason: SDUPTHER

## 2022-03-22 NOTE — TELEPHONE ENCOUNTER
----- Message from Cele Church LPN sent at 3/22/2022  8:45 AM CDT -----  Regarding: FW: results  Contact: pt  Pt had xray for s/p InterStim Therapy, check the position of the device. She would like you to review to be sure all is ok   ----- Message -----  From: Ricco Chakraborty  Sent: 3/22/2022   8:42 AM CDT  To: Luc TURNER Staff  Subject: results                                          Pt requesting a call back in regards to discuss results.      Pt @ [phone]

## 2022-03-22 NOTE — TELEPHONE ENCOUNTER
"Incomplete bladder emptying  -     doxazosin (CARDURA) 1 MG tablet; Take 1 tablet (1 mg total) by mouth every evening.  Dispense: 30 tablet; Refill: 11  -     urinary bag-catheter (VAPRO PLUS INTERMITT CATHETER) 12 Fr- 8" Cmpk; 1 Units by Misc.(Non-Drug; Combo Route) route 6 (six) times daily.  Dispense: 180 each; Refill: 99    Neurostimulator device in situ    her InterStim Therapy is located in the left side and its lead in right S3 in a good location.  No problems noted.  Unable to tolerate flomax.  Try Cardura 1 mg q hs.  She would like to get a female catheter 12 F 8 x a day for her incomplete bladder emptying.  She got the supply from her local urologist but it was a male catheter.  She prefers a female catheter.  "

## 2022-03-22 NOTE — TELEPHONE ENCOUNTER
Left message that I need the name of her catheter supply company and a fax number so I can fax the new rx

## 2022-03-22 NOTE — TELEPHONE ENCOUNTER
----- Message from Kateryna Patterson sent at 3/22/2022  2:01 PM CDT -----  THELMA OROZCO calling regarding Patient Advice (message) for stated she use [facility], [facility]  [address], [place] [address]  Phone: [phone]  Fax: [phone]

## 2022-06-30 ENCOUNTER — TELEPHONE (OUTPATIENT)
Dept: UROLOGY | Facility: CLINIC | Age: 42
End: 2022-06-30
Payer: COMMERCIAL

## 2022-06-30 ENCOUNTER — PATIENT MESSAGE (OUTPATIENT)
Dept: UROLOGY | Facility: CLINIC | Age: 42
End: 2022-06-30
Payer: COMMERCIAL

## 2022-06-30 NOTE — TELEPHONE ENCOUNTER
----- Message from Donny Calle MD sent at 6/30/2022  8:42 AM CDT -----  Regarding: RE: Surgery scheduling  Contact: [phone]  Yes, I am OK with a virtual visit, preferably virtual video visit with me.  Thank you.  ----- Message -----  From: Cele Church LPN  Sent: 6/30/2022   8:30 AM CDT  To: Donny Calle MD  Subject: FW: Surgery scheduling                           Pt was seen 3/15 and was advised to follow up in July to discuss :::  May consider bilateral sacral nerve stimulatin if not improving.  I strongly advise against cystectomy.  May consider suprapubic catheter to give urethra and pelvic floor rest.      She is interests in surgery , but  would like to know if she can do a virtual visit. Is this ok?  ----- Message -----  From: Abbey Valencia LPN  Sent: 6/27/2022   5:37 PM CDT  To: Cele Church LPN  Subject: FW: Surgery scheduling                           I don't see any surgery scheduled or any mention of a surgery in Calle last note.   ----- Message -----  From: Chetna Benson RN  Sent: 6/27/2022   5:15 PM CDT  To: Abbey Valencia LPN  Subject: FW: Surgery scheduling                             ----- Message -----  From: Ana Cosme  Sent: 6/27/2022   2:28 PM CDT  To: Luc TURNER Staff  Subject: Surgery scheduling                               Calling to schedule surgery or virtual appointment to discuss surgery. Patient stated that she is still catheterized and would like to move forward with surgery.

## 2022-07-07 ENCOUNTER — OFFICE VISIT (OUTPATIENT)
Dept: UROLOGY | Facility: CLINIC | Age: 42
End: 2022-07-07
Payer: COMMERCIAL

## 2022-07-07 DIAGNOSIS — R33.9 INCOMPLETE BLADDER EMPTYING: Primary | ICD-10-CM

## 2022-07-07 DIAGNOSIS — R10.2 PELVIC PAIN: ICD-10-CM

## 2022-07-07 DIAGNOSIS — R33.9 INCOMPLETE EMPTYING OF BLADDER: ICD-10-CM

## 2022-07-07 DIAGNOSIS — N32.81 OAB (OVERACTIVE BLADDER): ICD-10-CM

## 2022-07-07 DIAGNOSIS — T85.113A MECHANICAL BREAKDOWN OF GENERATOR OF IMPLANTED ELECTRONIC NEUROSTIMULATOR, INITIAL ENCOUNTER: ICD-10-CM

## 2022-07-07 PROCEDURE — 99215 PR OFFICE/OUTPT VISIT, EST, LEVL V, 40-54 MIN: ICD-10-PCS | Mod: 95,,, | Performed by: UROLOGY

## 2022-07-07 PROCEDURE — 99215 OFFICE O/P EST HI 40 MIN: CPT | Mod: 95,,, | Performed by: UROLOGY

## 2022-07-07 RX ORDER — TAMSULOSIN HYDROCHLORIDE 0.4 MG/1
0.4 CAPSULE ORAL 2 TIMES DAILY
Qty: 180 CAPSULE | Refills: 3 | Status: SHIPPED | OUTPATIENT
Start: 2022-07-07 | End: 2023-08-07 | Stop reason: SDUPTHER

## 2022-07-07 NOTE — PROGRESS NOTES
The patient location is: home  The chief complaint leading to consultation is: unable to empty her bladder on her own    Visit type: audiovisual    Face to Face time with patient: 25 mins  40 minutes of total time spent on the encounter, which includes face to face time and non-face to face time preparing to see the patient (eg, review of tests), Obtaining and/or reviewing separately obtained history, Documenting clinical information in the electronic or other health record, Independently interpreting results (not separately reported) and communicating results to the patient/family/caregiver, or Care coordination (not separately reported).         Each patient to whom he or she provides medical services by telemedicine is:  (1) informed of the relationship between the physician and patient and the respective role of any other health care provider with respect to management of the patient; and (2) notified that he or she may decline to receive medical services by telemedicine and may withdraw from such care at any time.    Notes: CC: incomplete bladder emptying    Chelsea Oconnell is a 41 y.o. woman who is here for a virtual visit to discuss her bladder problems.  Has not been able to urinate on her own for the past couple of months.  She has to do self intermittent catheterization in order to empty her bladder.    C/o incomplete bladder emptying, difficult urination, and pelvic pain.  She spends majority of time in the bathroom because of urinary problems.  Used to urinate every 30 mins or less, and catheterize herself.  Now a days, she is not able to void on her own and has to catheterize all the time.  Some residual urine noted but still feels that she did not empty her bladder bladder.    s/p InterStim therapy for Incomplete bladder emptying and OAB on 7/31/19.  She reports that InterStim test stimulation improved her voiding function significantly.  However, nowadays, she does not feel the proper stimulation from  InterStim Therapy.    Initially saw her on 5/7/19 by her urologist in [place], [place] for refractory IC.  She lives in [place], [place] and has seen many urologists over the years.  She presents with at least 5 year hx of refractory bladder and pelvic pain with frequency, urgency, difficulty of emptying bladder.  Underwent many treatment for her urinary problems but with minimal improvement.  C/o constant pain in the bladder and pelvic area.  She wishes to get her bladder removed but her urologist in [place] did not do such surgery and refer her to me.  Had botox injection to the bladder but developed urinary retention and has done CIC for 1 year.  Now she is able to urinate but only small volume at a time with straining.    Urination symptoms prior to InterStim Therapy: Positive for frequency, urgency, nocturia and incomplete emptying, straining.  Denies flank pain     Had 5 c-sections, 4 to 5 Gyn surgeries including hysterectomy ( no malignancy).  S/p removal of liver hemangioma.    So underwent FUDS and cysto under anesthesia on 6/5/19 by me.    Procedure Date:  06/05/2019  Pre-OP Diagnosis:   Difficulty in voiding, frequency and urgency, intermittency, incomplete bladder emptying, urethral / bladder pain  Post-OP Diagnosis:   Same, pelvic floor dysfunction  Procedure:   Complex Cystometrogram   Voiding / Pressure Study with Intrarectal Balloon   Complex Uroflow   Electromyogram of Anal Sphincter.   Fluoroscopy less than 1 hour  Findings:   Initial Uroflow study: voided 20 ml, PVR 0 ml, Qmax 2 ml/sec  --- Bladder ---   CYSTOMETROGRAM ( Filling Phase ):   Cystometric Numeric Data:   - First Desire (Sensation): 9 mL at 1 cm of water pressure.   - Normal Desire: 18 mL at 1 cm of water.   - Strong Desire: 31 mL at 1 cm of water.   - Urgency (Imminent Void) : 48 mL at 19 cm of water.   - Maximum Cystometric Capacity: 85 mL at 25 cm of water pressure.   Compliance:   - low.   Leak Point Pressure:   - Valsalva ( Abdominal  ) Leak Point Pressure: none.   UROFLOW:   - Voided Volume: unable to void, catheter removed but still unable to void.  At the end, she uses a bathroom, urinated 100 mL.   - Residual Urine: more than 100 mL.   - Maximum Flow Rate: n/a mL/sec.   - Flow Pattern: n/a but suspect an intermittent, abdominal straining.   VOIDING PRESSURE STUDY ( Voiding Phase ):   Detrusor Pressure:   - Maximum Detrusor Pressure: na.   - Detrusor Pressure at Maximum Flow: na.   - Detrusor Contraction Characteristics: na .   ELECTROMYOGRAM:   - unable to relax at the time of voiding  CONCLUSIONS:   1. Pelvic floor dysfunction  2. Chronic pelvic pain  3. Incomplete bladder emptying     Proceed with cysto under anesthesia with hydrodistention, urethral dilation.  Consider Physical Therapy, flomax and valium, possible InterStim Therapy    Procedure(s) Performed: 6/5/19  1.  Cystoscopy with hydrodistension  2.  Urethral dilation  3.  Bladder instillation  Findings:   - 1L anesthetic bladder capacity  - Diffuse bladder erythema, minimal glomerulations, no Hunner lesions  - Urethra dilated to 32 Fr with urethral sounds    Since her cystoscopic surgery, she is able to void somewhat better on flomax.  Was unable to undergo physical therapy due to her insurance coverage.  Vaginal suppository somewhat helpful, but is not able to use valium into the vagina due to recurrent yeast infection.  She has taken valium orally at night which has helped her.  Has been taking ibuprofen 200 mg 3 to 4 pills at a time 2 to 3 x a day for chronic pelvic pain.    C/o constant pelvic and urethral pain regardless whether her bladder is full or not.  She reports that she does not feel emptying her bladder completely.  She voids at least every 1 hour.  Has been on Uribel at least every other day due to her chronic bladder pain.    She did physical therapy in [place] in the past and noted minimal improvement.  Had botox injection at [facility], which made her  symptoms worse.      Past Medical History:   Diagnosis Date    Crohn's colitis     Lupus (systemic lupus erythematosus)      Past Surgical History:   Procedure Laterality Date    CYSTOSCOPY WITH HYDRODISTENSION AND BIOPSY OF BLADDER N/A 6/5/2019    Procedure: CYSTOSCOPY, WITH BLADDER HYDRODISTENSION AND BIOPSY;  Surgeon: Donny Calle MD;  Location: [facility] OR 1ST FLR;  Service: Urology;  Laterality: N/A;  1 hour    DILATION OF URETHRA N/A 6/5/2019    Procedure: DILATION, URETHRA;  Surgeon: Donny Calle MD;  [facility];  Service: Urology;  Laterality: N/A;    FLUOROSCOPIC URODYNAMIC STUDY N/A 6/5/2019    Procedure: URODYNAMIC STUDY, FLUOROSCOPIC;  Surgeon: Donny Calle MD;  [facility];  Service: Urology;  Laterality: N/A;  1 hour    FLUOROSCOPY N/A 7/31/2019    Procedure: FLUOROSCOPY;  Surgeon: Donny Calle MD;  Location: [facility] OR 2ND FLR;  Service: Urology;  Laterality: N/A;    HYSTERECTOMY      INSERTION OF SACRAL NEUROSTIMULATOR GENERATOR Left 8/14/2019    Procedure: INSERTION, PULSE GENERATOR, NEUROSTIMULATOR, SACRAL Stage2;  Surgeon: Donny Calle MD;  Location: [facility] OR 2ND FLR;  Service: Urology;  Laterality: Left;    INSERTION OF SACRAL NEUROSTIMULATOR, ELECTRODES, AND IMPLANTABLE PULSE GENERATOR (IPG) Right 7/31/2019    Procedure: INSERTION, ELECTRODE LEAD, NEUROSTIMULATOR, SACRAL;  Surgeon: Donny Calle MD;  [facility];  Service: Urology;  Laterality: Right;    INSTILLATION OF URINARY BLADDER N/A 6/5/2019    Procedure: INSTILLATION, BLADDER;  Surgeon: Donny Calle MD;  [facility];  Service: Urology;  Laterality: N/A;    liver      hemangioma removed     Social History     Tobacco Use    Smoking status: Never Smoker    Smokeless tobacco: Never Used   Substance Use Topics    Alcohol use: Never    Drug use: Never     No family history on file.  Allergy:  Review of patient's allergies indicates:   Allergen Reactions    Adhesive  "Hives     Had reaction to surgical tape after last procedure    Iodine and iodide containing products Rash and Swelling    Shellfish containing products Rash and Swelling    Sulfa (sulfonamide antibiotics) Rash and Swelling    Xanax [alprazolam] Hives    Sertraline Hives     Outpatient Encounter Medications as of 7/7/2022   Medication Sig Dispense Refill    amitriptyline (ELAVIL) 25 MG tablet Take 1 tablet (25 mg total) by mouth nightly as needed for Insomnia. One by mouth every evening at bedtime to help with bladder pain and sleeping 90 tablet 3    calcium glycerophosphate (PRELIEF ORAL) Take 2 mg by mouth once daily.      cyanocobalamin (VITAMIN B-12) 1000 MCG tablet Take 100 mcg by mouth every 12 (twelve) hours.      doxazosin (CARDURA) 1 MG tablet Take 1 tablet (1 mg total) by mouth every evening. 30 tablet 11    estradiol (ESTRACE) 2 MG tablet       gabapentin (NEURONTIN) 300 MG capsule Take 1 capsule (300 mg total) by mouth 3 (three) times daily. 90 capsule 11    hydrOXYchloroQUINE (PLAQUENIL) 200 mg tablet Take 200 mg by mouth once daily.      ibuprofen (ADVIL,MOTRIN) 200 MG tablet Take 200 mg by mouth every 6 (six) hours as needed for Pain.      Lactobacillus rhamnosus GG (CULTURELLE) 10 billion cell capsule Take 1 capsule by mouth once daily.      LORazepam (ATIVAN) 1 MG tablet Take 1 tablet (1 mg total) by mouth nightly as needed for Anxiety. Take 1 tablet by mouth 30 mins before the procedure 30 tablet 1    multivitamin-Ca-iron-minerals 27-0.4 mg Tab Take by mouth.      omega-3 acid ethyl esters (LOVAZA) 1 gram capsule Take 1 g by mouth once daily.      phenazopyridine (PYRIDIUM) 100 MG tablet Take 2 tablets (200 mg total) by mouth 3 (three) times daily as needed for Pain (bladder pain). 30 tablet 11    tamsulosin (FLOMAX) 0.4 mg Cap Take 1 capsule (0.4 mg total) by mouth 2 (two) times a day. 180 capsule 3    urinary bag-catheter (VAPRO PLUS INTERMITT CATHETER) 12 Fr- 8" Cmpk 1 Units " by Misc.(Non-Drug; Combo Route) route 6 (six) times daily. 180 each 99    venlafaxine (EFFEXOR-XR) 75 MG 24 hr capsule       [DISCONTINUED] tamsulosin (FLOMAX) 0.4 mg Cap Take 1 capsule (0.4 mg total) by mouth every evening. 90 capsule 3     No facility-administered encounter medications on file as of 7/7/2022.     Review of Systems   ROS  Physical Exam     There were no vitals filed for this visit.  Physical Exam  Constitutional:       General: She is not in acute distress.     Appearance: She is well-developed. She is not diaphoretic.   HENT:      Head: Normocephalic and atraumatic.      Right Ear: External ear normal.      Left Ear: External ear normal.      Nose: Nose normal.   Eyes:      General: No scleral icterus.     Conjunctiva/sclera: Conjunctivae normal.      Pupils: Pupils are equal, round, and reactive to light.   Neck:      Thyroid: No thyromegaly.      Vascular: No JVD.      Trachea: No tracheal deviation.   Cardiovascular:      Rate and Rhythm: Normal rate and regular rhythm.      Heart sounds: Normal heart sounds. No murmur heard.    No friction rub. No gallop.   Pulmonary:      Effort: Pulmonary effort is normal. No respiratory distress.      Breath sounds: Normal breath sounds. No wheezing.   Abdominal:      General: Bowel sounds are normal. There is no distension.      Palpations: Abdomen is soft. There is no mass.      Tenderness: There is no abdominal tenderness. There is no guarding or rebound.   Genitourinary:     Vagina: Normal. No vaginal discharge.      Comments: No tenderness at the urethra.  Positive tenderness at the kerrie-urethral area on the both sides.  Positive tenderness on palpation each side of vaginal wall.  No tenderness on palpation at the base of the bladder.    Difficulty in localizing the levator ani on YEE of rectum.  Slight tenderness noted on the levator ani.  Musculoskeletal:         General: No tenderness or deformity. Normal range of motion.      Cervical back:  Normal range of motion and neck supple.   Lymphadenopathy:      Cervical: No cervical adenopathy.   Skin:     General: Skin is warm and dry.   Neurological:      Mental Status: She is alert and oriented to person, place, and time.   Psychiatric:         Behavior: Behavior normal.         Thought Content: Thought content normal.         LABS:  Lab Results   Component Value Date    CREATININE 0.8 05/07/2019     Urine Culture, Routine   Date Value Ref Range Status   06/02/2020 No growth  Final     Last visit:  InterStim Neurostimulator checked.  Using the , different setting were tried until patient felt the appropriate responses.  3.1 volt on Program 6 vaginal stimulation.  2.2 volts on Program 7 vaginal stimulation  1.8 volts on Program 5 vaginal stimulation.  Impedence checked all fine.  Battery life 23 to 40 months left.  Recommend to change the setting to program 5.  Increased the voltage from 1.7 to 1.8 volt today.  Patient experienced the better stimulation as a result of reprogramming and verbalized the familiarity of using an own controller.    Assessment and Plan:  Diagnoses and all orders for this visit:    Incomplete bladder emptying  -     tamsulosin (FLOMAX) 0.4 mg Cap; Take 1 capsule (0.4 mg total) by mouth 2 (two) times a day.    OAB (overactive bladder)  -     tamsulosin (FLOMAX) 0.4 mg Cap; Take 1 capsule (0.4 mg total) by mouth 2 (two) times a day.    Pelvic pain  -     tamsulosin (FLOMAX) 0.4 mg Cap; Take 1 capsule (0.4 mg total) by mouth 2 (two) times a day.    Incomplete emptying of bladder    Mechanical breakdown of generator of implanted electronic neurostimulator, initial encounter    Initial successful response from InterStim therapy.  She is able to void on her own.  Now she does not feel the proper stimulation.  Most likely she will need a revision of InterStim Therapy.  I will place a new electrode and generator to the opposite side of the original InterStim Therapy to  see whether it will work better for her.  If her original device shows any problem such as requiring a high voltage to stimulate or impedence problem, I will replace the old one as well.  ( possible bilateral InterStim Therapy).    In the mean time, Continue flomax and increase it to BID.  Take Ativan nightly to relax her pelvic muscles to see whether it will improve her bladder symptoms.    I spent 40 minutes with the patient of which more than half was spent in direct consultation with the patient in regards to our treatment and plan.    Follow-up:  Follow up in about 6 days (around 7/13/2022), or InterStim Revision with bilateral electrode and generators implant.

## 2022-07-08 ENCOUNTER — TELEPHONE (OUTPATIENT)
Dept: UROLOGY | Facility: CLINIC | Age: 42
End: 2022-07-08
Payer: COMMERCIAL

## 2022-07-08 DIAGNOSIS — R33.9 INCOMPLETE BLADDER EMPTYING: Primary | ICD-10-CM

## 2022-07-08 NOTE — TELEPHONE ENCOUNTER
Incomplete bladder emptying  -     Case Request Operating Room: REVISION, NEUROSTIMULATOR, SACRAL, INSERTION, ELECTRODE LEAD, NEUROSTIMULATOR, SACRAL, PERCUTANEOUS  INTERSTIM X, INSERTION, ELECTRODE LEADS AND PULSE GENERATOR, NEUROSTIMULATOR, SACRAL  FLUORO < 1HR

## 2022-07-12 ENCOUNTER — PATIENT MESSAGE (OUTPATIENT)
Dept: UROLOGY | Facility: CLINIC | Age: 42
End: 2022-07-12
Payer: COMMERCIAL

## 2022-07-12 ENCOUNTER — ANESTHESIA EVENT (OUTPATIENT)
Dept: SURGERY | Facility: HOSPITAL | Age: 42
End: 2022-07-12
Payer: COMMERCIAL

## 2022-07-12 ENCOUNTER — TELEPHONE (OUTPATIENT)
Dept: UROLOGY | Facility: CLINIC | Age: 42
End: 2022-07-12
Payer: COMMERCIAL

## 2022-07-12 NOTE — PRE-PROCEDURE INSTRUCTIONS
PREOP INSTRUCTIONS:  No food,milk or milk products for 8 hours before surgery.  Clear liquids like water,gatorade,apple juice are allowed up until 2 hours before surgery.  Instructed to follow the surgeon's instructions if they differ from these.  Shower instructions as well as directions to the Surgery Center were given.  Encouraged to wear loose fitting,comfortable clothing.  Medication instructions for pm prior to and am of procedure reviewed.  Instructed to avoid taking vitamins,supplements,aspirin and ibuprofen the morning of surgery.    Patient denies any side effects or issues with anesthesia or sedation.

## 2022-07-12 NOTE — ANESTHESIA PREPROCEDURE EVALUATION
[facility] [address]  Anesthesia Pre-Operative Evaluation         Patient Name: Chelsea Oconnell  YOB: 1980  MRN: [id number]    SUBJECTIVE:     Pre-operative evaluation for Procedure(s) (LRB):  REVISION, NEUROSTIMULATOR, SACRAL (N/A)  INSERTION, ELECTRODE LEAD, NEUROSTIMULATOR, SACRAL, PERCUTANEOUS INTERSTIM X (N/A)  INSERTION, ELECTRODE LEADS AND PULSE GENERATOR, NEUROSTIMULATOR, SACRAL FLUORO < 1HR  (Bilateral)     07/12/2022    Chelsea Oconnell is a 41 y.o. female w/ SLE, Crohn's, insomnia, depression and incomplete bladder emptying. She is s/p sacral neurostimulator placement 7/2019. She now has recurrent incomplete bladder emptying and presents for the above procedure.    LDA: None documented.    Prev airway: None documented.     Drips: None documented.    Patient Active Problem List   Diagnosis    IC (interstitial cystitis)    Chronic bladder pain    OAB (overactive bladder)    Incomplete bladder emptying    Pelvic pain in female    Overactive bladder    Fibromyalgia    Mechanical breakdown of generator of implanted electronic neurostimulator       Review of patient's allergies indicates:   Allergen Reactions    Iodine and iodide containing products Rash and Swelling    Shellfish containing products Rash and Swelling    Sulfa (sulfonamide antibiotics) Rash and Swelling    Adhesive Hives     Had reaction to surgical tape after last procedure    Sertraline Hives    Xanax [alprazolam] Hives       Current Inpatient Medications:      No current facility-administered medications on file prior to encounter.     Current Outpatient Medications on File Prior to Encounter   Medication Sig Dispense Refill    gabapentin (NEURONTIN) 300 MG capsule Take 1 capsule (300 mg total) by mouth 3 (three) times daily. 90 capsule 11    hydrOXYchloroQUINE (PLAQUENIL) 200 mg tablet Take 200 mg by mouth once daily. TAKES MID DAY      Lactobacillus rhamnosus GG (CULTURELLE) 10 billion cell capsule  "Take 1 capsule by mouth once daily.      multivitamin-Ca-iron-minerals 27-0.4 mg Tab Take by mouth once daily.      omega-3 acid ethyl esters (LOVAZA) 1 gram capsule Take 1 g by mouth once daily.      tamsulosin (FLOMAX) 0.4 mg Cap Take 1 capsule (0.4 mg total) by mouth 2 (two) times a day. 180 capsule 3    venlafaxine (EFFEXOR-XR) 75 MG 24 hr capsule Take by mouth every morning.      amitriptyline (ELAVIL) 25 MG tablet Take 1 tablet (25 mg total) by mouth nightly as needed for Insomnia. One by mouth every evening at bedtime to help with bladder pain and sleeping 90 tablet 3    doxazosin (CARDURA) 1 MG tablet Take 1 tablet (1 mg total) by mouth every evening. 30 tablet 11    ESTRADIOL TRANSDERMAL PATCH TD Place onto the skin.      LORazepam (ATIVAN) 1 MG tablet Take 1 tablet (1 mg total) by mouth nightly as needed for Anxiety. Take 1 tablet by mouth 30 mins before the procedure 30 tablet 1    phenazopyridine (PYRIDIUM) 100 MG tablet Take 2 tablets (200 mg total) by mouth 3 (three) times daily as needed for Pain (bladder pain). 30 tablet 11    urinary bag-catheter (VAPRO PLUS INTERMITT CATHETER) 12 Fr- 8" Cmpk 1 Units by Misc.(Non-Drug; Combo Route) route 6 (six) times daily. 180 each 99       Past Surgical History:   Procedure Laterality Date    CYSTOSCOPY WITH HYDRODISTENSION AND BIOPSY OF BLADDER N/A 6/5/2019    Procedure: CYSTOSCOPY, WITH BLADDER HYDRODISTENSION AND BIOPSY;  Surgeon: Donny Calle MD;  Location: [facility] OR [address] [place] [address];  Service: Urology;  Laterality: N/A;  1 hour    DILATION OF URETHRA N/A 6/5/2019    Procedure: DILATION, URETHRA;  Surgeon: Donny Calle MD;  Location: [facility] OR [address] [place] [address];  Service: Urology;  Laterality: N/A;    FLUOROSCOPIC URODYNAMIC STUDY N/A 6/5/2019    Procedure: URODYNAMIC STUDY, FLUOROSCOPIC;  Surgeon: Donny Calle MD;  Location: [facility] OR [address] [place] [address];  Service: Urology;  Laterality: N/A;  1 hour    FLUOROSCOPY N/A 7/31/2019    Procedure: FLUOROSCOPY;  Surgeon: Donny CANO" MD Luc;  [facility];  Service: Urology;  Laterality: N/A;    HYSTERECTOMY      INSERTION OF SACRAL NEUROSTIMULATOR GENERATOR Left 8/14/2019    Procedure: INSERTION, PULSE GENERATOR, NEUROSTIMULATOR, SACRAL Stage2;  Surgeon: Donny Calle MD;  Location: [facility] OR 2ND FLR;  Service: Urology;  Laterality: Left;    INSERTION OF SACRAL NEUROSTIMULATOR, ELECTRODES, AND IMPLANTABLE PULSE GENERATOR (IPG) Right 7/31/2019    Procedure: INSERTION, ELECTRODE LEAD, NEUROSTIMULATOR, SACRAL;  Surgeon: Donny Calle MD;  Location: [facility] OR 2ND FLR;  Service: Urology;  Laterality: Right;    INSTILLATION OF URINARY BLADDER N/A 6/5/2019    Procedure: INSTILLATION, BLADDER;  Surgeon: Donny Calle MD;  Location: [facility] OR 1ST FLR;  Service: Urology;  Laterality: N/A;    liver      hemangioma removed       Social History     Socioeconomic History    Marital status:    Tobacco Use    Smoking status: Never Smoker    Smokeless tobacco: Never Used   Substance and Sexual Activity    Alcohol use: Never    Drug use: Never       OBJECTIVE:     Vital Signs Range (Last 24H):         CBC:   No results for input(s): WBC, RBC, HGB, HCT, PLT, MCV, MCH, MCHC in the last 72 hours.    CMP: No results for input(s): NA, K, CL, CO2, BUN, CREATININE, GLU, MG, PHOS, CALCIUM, ALBUMIN, PROT, ALKPHOS, ALT, AST, BILITOT in the last 72 hours.    INR:  No results for input(s): PT, INR, PROTIME, APTT in the last 72 hours.    Diagnostic Studies: No relevant studies.    EKG:   No results found for this or any previous visit.     2D ECHO:   No results found for this or any previous visit.         ASSESSMENT/PLAN:         Pre-op Assessment    I have reviewed the Patient Summary Reports.     I have reviewed the Nursing Notes. I have reviewed the NPO Status.   I have reviewed the Medications.     Review of Systems  Anesthesia Hx:  No problems with previous Anesthesia Denies Hx of Anesthetic complications  History of prior surgery of  interest to airway management or planning: Previous anesthesia: General Denies Family Hx of Anesthesia complications.   Denies Personal Hx of Anesthesia complications.   Social:  Non-Smoker, No Alcohol Use    Hematology/Oncology:         -- Denies Anemia: Denies Current/Recent Cancer   Cardiovascular:   Exercise tolerance: good Denies Hypertension.  Denies CAD.    Denies Dysrhythmias.   Denies CHF. no hyperlipidemia    Pulmonary:   Denies COPD.  Denies Asthma.  Denies Sleep Apnea.    Renal/:   Denies Chronic Renal Disease.  Incomplete bladder emptying    Hepatic/GI:   Denies GERD. Denies Liver Disease. Crohn's   Musculoskeletal:   Denies Arthritis.     Neurological:   Denies CVA. Denies Neuromuscular Disease.  Denies Seizures.   Denies Chronic Pain Syndrome   Endocrine:   Denies Diabetes. Denies Hyperthyroidism.  Denies Obesity / BMI > 30  Psych:   denies anxiety depression          Physical Exam  General: Well nourished, Cooperative, Alert and Oriented    Airway:  Mallampati: II   Mouth Opening: Normal  TM Distance: Normal  Tongue: Normal  Neck ROM: Normal ROM    Dental:  Intact        Anesthesia Plan  Type of Anesthesia, risks & benefits discussed:    Anesthesia Type: Gen ETT  Intra-op Monitoring Plan: Standard ASA Monitors  Post Op Pain Control Plan: multimodal analgesia and IV/PO Opioids PRN  Induction:  IV  Airway Plan: Direct, Post-Induction  Informed Consent: Informed consent signed with the Patient and all parties understand the risks and agree with anesthesia plan.  All questions answered.   ASA Score: 2  Day of Surgery Review of History & Physical: H&P Update referred to the surgeon/provider.    Ready For Surgery From Anesthesia Perspective.     .

## 2022-07-12 NOTE — TELEPHONE ENCOUNTER
Called pt to confirm arrival time of 1130am for procedure on 7-13. Gave pt NPO instructions and gave pt opportunity to ask questions. Pt verbalized understanding.     Pt was informed that only 1 person would be allowed to accompany them the morning of surgery.  Pt verbalized understanding.

## 2022-07-13 ENCOUNTER — ANESTHESIA (OUTPATIENT)
Dept: SURGERY | Facility: HOSPITAL | Age: 42
End: 2022-07-13
Payer: COMMERCIAL

## 2022-07-13 ENCOUNTER — HOSPITAL ENCOUNTER (OUTPATIENT)
Facility: HOSPITAL | Age: 42
Discharge: HOME | End: 2022-07-13
Attending: UROLOGY | Admitting: UROLOGY
Payer: COMMERCIAL

## 2022-07-13 ENCOUNTER — RESEARCH ENCOUNTER (OUTPATIENT)
Dept: RESEARCH | Facility: HOSPITAL | Age: 42
End: 2022-07-13

## 2022-07-13 ENCOUNTER — RESEARCH ENCOUNTER (OUTPATIENT)
Dept: RESEARCH | Facility: HOSPITAL | Age: 42
End: 2022-07-13
Payer: COMMERCIAL

## 2022-07-13 VITALS
WEIGHT: 154 LBS | DIASTOLIC BLOOD PRESSURE: 67 MMHG | OXYGEN SATURATION: 97 % | TEMPERATURE: 97 F | SYSTOLIC BLOOD PRESSURE: 114 MMHG | HEART RATE: 93 BPM | BODY MASS INDEX: 28.17 KG/M2 | RESPIRATION RATE: 16 BRPM

## 2022-07-13 DIAGNOSIS — N30.10 INTERSTITIAL CYSTITIS: Primary | ICD-10-CM

## 2022-07-13 LAB
CTP QC/QA: YES
SARS-COV-2 AG RESP QL IA.RAPID: NEGATIVE

## 2022-07-13 PROCEDURE — 64590 INS/RPL PRPH SAC/GSTR NPG/R: CPT | Mod: 51,,, | Performed by: UROLOGY

## 2022-07-13 PROCEDURE — 64581 PR IMPLANTATION, NEUROSTIM ELECT ARRAY, OPEN, SACRAL NERVE: ICD-10-PCS | Mod: ,,, | Performed by: UROLOGY

## 2022-07-13 PROCEDURE — 71000015 HC POSTOP RECOV 1ST HR: Performed by: UROLOGY

## 2022-07-13 PROCEDURE — C1778 LEAD, NEUROSTIMULATOR: HCPCS | Performed by: UROLOGY

## 2022-07-13 PROCEDURE — 63600175 PHARM REV CODE 636 W HCPCS: Performed by: NURSE ANESTHETIST, CERTIFIED REGISTERED

## 2022-07-13 PROCEDURE — C1787 PATIENT PROGR, NEUROSTIM: HCPCS | Performed by: UROLOGY

## 2022-07-13 PROCEDURE — 95971 PR ANALYZE NEUROSTIM,SIMPLE/PROG: ICD-10-PCS | Mod: ,,, | Performed by: UROLOGY

## 2022-07-13 PROCEDURE — 63600175 PHARM REV CODE 636 W HCPCS: Performed by: STUDENT IN AN ORGANIZED HEALTH CARE EDUCATION/TRAINING PROGRAM

## 2022-07-13 PROCEDURE — D9220A PRA ANESTHESIA: ICD-10-PCS | Mod: ANES,,, | Performed by: ANESTHESIOLOGY

## 2022-07-13 PROCEDURE — 37000008 HC ANESTHESIA 1ST 15 MINUTES: Performed by: UROLOGY

## 2022-07-13 PROCEDURE — D9220A PRA ANESTHESIA: ICD-10-PCS | Mod: CRNA,,, | Performed by: NURSE ANESTHETIST, CERTIFIED REGISTERED

## 2022-07-13 PROCEDURE — 64590 PR IMPLANT PERIPH/GASTRIC NEUROSTIM/RECEIVER: ICD-10-PCS | Mod: 51,,, | Performed by: UROLOGY

## 2022-07-13 PROCEDURE — 64581 OPN IMPLTJ NEA SACRAL NERVE: CPT | Mod: ,,, | Performed by: UROLOGY

## 2022-07-13 PROCEDURE — C1767 GENERATOR, NEURO NON-RECHARG: HCPCS | Performed by: UROLOGY

## 2022-07-13 PROCEDURE — 25000003 PHARM REV CODE 250: Performed by: STUDENT IN AN ORGANIZED HEALTH CARE EDUCATION/TRAINING PROGRAM

## 2022-07-13 PROCEDURE — D9220A PRA ANESTHESIA: Mod: CRNA,,, | Performed by: NURSE ANESTHETIST, CERTIFIED REGISTERED

## 2022-07-13 PROCEDURE — 36000706: Performed by: UROLOGY

## 2022-07-13 PROCEDURE — 25000003 PHARM REV CODE 250

## 2022-07-13 PROCEDURE — 71000044 HC DOSC ROUTINE RECOVERY FIRST HOUR: Performed by: UROLOGY

## 2022-07-13 PROCEDURE — 25000003 PHARM REV CODE 250: Performed by: NURSE ANESTHETIST, CERTIFIED REGISTERED

## 2022-07-13 PROCEDURE — 36000707: Performed by: UROLOGY

## 2022-07-13 PROCEDURE — 95971 ALYS SMPL SP/PN NPGT W/PRGRM: CPT | Mod: ,,, | Performed by: UROLOGY

## 2022-07-13 PROCEDURE — D9220A PRA ANESTHESIA: Mod: ANES,,, | Performed by: ANESTHESIOLOGY

## 2022-07-13 PROCEDURE — 71000045 HC DOSC ROUTINE RECOVERY EA ADD'L HR: Performed by: UROLOGY

## 2022-07-13 PROCEDURE — 25000003 PHARM REV CODE 250: Performed by: UROLOGY

## 2022-07-13 PROCEDURE — 63600175 PHARM REV CODE 636 W HCPCS

## 2022-07-13 PROCEDURE — 37000009 HC ANESTHESIA EA ADD 15 MINS: Performed by: UROLOGY

## 2022-07-13 DEVICE — LEAD INTERSTIM 2 SURESCAN 28CM: Type: IMPLANTABLE DEVICE | Site: BACK | Status: FUNCTIONAL

## 2022-07-13 DEVICE — SYS INTERSTIM X RECHARGE FREE: Type: IMPLANTABLE DEVICE | Site: BACK | Status: FUNCTIONAL

## 2022-07-13 RX ORDER — LIDOCAINE HYDROCHLORIDE 20 MG/ML
INJECTION, SOLUTION EPIDURAL; INFILTRATION; INTRACAUDAL; PERINEURAL
Status: DISCONTINUED | OUTPATIENT
Start: 2022-07-13 | End: 2022-07-13

## 2022-07-13 RX ORDER — EPHEDRINE SULFATE 50 MG/ML
INJECTION, SOLUTION INTRAVENOUS
Status: DISCONTINUED | OUTPATIENT
Start: 2022-07-13 | End: 2022-07-13

## 2022-07-13 RX ORDER — CEFAZOLIN SODIUM/D5W 2 G/100 ML
PLASTIC BAG, INJECTION (ML) INTRAVENOUS
Status: DISCONTINUED | OUTPATIENT
Start: 2022-07-13 | End: 2022-07-13

## 2022-07-13 RX ORDER — KETAMINE HCL IN 0.9 % NACL 50 MG/5 ML
SYRINGE (ML) INTRAVENOUS
Status: DISCONTINUED | OUTPATIENT
Start: 2022-07-13 | End: 2022-07-13

## 2022-07-13 RX ORDER — SODIUM CHLORIDE 0.9 % (FLUSH) 0.9 %
10 SYRINGE (ML) INJECTION
Status: DISCONTINUED | OUTPATIENT
Start: 2022-07-13 | End: 2022-07-13 | Stop reason: HOSPADM

## 2022-07-13 RX ORDER — PHENYLEPHRINE HCL IN 0.9% NACL 1 MG/10 ML
SYRINGE (ML) INTRAVENOUS
Status: DISCONTINUED | OUTPATIENT
Start: 2022-07-13 | End: 2022-07-13

## 2022-07-13 RX ORDER — ONDANSETRON 2 MG/ML
INJECTION INTRAMUSCULAR; INTRAVENOUS
Status: DISCONTINUED | OUTPATIENT
Start: 2022-07-13 | End: 2022-07-13

## 2022-07-13 RX ORDER — ROCURONIUM BROMIDE 10 MG/ML
INJECTION, SOLUTION INTRAVENOUS
Status: DISCONTINUED | OUTPATIENT
Start: 2022-07-13 | End: 2022-07-13

## 2022-07-13 RX ORDER — OXYCODONE HYDROCHLORIDE 5 MG/1
5 TABLET ORAL EVERY 4 HOURS PRN
Qty: 10 TABLET | Refills: 0 | Status: ON HOLD | OUTPATIENT
Start: 2022-07-13 | End: 2022-07-26 | Stop reason: SDUPTHER

## 2022-07-13 RX ORDER — SUCCINYLCHOLINE CHLORIDE 20 MG/ML
INJECTION INTRAMUSCULAR; INTRAVENOUS
Status: DISCONTINUED | OUTPATIENT
Start: 2022-07-13 | End: 2022-07-13

## 2022-07-13 RX ORDER — HYDROMORPHONE HYDROCHLORIDE 1 MG/ML
0.2 INJECTION, SOLUTION INTRAMUSCULAR; INTRAVENOUS; SUBCUTANEOUS
Status: DISCONTINUED | OUTPATIENT
Start: 2022-07-13 | End: 2022-07-13 | Stop reason: HOSPADM

## 2022-07-13 RX ORDER — FENTANYL CITRATE 50 UG/ML
INJECTION, SOLUTION INTRAMUSCULAR; INTRAVENOUS
Status: DISCONTINUED | OUTPATIENT
Start: 2022-07-13 | End: 2022-07-13

## 2022-07-13 RX ORDER — HALOPERIDOL 5 MG/ML
0.5 INJECTION INTRAMUSCULAR EVERY 10 MIN PRN
Status: DISCONTINUED | OUTPATIENT
Start: 2022-07-13 | End: 2022-07-13 | Stop reason: HOSPADM

## 2022-07-13 RX ORDER — DEXAMETHASONE SODIUM PHOSPHATE 4 MG/ML
INJECTION, SOLUTION INTRA-ARTICULAR; INTRALESIONAL; INTRAMUSCULAR; INTRAVENOUS; SOFT TISSUE
Status: DISCONTINUED | OUTPATIENT
Start: 2022-07-13 | End: 2022-07-13

## 2022-07-13 RX ORDER — CEFAZOLIN SODIUM/WATER 2 G/20 ML
2 SYRINGE (ML) INTRAVENOUS ONCE
Status: DISCONTINUED | OUTPATIENT
Start: 2022-07-13 | End: 2022-07-13 | Stop reason: HOSPADM

## 2022-07-13 RX ORDER — SODIUM CHLORIDE 9 MG/ML
INJECTION, SOLUTION INTRAVENOUS CONTINUOUS
Status: ACTIVE | OUTPATIENT
Start: 2022-07-13

## 2022-07-13 RX ORDER — PROPOFOL 10 MG/ML
VIAL (ML) INTRAVENOUS
Status: DISCONTINUED | OUTPATIENT
Start: 2022-07-13 | End: 2022-07-13

## 2022-07-13 RX ORDER — DIPHENHYDRAMINE HYDROCHLORIDE 50 MG/ML
25 INJECTION INTRAMUSCULAR; INTRAVENOUS EVERY 6 HOURS PRN
Status: DISCONTINUED | OUTPATIENT
Start: 2022-07-13 | End: 2022-07-13 | Stop reason: HOSPADM

## 2022-07-13 RX ORDER — LIDOCAINE HYDROCHLORIDE AND EPINEPHRINE 15; 5 MG/ML; UG/ML
INJECTION, SOLUTION EPIDURAL
Status: DISCONTINUED | OUTPATIENT
Start: 2022-07-13 | End: 2022-07-13 | Stop reason: HOSPADM

## 2022-07-13 RX ORDER — LIDOCAINE HYDROCHLORIDE 10 MG/ML
1 INJECTION, SOLUTION EPIDURAL; INFILTRATION; INTRACAUDAL; PERINEURAL ONCE
Status: ACTIVE | OUTPATIENT
Start: 2022-07-13

## 2022-07-13 RX ORDER — CEPHALEXIN 500 MG/1
500 CAPSULE ORAL EVERY 6 HOURS
Qty: 28 CAPSULE | Refills: 0 | Status: SHIPPED | OUTPATIENT
Start: 2022-07-13 | End: 2022-07-20

## 2022-07-13 RX ADMIN — EPHEDRINE SULFATE 10 MG: 50 INJECTION INTRAVENOUS at 02:07

## 2022-07-13 RX ADMIN — Medication 20 MG: at 01:07

## 2022-07-13 RX ADMIN — VANCOMYCIN HYDROCHLORIDE 1500 MG: 1.5 INJECTION, POWDER, LYOPHILIZED, FOR SOLUTION INTRAVENOUS at 11:07

## 2022-07-13 RX ADMIN — SODIUM CHLORIDE: 0.9 INJECTION, SOLUTION INTRAVENOUS at 01:07

## 2022-07-13 RX ADMIN — FENTANYL CITRATE 50 MCG: 50 INJECTION INTRAMUSCULAR; INTRAVENOUS at 03:07

## 2022-07-13 RX ADMIN — SODIUM CHLORIDE: 0.9 INJECTION, SOLUTION INTRAVENOUS at 11:07

## 2022-07-13 RX ADMIN — SODIUM CHLORIDE, SODIUM GLUCONATE, SODIUM ACETATE, POTASSIUM CHLORIDE, MAGNESIUM CHLORIDE, SODIUM PHOSPHATE, DIBASIC, AND POTASSIUM PHOSPHATE: .53; .5; .37; .037; .03; .012; .00082 INJECTION, SOLUTION INTRAVENOUS at 02:07

## 2022-07-13 RX ADMIN — PROPOFOL 150 MG: 10 INJECTION, EMULSION INTRAVENOUS at 01:07

## 2022-07-13 RX ADMIN — DIPHENHYDRAMINE HYDROCHLORIDE 25 MG: 50 INJECTION, SOLUTION INTRAMUSCULAR; INTRAVENOUS at 01:07

## 2022-07-13 RX ADMIN — PROPOFOL 10 MG: 10 INJECTION, EMULSION INTRAVENOUS at 03:07

## 2022-07-13 RX ADMIN — Medication 100 MCG: at 02:07

## 2022-07-13 RX ADMIN — EPHEDRINE SULFATE 10 MG: 50 INJECTION INTRAVENOUS at 04:07

## 2022-07-13 RX ADMIN — Medication 10 MG: at 02:07

## 2022-07-13 RX ADMIN — FENTANYL CITRATE 100 MCG: 50 INJECTION INTRAMUSCULAR; INTRAVENOUS at 01:07

## 2022-07-13 RX ADMIN — Medication 10 MG: at 03:07

## 2022-07-13 RX ADMIN — ONDANSETRON 4 MG: 2 INJECTION INTRAMUSCULAR; INTRAVENOUS at 04:07

## 2022-07-13 RX ADMIN — SUCCINYLCHOLINE CHLORIDE 140 MG: 20 INJECTION, SOLUTION INTRAMUSCULAR; INTRAVENOUS; PARENTERAL at 01:07

## 2022-07-13 RX ADMIN — DEXAMETHASONE SODIUM PHOSPHATE 4 MG: 4 INJECTION INTRA-ARTICULAR; INTRALESIONAL; INTRAMUSCULAR; INTRAVENOUS; SOFT TISSUE at 02:07

## 2022-07-13 RX ADMIN — Medication 100 MCG: at 01:07

## 2022-07-13 RX ADMIN — ROCURONIUM BROMIDE 5 MG: 10 INJECTION INTRAVENOUS at 01:07

## 2022-07-13 RX ADMIN — Medication 2 G: at 02:07

## 2022-07-13 RX ADMIN — LIDOCAINE HYDROCHLORIDE 100 MG: 20 INJECTION, SOLUTION EPIDURAL; INFILTRATION; INTRACAUDAL at 01:07

## 2022-07-13 RX ADMIN — FENTANYL CITRATE 50 MCG: 50 INJECTION INTRAMUSCULAR; INTRAVENOUS at 04:07

## 2022-07-13 NOTE — OP NOTE
[facility]  Operative Note    Date: 07/13/2022    Pre-Op Diagnosis: urinary urgency, frequency, urge urinary incontinence, Pelvis Pain/IC, incomplete bladder emptying, non-obstructive urinary retention.   Past Medical History:   Diagnosis Date    Crohn's colitis     Lupus (systemic lupus erythematosus)          Post-Op Diagnosis: same    Procedure(s) Performed:   1.  Incision for implantation of neurostimulator electrodes, sacral nerve (transforamenal placement) Bilateral  2.  Insertion of peripheral neurostimulator pulse generator (Bilateral)  3.  Fluoro < 1 h  4.  Electronic analysis of implanted neurostimulator pulse generator system with intraoperative programming  5.  Removal of previous interstim (right lead and left IPG)    Specimen(s): none    Staff Surgeon: Donny Calle MD    Assistant Surgeon: Akil Rosen MD    Anesthesia: General endotracheal intubation    Indications: Chelsea Oconnell is a 41 y.o. female with refractory UMESH, non-obstructive UR, urgency, frequency and urge urinary incontinence, s/p interstim therapy in 2019 with moderate improvement, IC/pelvic pain.  This has been refractory to medical management.  The patient has decided to pursue repeat neuromodulation therapy.      Findings:   Lead placed on left and right side ( both new InterStim electrodes)  Generator placed on left and right side ( the new InterStim X on the left side, the old InterStim generator removed from previous left side and placed on the right side)  All systems are now MRI compatible.  The old InterStim generator had 2 to 4 years of battery life left and reused on the right side.  Good sensory and motor function c/w S3 stimulation seen  The rolling pen technique was used to localize S3  Previous interstim device with lead on right and IPG on left removed.     Estimated Blood Loss: min    Drains: none    Procedure in Detail:  After informed consent was obtained, the patient was brought to the  operating suite and placed in the prone position.  Pillows were placed under lower abdomen to flatten sacrum and under shins to allow the toes to dangle freely.  MAC anesthesia was administered. The patient's back, buttocks and anus were prepped and draped in the usual sterile fashion.  Timeout was performed and pre-operative antibiotics were confirmed.      The C-arm was draped and moved into AP position to provide fluoroscopic mapping of the sacral region.  A pen was allowed to roll in the midline of the sacrum until it balanced horizontally.  The fulcrum point was marked as the level of S3.  A point 2 cm lateral and 2 cm superior to the marked S3 level was marked as the entry point for the foramen needle.  1% lidocaine with epinephrine was injected into this area where the finder needles were to be placed.  A foramen needle was introduced on the left side until the S3 foramen was identified and penetrated.  The depth of the needle was confirmed and adjusted fluoroscopically.  Proper needle position was confirmed by patient identification of location of sensation, direct observation of the lifting of the perineum and observation of plantar flexion of the great toe utilizing the external test stimulator.  The foramen needle stylet was removed and a directional guide was placed and confirmed fluoroscopically.  The needle was removed keeping the directional guide in place.  An incision was made peripherally to the directional guide through the fascial layer.  The lead introducer sheath with dilator was placed over the directional guide and directed into the foramen ensuring the radiopaque marker did not extend beyond the anterior edge of the sacrum.  The dilator was unlocked and removed along with the directional guide keeping the introducer sheath in place.     The lead was then placed through the introducer sheath to the first white line.  Position was checked fluoroscopically.  The lead was then further introduced  until 3 electrodes were visible below the sacrum.  Each electrode was tested for location of patient sensation, visualization of tae, and plantar flexion of the great toe.  After satisfactory positioning was confirmed, the introducer sheath was retracted under continuous fluoro, deploying lead tines into the perisacral tissue.      The previous IPG incision on the left was infiltrated with local anesthetic with 1% lidocaine with epinephrine     A 4 cm incision was marked over the subcutaneous tissue posterior to the left iliac crest and lateral to the sacrum. A 15 blade was used to sharply incise the marked incision. The previous IPG was delivered through the incision and unscrewed from the indwelling tined lead. The lead was tugged to pinpoint an indentation on the sacrum marking the entry point of the old lead into the right S3 foramen.   At this point we extended the sacral incision and dissected down to the fascia in order to deliver the lead. The lead was delivered with a right angle clamp and the kay was removed in his entirety.     The tunneling tool with sheath was placed from the new lead exit site (left S3 lead) subcutaneously to the incised pocket site. The tunneling tool was removed and the lead was fed through the sheath and pulled out at the pocket site. The lead was cleansed of bodily fluids and dried. The lead was inserted into the neurostimulator and the metal bands were aligned with the blue lead tip clearly visible in the distal portion of the header. The single setscrew was tightened with the hex wrench.  The neurostimulator was delivered into the subcutaneous pocket with the etched identification side placed upwards and the excessive lead wrapped around the neurostimulator.     We then focused on placing another new lead into the right S3 foramen. A foramen needle was introduced on the right side until the S3 foramen was identified and penetrated.  The depth of the needle was confirmed and  adjusted fluoroscopically.  Proper needle position was confirmed by patient identification of location of sensation, direct observation of the lifting of the perineum and observation of plantar flexion of the great toe utilizing the external test stimulator.  The foramen needle stylet was removed and a directional guide was placed and confirmed fluoroscopically.  The needle was removed keeping the directional guide in place.  An incision was made peripherally to the directional guide through the fascial layer.  The lead introducer sheath with dilator was placed over the directional guide and directed into the foramen ensuring the radiopaque marker did not extend beyond the anterior edge of the sacrum.  The dilator was unlocked and removed along with the directional guide keeping the introducer sheath in place.     The lead was then placed through the introducer sheath to the first white line.  Position was checked fluoroscopically.  The lead was then further introduced until 3 electrodes were visible below the sacrum.  Each electrode was tested for location of patient sensation, visualization of tae, and plantar flexion of the great toe.  After satisfactory positioning was confirmed, the introducer sheath was retracted under continuous fluoro, deploying lead tines into the perisacral tissue.      A 4cm incision over the right buttock was marked and infiltrated with local anesthetic. This was incised with a 15 blade and Bovie until we were at the level of the gluteal fascia. An IPG pocket was made bluntly.   The tunneling tool with sheath was placed from the right lead exit site subcutaneously to the incised pocket site on the right. The tunneling tool was removed and the lead was fed through the sheath and pulled out at the pocket site. The lead was cleansed of bodily fluids and dried. The lead was inserted into the neurostimulator IPG and the metal bands were aligned with the blue lead tip clearly visible in  the distal portion of the header. The single setscrew was tightened with the hex wrench.  The neurostimulator was delivered into the subcutaneous pocket with the etched identification side placed upwards and the excessive lead wrapped around the neurostimulator.     At this point the left tined lead was connected to the interstim X neurostimulator and the battery was buried into the pocket.     The programming head was placed over the implanted neurostimulator bilaterally in a sterile cover to ensure adequate lead connection and that parameters were within normal limits. Impedances were confirmed to be within normal limits.      The lateral supragluteal incisions were then closed using deep dermal 3-0 vicryl simple interrupted sutures followed by a 4-0 monocryl subcuticular suture.      The midline incision was closed similarly. Dermabond was applied to the incisions.     The patient tolerated the procedure well and was transferred to the recovery room in stable condition.      Disposition:  The patient will meet with the [email or web address] rep in the recovery room for instructions on programming the stimulator.  The patient was given prescriptions for keflex and analgesia and will follow up with Dr. Calle in 4-6 weeks.      Akil Rosen MD

## 2022-07-13 NOTE — PROGRESS NOTES
[employer] PSR Regisrty Study  IRB# [id number]  PI: Dr. Donny Calle    Subject # : [id number]  Date: July 13,2022    Visit :CONSENT    The study staff was contacted by Dr. Calle to meet with Mrs. Castillo discuss participation in the [employer] PSR Registry study.  A brief description of the study had already provided to the patient by Dr. Calle.     A full review of the consent document was immediately performed. Opportunity for questions was available throughout the discussion.  All questions were answered to the patient's satisfaction. After the review of the consent document the patient verbalized an understanding of the study and a willingness to participate. The consent document was then signed. The consent contains information regarding the release of HIPAA protected information for the purposes of research.  A copy of the signed document was mailed to the patient along with the contact information for the study staff. The patient was encouraged to call our offices if she  should have any questions regarding the study or her participation. No study activities were conducted prior to consent.

## 2022-07-13 NOTE — TRANSFER OF CARE
Anesthesia Transfer of Care Note    Patient: Chelsea Oconnell    Procedure(s) Performed: Procedure(s) (LRB):  REVISION, NEUROSTIMULATOR, SACRAL (Right)  INSERTION, ELECTRODE LEAD, NEUROSTIMULATOR, SACRAL, PERCUTANEOUS INTERSTIM X (Left)    Patient location: PACU    Anesthesia Type: general    Transport from OR: Transported from OR on 6-10 L/min O2 by face mask with adequate spontaneous ventilation    Post pain: adequate analgesia    Post assessment: no apparent anesthetic complications and tolerated procedure well    Post vital signs: stable    Level of consciousness: sedated    Nausea/Vomiting: no nausea/vomiting    Complications: none    Transfer of care protocol was followed      Last vitals:   Visit Vitals  /78 (BP Location: Right arm, Patient Position: Lying)   Pulse 67   Temp 36.6 °C (97.8 °F) (Oral)   Resp 18   Wt 69.9 kg (154 lb)   SpO2 100%   Breastfeeding No   BMI 28.17 kg/m²

## 2022-07-13 NOTE — PLAN OF CARE
Discharge instructions given to patient and spouse. Copies provided. Both verbalized understanding. VSS. No c/o pain or nausea. No s/s of distress noted. MD spoke with patient and spouse at bedside. Surgical site C/D/I. Patient meets criteria for discharge. Patient taken to vehicle via wc.

## 2022-07-13 NOTE — PLAN OF CARE
Pt prepped for surgery. Pt aware urine sample needed prior to surgery but is currently unable to go. Pt to notify nurse when she needs to urinate - specimen cup at bedside. Clothing placed in locker,  at bedside and to take patient's purse.

## 2022-07-13 NOTE — ANESTHESIA PROCEDURE NOTES
Intubation    Date/Time: 7/13/2022 1:52 PM  Performed by: Eddie Lebron MD  Authorized by: Rafaela Keith MD     Intubation:     Induction:  Intravenous    Intubated:  Postinduction    Mask Ventilation:  Easy mask    Attempts:  1    Attempted By:  Resident anesthesiologist    Method of Intubation:  Direct    Blade:  Ramos 2    Laryngeal View Grade: Grade IIA - cords partially seen      Difficult Airway Encountered?: No      Complications:  None    Airway Device:  Oral endotracheal tube    Airway Device Size:  7.0    Style/Cuff Inflation:  Cuffed (inflated to minimal occlusive pressure)    Tube secured:  21    Secured at:  The teeth    Placement Verified By:  Capnometry    Complicating Factors:  None    Findings Post-Intubation:  BS equal bilateral and atraumatic/condition of teeth unchanged

## 2022-07-13 NOTE — PROGRESS NOTES
1245 pt called stated she is feeling hot, itching, and having redness on her neck/face. Pt denied any difficulty breathing or shortness of breath. Stopped vancomycin and flushed IV line.  Urology resident Dr. Okeefe notified. Dr. Okeefe assessed patient, discontinued vancomycin, and ordered benadryl. Administered benadryl per MAR. Pt states she felt better after vancomycin discontinued.

## 2022-07-13 NOTE — DISCHARGE SUMMARY
[facility]  Discharge Note  Short Stay    Admit Date: 7/13/2022    Discharge Date and Time: 07/13/2022 4:33 PM      Attending Physician: Donny Calle MD     Discharge Provider: Akil Rosen MD    Diagnoses:  There are no hospital problems to display for this patient.      Discharged Condition: stable    Hospital Course: Patient was admitted for bilateral completion interstim therapy and tolerated the procedure well with no complications. She was discharged home in good condition on the same day.       Final Diagnoses: Same as principal problem.    Disposition: Home or Self Care    Follow up/Patient Instructions:    Medications:  Reconciled Home Medications:   Current Discharge Medication List      START taking these medications    Details   cephALEXin (KEFLEX) 500 MG capsule Take 1 capsule (500 mg total) by mouth every 6 (six) hours. for 7 days  Qty: 28 capsule, Refills: 0      oxyCODONE (OXY-IR) 5 mg Cap Take 1 capsule (5 mg total) by mouth every 4 (four) hours as needed for Pain.  Qty: 10 capsule, Refills: 0    Comments: Quantity prescribed more than 7 day supply? No         CONTINUE these medications which have NOT CHANGED    Details   amitriptyline (ELAVIL) 25 MG tablet Take 1 tablet (25 mg total) by mouth nightly as needed for Insomnia. One by mouth every evening at bedtime to help with bladder pain and sleeping  Qty: 90 tablet, Refills: 3    Associated Diagnoses: IC (interstitial cystitis)      ESTRADIOL TRANSDERMAL PATCH TD Place onto the skin.      gabapentin (NEURONTIN) 300 MG capsule Take 1 capsule (300 mg total) by mouth 3 (three) times daily.  Qty: 90 capsule, Refills: 11    Associated Diagnoses: Pelvic pain in female      hydrOXYchloroQUINE (PLAQUENIL) 200 mg tablet Take 200 mg by mouth once daily. TAKES MID DAY      Lactobacillus rhamnosus GG (CULTURELLE) 10 billion cell capsule Take 1 capsule by mouth once daily.      LORazepam (ATIVAN) 1 MG tablet Take 1 tablet (1 mg total) by mouth  "nightly as needed for Anxiety. Take 1 tablet by mouth 30 mins before the procedure  Qty: 30 tablet, Refills: 1    Associated Diagnoses: Pelvic pain in female      multivitamin-Ca-iron-minerals 27-0.4 mg Tab Take by mouth once daily.      omega-3 acid ethyl esters (LOVAZA) 1 gram capsule Take 1 g by mouth once daily.      phenazopyridine (PYRIDIUM) 100 MG tablet Take 2 tablets (200 mg total) by mouth 3 (three) times daily as needed for Pain (bladder pain).  Qty: 30 tablet, Refills: 11    Associated Diagnoses: Pelvic pain      tamsulosin (FLOMAX) 0.4 mg Cap Take 1 capsule (0.4 mg total) by mouth 2 (two) times a day.  Qty: 180 capsule, Refills: 3    Associated Diagnoses: OAB (overactive bladder); Pelvic pain; Incomplete bladder emptying      venlafaxine (EFFEXOR-XR) 75 MG 24 hr capsule Take by mouth every morning.      doxazosin (CARDURA) 1 MG tablet Take 1 tablet (1 mg total) by mouth every evening.  Qty: 30 tablet, Refills: 11    Associated Diagnoses: Incomplete bladder emptying      urinary bag-catheter (VAPRO PLUS INTERMITT CATHETER) 12 Fr- 8" Cmpk 1 Units by Misc.(Non-Drug; Combo Route) route 6 (six) times daily.  Qty: 180 each, Refills: 99    Associated Diagnoses: Incomplete bladder emptying           Discharge Procedure Orders   Activity as tolerated      Follow-up Information     Donny RACHAEL Calle MD Follow up in 4 week(s).    Specialty: Urology  Contact information:  [address] [place] [address] [id number] [phone]                         "

## 2022-07-13 NOTE — PROGRESS NOTES
[employer] PSR Registry Study  IRB# 2014.240  PI: Dr. Donny Calle    Subject #: [id number]    Date: July 13, 2022    Visit: Enrollment and Baseline    Mrs. Oconnell agreed and consented to participate in the [employer] PSR Registry Study. After signing consent, the following procedures were recorded:    1. Patient demographics   2. Eligibility criteria was confirmed with Dr. Calle.  3. Patient's education level - not done  4. Insurance payer information  5. A physical exam was performed by Dr. Calle  6. Therapy relevant medical history  7. General medical history  8. SNM Device history- Patient already has a Medtronic device and is undergoing a revision. She may end up with bilateral devices.   9. OAB questionnaire completed.

## 2022-07-13 NOTE — PATIENT INSTRUCTIONS
Interstim Post-Operative Care    You play an important role in your own recovery. You will be given a daily diary to document the  effectiveness of the Interstim implant.     Caring for Your Wound   After surgery you will have a dressing over the surgical site.  Do not remove or change the dressing. If your dressing becomes saturated or undone, you  may reinforce it with more tape and/or gauze but you should call the office to be evaluated.    Post Surgical Pain Management   It is normal to experience some discomfort post operatively, however the stimulation should not  be painful.   Take Tylenol 650mg 1-2 tabs every 4-6 hours as needed if you feel tenderness from surgical  incision (Do not to exceed 4000mg daily) or Motrin 200mg 1-2 tabs every 4-6 hours.  Warning: Do not take additional Tylenol while taking Percocet or Vicodin. All of these  products contain Acetaminophen, which can be toxic if taken in excess.    Stimulation   Stimulation is the pulling or tingling sensation felt in the pelvic area (near the vagina, scrotum  or anal area.) It should not be painful. If it is painful or you feel the stimulation sensation move  down the legs decrease the stimulation and call the office and speak to a nurse.   During the trial period (stage 1) you may notice that the Interstim device has improved your  continence which means it is working and on the correct setting. If you are having episodes of  incontinence you will need to increase your device setting by moving the dial up slowly.    Activity   Avoid heavy lifting or strenuous activity for 14 days after your surgery.   Frontal Showers or Sponge bath only for two weeks after each surgery. Avoid immersion in any  water until after your post-op appointment.    Symptoms to Report   Severe or worsening pain, unrelieved by pain medications.   Fever, greater than 101.5ºF, chills or sweats   Painful or problems urinating   Any problems with the device    If you  experience any of the above symptoms, call the [facility] clinic at [phone] during business hours on weekdays. If after hours or on weekends, call [phone] and ask to speak with the urology resident on call.    You may also call the [employer] Patient Help Line for specific help troubleshooting your device.  For help with the temporary implant Call [phone].  For help with the permanent implant Call [phone].

## 2022-07-13 NOTE — H&P
CC: incomplete bladder emptying    Chelsea Oconnell is a 41 y.o. woman who is here for a revision and possible replacement versus bilateral SNM therapy.   She has to do self intermittent catheterization in order to empty her bladder.    C/o incomplete bladder emptying, difficult urination, and pelvic pain.  She spends majority of time in the bathroom because of urinary problems.  Used to urinate every 30 mins or less, and catheterize herself.  Now a days, she is not able to void on her own and has to catheterize all the time.  Some residual urine noted but still feels that she did not empty her bladder bladder.    s/p InterStim therapy for Incomplete bladder emptying and OAB on 7/31/19.  She reports that InterStim test stimulation improved her voiding function significantly.  However, nowadays, she does not feel the proper stimulation from InterStim Therapy.    Initially saw her on 5/7/19 by her urologist in [place], [place] for refractory IC.  She lives in [place], [place] and has seen many urologists over the years.  She presents with at least 5 year hx of refractory bladder and pelvic pain with frequency, urgency, difficulty of emptying bladder.  Underwent many treatment for her urinary problems but with minimal improvement.  C/o constant pain in the bladder and pelvic area.  She wishes to get her bladder removed but her urologist in [place] did not do such surgery and refer her to me.  Had botox injection to the bladder but developed urinary retention and has done CIC for 1 year.  Now she is able to urinate but only small volume at a time with straining.    Urination symptoms prior to InterStim Therapy: Positive for frequency, urgency, nocturia and incomplete emptying, straining.  Denies flank pain     Had 5 c-sections, 4 to 5 Gyn surgeries including hysterectomy ( no malignancy).  S/p removal of liver hemangioma.    So underwent FUDS and cysto under anesthesia on 6/5/19     Procedure Date:   06/05/2019  Pre-OP Diagnosis:   Difficulty in voiding, frequency and urgency, intermittency, incomplete bladder emptying, urethral / bladder pain  Post-OP Diagnosis:   Same, pelvic floor dysfunction  Procedure:   Complex Cystometrogram   Voiding / Pressure Study with Intrarectal Balloon   Complex Uroflow   Electromyogram of Anal Sphincter.   Fluoroscopy less than 1 hour  Findings:   Initial Uroflow study: voided 20 ml, PVR 0 ml, Qmax 2 ml/sec  --- Bladder ---   CYSTOMETROGRAM ( Filling Phase ):   Cystometric Numeric Data:   - First Desire (Sensation): 9 mL at 1 cm of water pressure.   - Normal Desire: 18 mL at 1 cm of water.   - Strong Desire: 31 mL at 1 cm of water.   - Urgency (Imminent Void) : 48 mL at 19 cm of water.   - Maximum Cystometric Capacity: 85 mL at 25 cm of water pressure.   Compliance:   - low.   Leak Point Pressure:   - Valsalva ( Abdominal ) Leak Point Pressure: none.   UROFLOW:   - Voided Volume: unable to void, catheter removed but still unable to void.  At the end, she uses a bathroom, urinated 100 mL.   - Residual Urine: more than 100 mL.   - Maximum Flow Rate: n/a mL/sec.   - Flow Pattern: n/a but suspect an intermittent, abdominal straining.   VOIDING PRESSURE STUDY ( Voiding Phase ):   Detrusor Pressure:   - Maximum Detrusor Pressure: na.   - Detrusor Pressure at Maximum Flow: na.   - Detrusor Contraction Characteristics: na .   ELECTROMYOGRAM:   - unable to relax at the time of voiding  CONCLUSIONS:   1. Pelvic floor dysfunction  2. Chronic pelvic pain  3. Incomplete bladder emptying     Proceed with cysto under anesthesia with hydrodistention, urethral dilation.  Consider Physical Therapy, flomax and valium, possible InterStim Therapy    Procedure(s) Performed: 6/5/19  1.  Cystoscopy with hydrodistension  2.  Urethral dilation  3.  Bladder instillation  Findings:   - 1L anesthetic bladder capacity  - Diffuse bladder erythema, minimal glomerulations, no Hunner lesions  - Urethra dilated to  32 Fr with urethral sounds    Since her cystoscopic surgery, she is able to void somewhat better on flomax.  Was unable to undergo physical therapy due to her insurance coverage.  Vaginal suppository somewhat helpful, but is not able to use valium into the vagina due to recurrent yeast infection.  She has taken valium orally at night which has helped her.  Has been taking ibuprofen 200 mg 3 to 4 pills at a time 2 to 3 x a day for chronic pelvic pain.    C/o constant pelvic and urethral pain regardless whether her bladder is full or not.  She reports that she does not feel emptying her bladder completely.  She voids at least every 1 hour.  Has been on Uribel at least every other day due to her chronic bladder pain.    She did physical therapy in [place] in the past and noted minimal improvement.  [place] which made her symptoms worse.      Past Medical History:   Diagnosis Date    Crohn's colitis     Lupus (systemic lupus erythematosus)      Past Surgical History:   Procedure Laterality Date    CYSTOSCOPY WITH HYDRODISTENSION AND BIOPSY OF BLADDER N/A 6/5/2019    Procedure: CYSTOSCOPY, WITH BLADDER HYDRODISTENSION AND BIOPSY;  Surgeon: Donny Calle MD;  Location: [facility] OR [address] [place] [address];  Service: Urology;  Laterality: N/A;  1 hour    DILATION OF URETHRA N/A 6/5/2019    Procedure: DILATION, URETHRA;  Surgeon: Donny Calle MD;  [facility];  Service: Urology;  Laterality: N/A;    FLUOROSCOPIC URODYNAMIC STUDY N/A 6/5/2019    Procedure: URODYNAMIC STUDY, FLUOROSCOPIC;  Surgeon: Donny Calle MD;  [facility];  Service: Urology;  Laterality: N/A;  1 hour    FLUOROSCOPY N/A 7/31/2019    Procedure: FLUOROSCOPY;  Surgeon: Donny Calle MD;  Location: [facility] OR 2ND FLR;  Service: Urology;  Laterality: N/A;    HYSTERECTOMY      INSERTION OF SACRAL NEUROSTIMULATOR GENERATOR Left 8/14/2019    Procedure: INSERTION, PULSE GENERATOR, NEUROSTIMULATOR, SACRAL  Stage2;  Surgeon: Donny Calle MD;  Location: [facility] OR 2ND FLR;  Service: Urology;  Laterality: Left;    INSERTION OF SACRAL NEUROSTIMULATOR, ELECTRODES, AND IMPLANTABLE PULSE GENERATOR (IPG) Right 7/31/2019    Procedure: INSERTION, ELECTRODE LEAD, NEUROSTIMULATOR, SACRAL;  Surgeon: Donny Calle MD;  Location: [facility] OR 2ND FLR;  Service: Urology;  Laterality: Right;    INSTILLATION OF URINARY BLADDER N/A 6/5/2019    Procedure: INSTILLATION, BLADDER;  Surgeon: Donny Calle MD;  Location: [facility] OR 1ST FLR;  Service: Urology;  Laterality: N/A;    liver      hemangioma removed     Social History     Tobacco Use    Smoking status: Never Smoker    Smokeless tobacco: Never Used   Substance Use Topics    Alcohol use: Never    Drug use: Never     History reviewed. No pertinent family history.  Allergy:  Review of patient's allergies indicates:   Allergen Reactions    Iodine and iodide containing products Rash and Swelling    Shellfish containing products Rash and Swelling    Sulfa (sulfonamide antibiotics) Rash and Swelling    Adhesive Hives     Had reaction to surgical tape after last procedure    Sertraline Hives    Xanax [alprazolam] Hives     Outpatient Encounter Medications as of 7/7/2022   Medication Sig Dispense Refill    amitriptyline (ELAVIL) 25 MG tablet Take 1 tablet (25 mg total) by mouth nightly as needed for Insomnia. One by mouth every evening at bedtime to help with bladder pain and sleeping 90 tablet 3    calcium glycerophosphate (PRELIEF ORAL) Take 2 mg by mouth once daily.      cyanocobalamin (VITAMIN B-12) 1000 MCG tablet Take 100 mcg by mouth every 12 (twelve) hours.      doxazosin (CARDURA) 1 MG tablet Take 1 tablet (1 mg total) by mouth every evening. 30 tablet 11    estradiol (ESTRACE) 2 MG tablet       gabapentin (NEURONTIN) 300 MG capsule Take 1 capsule (300 mg total) by mouth 3 (three) times daily. 90 capsule 11    hydrOXYchloroQUINE (PLAQUENIL) 200 mg tablet Take 200 mg by  "mouth once daily.      ibuprofen (ADVIL,MOTRIN) 200 MG tablet Take 200 mg by mouth every 6 (six) hours as needed for Pain.      Lactobacillus rhamnosus GG (CULTURELLE) 10 billion cell capsule Take 1 capsule by mouth once daily.      LORazepam (ATIVAN) 1 MG tablet Take 1 tablet (1 mg total) by mouth nightly as needed for Anxiety. Take 1 tablet by mouth 30 mins before the procedure 30 tablet 1    multivitamin-Ca-iron-minerals 27-0.4 mg Tab Take by mouth.      omega-3 acid ethyl esters (LOVAZA) 1 gram capsule Take 1 g by mouth once daily.      phenazopyridine (PYRIDIUM) 100 MG tablet Take 2 tablets (200 mg total) by mouth 3 (three) times daily as needed for Pain (bladder pain). 30 tablet 11    tamsulosin (FLOMAX) 0.4 mg Cap Take 1 capsule (0.4 mg total) by mouth 2 (two) times a day. 180 capsule 3    urinary bag-catheter (VAPRO PLUS INTERMITT CATHETER) 12 Fr- 8" Cmpk 1 Units by Misc.(Non-Drug; Combo Route) route 6 (six) times daily. 180 each 99    venlafaxine (EFFEXOR-XR) 75 MG 24 hr capsule       [DISCONTINUED] tamsulosin (FLOMAX) 0.4 mg Cap Take 1 capsule (0.4 mg total) by mouth every evening. 90 capsule 3     No facility-administered encounter medications on file as of 7/7/2022.     Review of Systems   ROS  Physical Exam     Vitals:    07/13/22 1205   BP: 125/78   Pulse: 67   Resp: 18   Temp: 97.8 °F (36.6 °C)     Physical Exam  Constitutional:       General: She is not in acute distress.     Appearance: She is well-developed. She is not diaphoretic.   HENT:      Head: Normocephalic and atraumatic.      Right Ear: External ear normal.      Left Ear: External ear normal.      Nose: Nose normal.   Eyes:      General: No scleral icterus.     Conjunctiva/sclera: Conjunctivae normal.      Pupils: Pupils are equal, round, and reactive to light.   Neck:      Thyroid: No thyromegaly.      Vascular: No JVD.      Trachea: No tracheal deviation.   Cardiovascular:      Rate and Rhythm: Normal rate and regular rhythm. "      Heart sounds: Normal heart sounds. No murmur heard.    No friction rub. No gallop.   Pulmonary:      Effort: Pulmonary effort is normal. No respiratory distress.      Breath sounds: Normal breath sounds. No wheezing.   Abdominal:      General: Bowel sounds are normal. There is no distension.      Palpations: Abdomen is soft. There is no mass.      Tenderness: There is no abdominal tenderness. There is no guarding or rebound.   Genitourinary:     Vagina: Normal. No vaginal discharge.   Musculoskeletal:         General: No tenderness or deformity. Normal range of motion.      Cervical back: Normal range of motion and neck supple.   Lymphadenopathy:      Cervical: No cervical adenopathy.   Skin:     General: Skin is warm and dry.   Neurological:      Mental Status: She is alert and oriented to person, place, and time.   Psychiatric:         Behavior: Behavior normal.         Thought Content: Thought content normal.         LABS:  Lab Results   Component Value Date    CREATININE 0.8 05/07/2019     Urine Culture, Routine   Date Value Ref Range Status   06/02/2020 No growth  Final       Assessment and Plan:  Diagnoses and all orders for this visit:    Incomplete bladder emptying    OAB (overactive bladder)    Pelvic pain    Incomplete emptying of bladder    Mechanical breakdown of generator of implanted electronic neurostimulator, initial encounter    Initial successful response from InterStim therapy.  She is able to void on her own.  Now she does not feel the proper stimulation.  To OR today for revision interstim, possible bilateral InterStim.     Follow-up:  No follow-ups on file.

## 2022-07-14 ENCOUNTER — OFFICE VISIT (OUTPATIENT)
Dept: UROLOGY | Facility: CLINIC | Age: 42
End: 2022-07-14
Payer: COMMERCIAL

## 2022-07-14 ENCOUNTER — PATIENT MESSAGE (OUTPATIENT)
Dept: UROLOGY | Facility: CLINIC | Age: 42
End: 2022-07-14

## 2022-07-14 DIAGNOSIS — M79.7 FIBROMYALGIA MUSCLE PAIN: ICD-10-CM

## 2022-07-14 DIAGNOSIS — G89.18 ACUTE POST-OPERATIVE PAIN: Primary | ICD-10-CM

## 2022-07-14 DIAGNOSIS — M79.7 FIBROMYALGIA: ICD-10-CM

## 2022-07-14 DIAGNOSIS — R33.9 INCOMPLETE BLADDER EMPTYING: ICD-10-CM

## 2022-07-14 DIAGNOSIS — R10.2 PELVIC PAIN IN FEMALE: ICD-10-CM

## 2022-07-14 DIAGNOSIS — G89.18 POST-OP PAIN: Primary | ICD-10-CM

## 2022-07-14 PROCEDURE — 99499 UNLISTED E&M SERVICE: CPT | Mod: 95,,, | Performed by: UROLOGY

## 2022-07-14 PROCEDURE — 99499 NO LOS: ICD-10-PCS | Mod: 95,,, | Performed by: UROLOGY

## 2022-07-14 RX ORDER — TRAMADOL HYDROCHLORIDE 50 MG/1
50 TABLET ORAL EVERY 4 HOURS PRN
Qty: 30 TABLET | Refills: 0 | Status: SHIPPED | OUTPATIENT
Start: 2022-07-14 | End: 2023-07-25

## 2022-07-14 RX ORDER — GABAPENTIN 300 MG/1
300 CAPSULE ORAL 3 TIMES DAILY
Qty: 90 CAPSULE | Refills: 11 | Status: SHIPPED | OUTPATIENT
Start: 2022-07-14 | End: 2023-12-18 | Stop reason: SDUPTHER

## 2022-07-14 NOTE — PROGRESS NOTES
The patient location is: home  The chief complaint leading to consultation is: unable to empty her bladder on her own    Visit type: audiovisual    Face to Face time with patient: 25 mins  25 minutes of total time spent on the encounter, which includes face to face time and non-face to face time preparing to see the patient (eg, review of tests), Obtaining and/or reviewing separately obtained history, Documenting clinical information in the electronic or other health record, Independently interpreting results (not separately reported) and communicating results to the patient/family/caregiver, or Care coordination (not separately reported).     Each patient to whom he or she provides medical services by telemedicine is:  (1) informed of the relationship between the physician and patient and the respective role of any other health care provider with respect to management of the patient; and (2) notified that he or she may decline to receive medical services by telemedicine and may withdraw from such care at any time.    Notes:   CC: doing a virtual visit for post op pain following Interstim revision yesterday.      Chelsea Oconnell is a 41 y.o. woman who is here for a virtual visit to discuss her post op pain following Interstim revision yesterday.     Has not been able to urinate on her own for the past couple of months.  She has to do self intermittent catheterization in order to empty her bladder.    C/o incomplete bladder emptying, difficult urination, and pelvic pain.  She spends majority of time in the bathroom because of urinary problems.  Used to urinate every 30 mins or less, and catheterize herself.  Now a days, she is not able to void on her own and has to catheterize all the time.  Some residual urine noted but still feels that she did not empty her bladder bladder.    s/p InterStim therapy for Incomplete bladder emptying and OAB on 7/31/19.  She reports that InterStim test stimulation improved  her voiding function significantly.  However, nowadays, she does not feel the proper stimulation from InterStim Therapy.      Chelsea Oconnell is a 41 y.o. female with refractory UMESH, non-obstructive UR, urgency, frequency and urge urinary incontinence, s/p interstim therapy in 2019 with moderate improvement, IC/pelvic pain.  This has been refractory to medical management.  The patient has decided to pursue repeat neuromodulation therapy.      Date: 07/13/2022     Pre-Op Diagnosis: urinary urgency, frequency, urge urinary incontinence, Pelvis Pain/IC, incomplete bladder emptying, non-obstructive urinary retention.        Past Medical History:   Diagnosis Date    Crohn's colitis      Lupus (systemic lupus erythematosus)     Procedure(s) Performed:   1.  Incision for implantation of neurostimulator electrodes, sacral nerve (transforamenal placement) Bilateral  2.  Insertion of peripheral neurostimulator pulse generator (Bilateral)  3.  Fluoro < 1 h  4.  Electronic analysis of implanted neurostimulator pulse generator system with intraoperative programming  5.  Removal of previous interstim (right lead and left IPG)   Findings:   Lead placed on left and right side ( both new InterStim electrodes)  Generator placed on left and right side ( the new InterStim X on the left side, the old InterStim generator removed from previous left side and placed on the right side)  All systems are now MRI compatible.  The old InterStim generator had 2 to 4 years of battery life left and reused on the right side.  Good sensory and motor function c/w S3 stimulation seen  The rolling pen technique was used to localize S3  Previous interstim device with lead on right and IPG on left removed.     Following her InterStim revision, she is able to void on her own better.  Feels the proper stimulation in vaginal area.  No discomfort there.  However, c/o severe pain on the surgical site, especially on the right side.    Had 5  c-sections, 4 to 5 Gyn surgeries including hysterectomy ( no malignancy).  S/p removal of liver hemangioma.    So underwent FUDS and cysto under anesthesia on 6/5/19 by me.    Procedure Date:  06/05/2019  Pre-OP Diagnosis:   Difficulty in voiding, frequency and urgency, intermittency, incomplete bladder emptying, urethral / bladder pain  Post-OP Diagnosis:   Same, pelvic floor dysfunction  Procedure:   Complex Cystometrogram   Voiding / Pressure Study with Intrarectal Balloon   Complex Uroflow   Electromyogram of Anal Sphincter.   Fluoroscopy less than 1 hour  Findings:   Initial Uroflow study: voided 20 ml, PVR 0 ml, Qmax 2 ml/sec  --- Bladder ---   CYSTOMETROGRAM ( Filling Phase ):   Cystometric Numeric Data:   - First Desire (Sensation): 9 mL at 1 cm of water pressure.   - Normal Desire: 18 mL at 1 cm of water.   - Strong Desire: 31 mL at 1 cm of water.   - Urgency (Imminent Void) : 48 mL at 19 cm of water.   - Maximum Cystometric Capacity: 85 mL at 25 cm of water pressure.   Compliance:   - low.   Leak Point Pressure:   - Valsalva ( Abdominal ) Leak Point Pressure: none.   UROFLOW:   - Voided Volume: unable to void, catheter removed but still unable to void.  At the end, she uses a bathroom, urinated 100 mL.   - Residual Urine: more than 100 mL.   - Maximum Flow Rate: n/a mL/sec.   - Flow Pattern: n/a but suspect an intermittent, abdominal straining.   VOIDING PRESSURE STUDY ( Voiding Phase ):   Detrusor Pressure:   - Maximum Detrusor Pressure: na.   - Detrusor Pressure at Maximum Flow: na.   - Detrusor Contraction Characteristics: na .   ELECTROMYOGRAM:   - unable to relax at the time of voiding  CONCLUSIONS:   1. Pelvic floor dysfunction  2. Chronic pelvic pain  3. Incomplete bladder emptying     Proceed with cysto under anesthesia with hydrodistention, urethral dilation.  Consider Physical Therapy, flomax and valium, possible InterStim Therapy    Procedure(s) Performed: 6/5/19  1.  Cystoscopy with  hydrodistension  2.  Urethral dilation  3.  Bladder instillation  Findings:   - 1L anesthetic bladder capacity  - Diffuse bladder erythema, minimal glomerulations, no Hunner lesions  - Urethra dilated to 32 Fr with urethral sounds    Since her cystoscopic surgery, she is able to void somewhat better on flomax.  Was unable to undergo physical therapy due to her insurance coverage.  Vaginal suppository somewhat helpful, but is not able to use valium into the vagina due to recurrent yeast infection.  She has taken valium orally at night which has helped her.  Has been taking ibuprofen 200 mg 3 to 4 pills at a time 2 to 3 x a day for chronic pelvic pain.    C/o constant pelvic and urethral pain regardless whether her bladder is full or not.  She reports that she does not feel emptying her bladder completely.  She voids at least every 1 hour.  Has been on Uribel at least every other day due to her chronic bladder pain.    She did physical therapy in [place] in the past and noted minimal improvement.  Had botox injection at [facility], which made her symptoms worse.      Past Medical History:   Diagnosis Date    Crohn's colitis     Lupus (systemic lupus erythematosus)      Past Surgical History:   Procedure Laterality Date    CYSTOSCOPY WITH HYDRODISTENSION AND BIOPSY OF BLADDER N/A 6/5/2019    Procedure: CYSTOSCOPY, WITH BLADDER HYDRODISTENSION AND BIOPSY;  Surgeon: Donny Calle MD;  Location: [address];  Service: Urology;  Laterality: N/A;  1 hour    DILATION OF URETHRA N/A 6/5/2019    Procedure: DILATION, URETHRA;  Surgeon: Donny Calle MD;  Location: [facility] OR [address] [place] [address];  Service: Urology;  Laterality: N/A;    FLUOROSCOPIC URODYNAMIC STUDY N/A 6/5/2019    Procedure: URODYNAMIC STUDY, FLUOROSCOPIC;  Surgeon: Donny Calle MD;  Location: [address];  Service: Urology;  Laterality: N/A;  1 hour    FLUOROSCOPY N/A 7/31/2019    Procedure: FLUOROSCOPY;  Surgeon: Donny Calle MD;  Location: [facility]  OR 2ND FLR;  Service: Urology;  Laterality: N/A;    HYSTERECTOMY      INSERTION OF SACRAL NEUROSTIMULATOR GENERATOR Left 8/14/2019    Procedure: INSERTION, PULSE GENERATOR, NEUROSTIMULATOR, SACRAL Stage2;  Surgeon: Donny Calle MD;  Location: [facility] OR 2ND FLR;  Service: Urology;  Laterality: Left;    INSERTION OF SACRAL NEUROSTIMULATOR, ELECTRODES, AND IMPLANTABLE PULSE GENERATOR (IPG) Right 7/31/2019    Procedure: INSERTION, ELECTRODE LEAD, NEUROSTIMULATOR, SACRAL;  Surgeon: Donny Calle MD;  Location: [facility] OR 2ND FLR;  Service: Urology;  Laterality: Right;    INSTILLATION OF URINARY BLADDER N/A 6/5/2019    Procedure: INSTILLATION, BLADDER;  Surgeon: Donny Calle MD;  Location: [facility] OR 1ST FLR;  Service: Urology;  Laterality: N/A;    liver      hemangioma removed    PERCUTANEOUS INSERTION OF SACRAL NERVE NEUROSTIMULATOR ELECTRODE LEAD Left 7/13/2022    Procedure: INSERTION, ELECTRODE LEAD, NEUROSTIMULATOR, SACRAL, PERCUTANEOUS INTERSTIM X;  Surgeon: Donny Calle MD;  Location: [facility] OR 2ND FLR;  Service: Urology;  Laterality: Left;  37983 code  07541 code  35254 code    REVISION OF PROCEDURE INVOLVING SACRAL NEUROSTIMULATOR DEVICE Right 7/13/2022    Procedure: REVISION, NEUROSTIMULATOR, SACRAL;  Surgeon: Donny Calle MD;  Location: [facility] OR 2ND FLR;  Service: Urology;  Laterality: Right;  1.35  new wire  3058 old battery  52505 code  16342 code  80444 code     Social History     Tobacco Use    Smoking status: Never Smoker    Smokeless tobacco: Never Used   Substance Use Topics    Alcohol use: Never    Drug use: Never     No family history on file.  Allergy:  Review of patient's allergies indicates:   Allergen Reactions    Iodine and iodide containing products Rash and Swelling    Shellfish containing products Rash and Swelling    Sulfa (sulfonamide antibiotics) Rash and Swelling    Adhesive Hives     Had reaction to surgical tape after last procedure    Sertraline Hives    Xanax [alprazolam]  "Hives    Vancomycin analogues Itching     Pt had itching, redness to neck/face, and "feeling hot"      Outpatient Encounter Medications as of 7/14/2022   Medication Sig Dispense Refill    amitriptyline (ELAVIL) 25 MG tablet Take 1 tablet (25 mg total) by mouth nightly as needed for Insomnia. One by mouth every evening at bedtime to help with bladder pain and sleeping 90 tablet 3    cephALEXin (KEFLEX) 500 MG capsule Take 1 capsule (500 mg total) by mouth every 6 (six) hours. for 7 days 28 capsule 0    doxazosin (CARDURA) 1 MG tablet Take 1 tablet (1 mg total) by mouth every evening. 30 tablet 11    ESTRADIOL TRANSDERMAL PATCH TD Place onto the skin.      gabapentin (NEURONTIN) 300 MG capsule Take 1 capsule (300 mg total) by mouth 3 (three) times daily. 90 capsule 11    hydrOXYchloroQUINE (PLAQUENIL) 200 mg tablet Take 200 mg by mouth once daily. TAKES MID DAY      Lactobacillus rhamnosus GG (CULTURELLE) 10 billion cell capsule Take 1 capsule by mouth once daily.      LORazepam (ATIVAN) 1 MG tablet Take 1 tablet (1 mg total) by mouth nightly as needed for Anxiety. Take 1 tablet by mouth 30 mins before the procedure 30 tablet 1    multivitamin-Ca-iron-minerals 27-0.4 mg Tab Take by mouth once daily.      omega-3 acid ethyl esters (LOVAZA) 1 gram capsule Take 1 g by mouth once daily.      oxyCODONE (ROXICODONE) 5 MG immediate release tablet Take 1 tablet (5 mg total) by mouth every 4 (four) hours as needed for Pain. 10 tablet 0    phenazopyridine (PYRIDIUM) 100 MG tablet Take 2 tablets (200 mg total) by mouth 3 (three) times daily as needed for Pain (bladder pain). 30 tablet 11    tamsulosin (FLOMAX) 0.4 mg Cap Take 1 capsule (0.4 mg total) by mouth 2 (two) times a day. 180 capsule 3    traMADoL (ULTRAM) 50 mg tablet Take 1 tablet (50 mg total) by mouth every 4 (four) hours as needed for Pain. 30 tablet 0    urinary bag-catheter (VAPRO PLUS INTERMITT CATHETER) 12 Fr- 8" Cmpk 1 Units by Misc.(Non-Drug; " Combo Route) route 6 (six) times daily. 180 each 99    venlafaxine (EFFEXOR-XR) 75 MG 24 hr capsule Take by mouth every morning.      [DISCONTINUED] gabapentin (NEURONTIN) 300 MG capsule Take 1 capsule (300 mg total) by mouth 3 (three) times daily. 90 capsule 11     Facility-Administered Encounter Medications as of 7/14/2022   Medication Dose Route Frequency Provider Last Rate Last Admin    0.9%  NaCl infusion   Intravenous Continuous Akil Rosen MD 10 mL/hr at 07/13/22 1159 New Bag at 07/13/22 1159    LIDOcaine (PF) 10 mg/ml (1%) injection 10 mg  1 mL Intradermal Once Akil Rosen MD        [DISCONTINUED] ceFAZolin 2 gram in dextrose 5% 100 mL IVPB (premix)   Intravenous PRN Eddie Lebron MD   2 g at 07/13/22 1410    [DISCONTINUED] ceFAZolin in sterile water 2 gram/20 mL IV syringe 2,000 mg  2 g Intravenous Once Derek Okeefe MD        [DISCONTINUED] dexamethasone injection   Intravenous PRN Eddie Lebron MD   4 mg at 07/13/22 1410    [DISCONTINUED] diphenhydrAMINE injection 25 mg  25 mg Intravenous Q6H PRN Derek Okeefe MD   25 mg at 07/13/22 1315    [DISCONTINUED] ePHEDrine sulfate   Intravenous PRN Eddie Lebron MD   10 mg at 07/13/22 1601    [DISCONTINUED] fentaNYL 50 mcg/mL injection   Intravenous PRN Eddie Lebron MD   50 mcg at 07/13/22 1601    [DISCONTINUED] haloperidol lactate injection 0.5 mg  0.5 mg Intravenous Q10 Min PRN Eddie Lebron MD        [DISCONTINUED] HYDROmorphone injection 0.2 mg  0.2 mg Intravenous Q15 Min PRN Eddie Lebron MD        [DISCONTINUED] isolyte infusion   Intravenous Continuous PRN Terell Zambrano CRNA   New Bag at 07/13/22 1451    [DISCONTINUED] ketamine in 0.9 % sod chloride 50 mg/5 mL (10 mg/mL) injection   Intravenous PRN Eddie Lebron MD   10 mg at 07/13/22 1538    [DISCONTINUED] LIDOcaine (PF) 20 mg/ml (2%) injection   Epidural PRN Eddie Lebron MD   100 mg at 07/13/22 1350    [DISCONTINUED] LIDOcaine-EPINEPHrine (PF) 1.5%-1:200,000 injection     PRN Donny Calle MD   20 mL at 07/13/22 1442    [DISCONTINUED] ondansetron injection   Intravenous PRN Terell Zambrano CRNA   4 mg at 07/13/22 1601    [DISCONTINUED] phenylephrine HCl in 0.9% NaCl 1 mg/10 mL (100 mcg/mL)   Intravenous PRN Eddie Lebron MD   100 mcg at 07/13/22 1418    [DISCONTINUED] propofol (DIPRIVAN) 10 mg/mL infusion   Intravenous PRN Eddie Lebron MD   10 mg at 07/13/22 1538    [DISCONTINUED] rocuronium injection   Intravenous PRN Eddie Lebron MD   5 mg at 07/13/22 1350    [DISCONTINUED] sodium chloride 0.9% flush 10 mL  10 mL Intravenous PRN Eddie Lebron MD        [DISCONTINUED] sodium chloride 0.9% flush 10 mL  10 mL Intravenous PRN Shahana Meeks MD        [DISCONTINUED] sodium chloride 0.9% infusion   Intravenous Continuous PRN Eddie Lebron MD   Stopped at 07/13/22 1451    [DISCONTINUED] succinylcholine injection   Intravenous PRN Eddie Lebron MD   140 mg at 07/13/22 1350     Review of Systems   ROS  Physical Exam     There were no vitals filed for this visit.  Physical Exam  Constitutional:       General: She is not in acute distress.     Appearance: She is well-developed. She is not diaphoretic.   HENT:      Head: Normocephalic and atraumatic.      Right Ear: External ear normal.      Left Ear: External ear normal.      Nose: Nose normal.   Eyes:      General: No scleral icterus.     Conjunctiva/sclera: Conjunctivae normal.      Pupils: Pupils are equal, round, and reactive to light.   Neck:      Thyroid: No thyromegaly.      Vascular: No JVD.      Trachea: No tracheal deviation.   Cardiovascular:      Rate and Rhythm: Normal rate and regular rhythm.      Heart sounds: Normal heart sounds. No murmur heard.    No friction rub. No gallop.   Pulmonary:      Effort: Pulmonary effort is normal. No respiratory distress.      Breath sounds: Normal breath sounds. No wheezing.   Abdominal:      General: Bowel sounds are normal. There is no distension.      Palpations: Abdomen is  soft. There is no mass.      Tenderness: There is no abdominal tenderness. There is no guarding or rebound.   Genitourinary:     Vagina: Normal. No vaginal discharge.      Comments: No tenderness at the urethra.  Positive tenderness at the kerrie-urethral area on the both sides.  Positive tenderness on palpation each side of vaginal wall.  No tenderness on palpation at the base of the bladder.    Difficulty in localizing the levator ani on YEE of rectum.  Slight tenderness noted on the levator ani.  Musculoskeletal:         General: No tenderness or deformity. Normal range of motion.      Cervical back: Normal range of motion and neck supple.   Lymphadenopathy:      Cervical: No cervical adenopathy.   Skin:     General: Skin is warm and dry.   Neurological:      Mental Status: She is alert and oriented to person, place, and time.   Psychiatric:         Behavior: Behavior normal.         Thought Content: Thought content normal.         LABS:  Lab Results   Component Value Date    CREATININE 0.8 05/07/2019     Urine Culture, Routine   Date Value Ref Range Status   06/02/2020 No growth  Final       Assessment and Plan:  Diagnoses and all orders for this visit:    Acute post-operative pain    Fibromyalgia    Incomplete bladder emptying    wounds looked good with mild ecchymosis seen on today's video visit.    Recommend to apply ice packs over the wounds.  Suspect her worsened pain may be triggered by her underlying  Fibromyalgia.  She takes Lyrica on a regular basis.  Start Neurontin 300 mg TID.  Start Tramadol 50 mg every 4 to 6 hours as needed.  Continue flomax and increase it to BID.  Take Ativan nightly to relax her pelvic muscles to see whether it will improve her bladder symptoms.    I spent 25 minutes with the patient of which more than half was spent in direct consultation with the patient in regards to our treatment and plan.    Follow-up:  Follow up in about 6 weeks (around 8/25/2022).

## 2022-07-14 NOTE — ANESTHESIA POSTPROCEDURE EVALUATION
Anesthesia Post Evaluation    Patient: Chelsea Oconnell    Procedure(s) Performed: Procedure(s) (LRB):  REVISION, NEUROSTIMULATOR, SACRAL (Right)  INSERTION, ELECTRODE LEAD, NEUROSTIMULATOR, SACRAL, PERCUTANEOUS INTERSTIM X (Left)    Final Anesthesia Type: general      Patient location during evaluation: [facility]  Patient participation: Yes- Able to Participate  Level of consciousness: awake and alert  Post-procedure vital signs: reviewed and stable  Pain management: adequate  Airway patency: patent    PONV status at discharge: No PONV  Anesthetic complications: no      Cardiovascular status: blood pressure returned to baseline  Respiratory status: unassisted  Hydration status: euvolemic  Follow-up not needed.          Vitals Value Taken Time   /67 07/13/22 1731   Temp 36.3 °C (97.3 °F) 07/13/22 1615   Pulse 88 07/13/22 1739   Resp 41 07/13/22 1739   SpO2 99 % 07/13/22 1739   Vitals shown include unvalidated device data.      No case tracking events are documented in the log.      Pain/Tatum Score: Tatum Score: 10 (7/13/2022  5:30 PM)

## 2022-07-14 NOTE — PROGRESS NOTES
Post-op pain  -     traMADoL (ULTRAM) 50 mg tablet; Take 1 tablet (50 mg total) by mouth every 4 (four) hours as needed for Pain.  Dispense: 30 tablet; Refill: 0    Pelvic pain in female  -     gabapentin (NEURONTIN) 300 MG capsule; Take 1 capsule (300 mg total) by mouth 3 (three) times daily.  Dispense: 90 capsule; Refill: 11    Fibromyalgia muscle pain  -     gabapentin (NEURONTIN) 300 MG capsule; Take 1 capsule (300 mg total) by mouth 3 (three) times daily.  Dispense: 90 capsule; Refill: 11

## 2022-07-22 ENCOUNTER — OFFICE VISIT (OUTPATIENT)
Dept: UROLOGY | Facility: CLINIC | Age: 42
End: 2022-07-22
Payer: COMMERCIAL

## 2022-07-22 DIAGNOSIS — T14.8XXA OPEN WOUND: Primary | ICD-10-CM

## 2022-07-22 PROCEDURE — 99499 UNLISTED E&M SERVICE: CPT | Mod: 95,,, | Performed by: UROLOGY

## 2022-07-22 PROCEDURE — 99499 NO LOS: ICD-10-PCS | Mod: 95,,, | Performed by: UROLOGY

## 2022-07-22 RX ORDER — CEPHALEXIN 500 MG/1
500 CAPSULE ORAL EVERY 6 HOURS
Qty: 28 CAPSULE | Refills: 0 | Status: SHIPPED | OUTPATIENT
Start: 2022-07-22 | End: 2022-07-29

## 2022-07-22 NOTE — PROGRESS NOTES
The patient location is: home  The chief complaint leading to consultation is: wound open    Visit type: audiovisual    Face to Face time with patient: 15 mins  25 minutes of total time spent on the encounter, which includes face to face time and non-face to face time preparing to see the patient (eg, review of tests), Obtaining and/or reviewing separately obtained history, Documenting clinical information in the electronic or other health record, Independently interpreting results (not separately reported) and communicating results to the patient/family/caregiver, or Care coordination (not separately reported).         Each patient to whom he or she provides medical services by telemedicine is:  (1) informed of the relationship between the physician and patient and the respective role of any other health care provider with respect to management of the patient; and (2) notified that he or she may decline to receive medical services by telemedicine and may withdraw from such care at any time.    Notes:     The patient location is: home  The chief complaint leading to consultation is: unable to empty her bladder on her own    Visit type: audiovisual    Face to Face time with patient: 15 mins  25 minutes of total time spent on the encounter, which includes face to face time and non-face to face time preparing to see the patient (eg, review of tests), Obtaining and/or reviewing separately obtained history, Documenting clinical information in the electronic or other health record, Independently interpreting results (not separately reported) and communicating results to the patient/family/caregiver, or Care coordination (not separately reported).     Each patient to whom he or she provides medical services by telemedicine is:  (1) informed of the relationship between the physician and patient and the respective role of any other health care provider with respect to management of the patient; and (2) notified that he or  she may decline to receive medical services by telemedicine and may withdraw from such care at any time.    Notes:   CC: doing a virtual visit for open wound from her recent surgery    Chelsea Oconnell is a 41 y.o. woman who is here for a virtual visit to discuss her post op pain following Interstim revision yesterday.   Went to the ER because of the open wound.  She noticed some clear slightly blood body fluid drainage from the wound.  No fever or chills or pus drainage from the wound.  Chelsea Oconnell is a 41 y.o. female with refractory UMESH, non-obstructive UR, urgency, frequency and urge urinary incontinence, s/p interstim therapy in 2019 with moderate improvement, IC/pelvic pain.  This has been refractory to medical management.  The patient has decided to pursue repeat neuromodulation therapy.      Date: 07/13/2022     Pre-Op Diagnosis: urinary urgency, frequency, urge urinary incontinence, Pelvis Pain/IC, incomplete bladder emptying, non-obstructive urinary retention.        Past Medical History:   Diagnosis Date    Crohn's colitis      Lupus (systemic lupus erythematosus)     Procedure(s) Performed:   1.  Incision for implantation of neurostimulator electrodes, sacral nerve (transforamenal placement) Bilateral  2.  Insertion of peripheral neurostimulator pulse generator (Bilateral)  3.  Fluoro < 1 h  4.  Electronic analysis of implanted neurostimulator pulse generator system with intraoperative programming  5.  Removal of previous interstim (right lead and left IPG)   Findings:   Lead placed on left and right side ( both new InterStim electrodes)  Generator placed on left and right side ( the new InterStim X on the left side, the old InterStim generator removed from previous left side and placed on the right side)  All systems are now MRI compatible.  The old InterStim generator had 2 to 4 years of battery life left and reused on the right side.  Good sensory and motor function c/w S3  stimulation seen  The rolling pen technique was used to localize S3  Previous interstim device with lead on right and IPG on left removed.     Following her InterStim revision, she is able to void on her own better.  Feels the proper stimulation in vaginal area.  No discomfort there.  However, c/o open wound.      Past Medical History:   Diagnosis Date    Crohn's colitis     Lupus (systemic lupus erythematosus)      Past Surgical History:   Procedure Laterality Date    CYSTOSCOPY WITH HYDRODISTENSION AND BIOPSY OF BLADDER N/A 6/5/2019    Procedure: CYSTOSCOPY, WITH BLADDER HYDRODISTENSION AND BIOPSY;  Surgeon: Donny Calle MD;  [facility];  Service: Urology;  Laterality: N/A;  1 hour    DILATION OF URETHRA N/A 6/5/2019    Procedure: DILATION, URETHRA;  Surgeon: Donny Calle MD;  [facility];  Service: Urology;  Laterality: N/A;    FLUOROSCOPIC URODYNAMIC STUDY N/A 6/5/2019    Procedure: URODYNAMIC STUDY, FLUOROSCOPIC;  Surgeon: Donny Calle MD;  [facility];  Service: Urology;  Laterality: N/A;  1 hour    FLUOROSCOPY N/A 7/31/2019    Procedure: FLUOROSCOPY;  Surgeon: Donny Calle MD;  Location: [facility] OR 2ND FLR;  Service: Urology;  Laterality: N/A;    HYSTERECTOMY      INSERTION OF SACRAL NEUROSTIMULATOR GENERATOR Left 8/14/2019    Procedure: INSERTION, PULSE GENERATOR, NEUROSTIMULATOR, SACRAL Stage2;  Surgeon: Donny Calle MD;  [facility];  Service: Urology;  Laterality: Left;    INSERTION OF SACRAL NEUROSTIMULATOR, ELECTRODES, AND IMPLANTABLE PULSE GENERATOR (IPG) Right 7/31/2019    Procedure: INSERTION, ELECTRODE LEAD, NEUROSTIMULATOR, SACRAL;  Surgeon: Donny Calle MD;  Location: [facility] OR 2ND FLR;  Service: Urology;  Laterality: Right;    INSTILLATION OF URINARY BLADDER N/A 6/5/2019    Procedure: INSTILLATION, BLADDER;  Surgeon: Donny Calle MD;  Location: [facility] OR 1ST FLR;  Service: Urology;  Laterality: N/A;    liver       "hemangioma removed    PERCUTANEOUS INSERTION OF SACRAL NERVE NEUROSTIMULATOR ELECTRODE LEAD Left 7/13/2022    Procedure: INSERTION, ELECTRODE LEAD, NEUROSTIMULATOR, SACRAL, PERCUTANEOUS INTERSTIM X;  Surgeon: Donny Calle MD;  Location: [facility] OR [address] [place] [address];  Service: Urology;  Laterality: Left;  43563 code  62460 code  78317 code    REVISION OF PROCEDURE INVOLVING SACRAL NEUROSTIMULATOR DEVICE Right 7/13/2022    Procedure: REVISION, NEUROSTIMULATOR, SACRAL;  Surgeon: Donny Calle MD;  Location: [facility] OR [address] [place] [address];  Service: Urology;  Laterality: Right;  1.35  new wire  3058 old battery  00051 code  84101 code  89094 code     Social History     Tobacco Use    Smoking status: Never Smoker    Smokeless tobacco: Never Used   Substance Use Topics    Alcohol use: Never    Drug use: Never     No family history on file.  Allergy:  Review of patient's allergies indicates:   Allergen Reactions    Iodine and iodide containing products Rash and Swelling    Shellfish containing products Rash and Swelling    Sulfa (sulfonamide antibiotics) Rash and Swelling    Adhesive Hives     Had reaction to surgical tape after last procedure    Sertraline Hives    Xanax [alprazolam] Hives    Vancomycin analogues Itching     Pt had itching, redness to neck/face, and "feeling hot"      Outpatient Encounter Medications as of 7/22/2022   Medication Sig Dispense Refill    amitriptyline (ELAVIL) 25 MG tablet Take 1 tablet (25 mg total) by mouth nightly as needed for Insomnia. One by mouth every evening at bedtime to help with bladder pain and sleeping 90 tablet 3    cephALEXin (KEFLEX) 500 MG capsule Take 1 capsule (500 mg total) by mouth every 6 (six) hours. for 7 days 28 capsule 0    doxazosin (CARDURA) 1 MG tablet Take 1 tablet (1 mg total) by mouth every evening. 30 tablet 11    ESTRADIOL TRANSDERMAL PATCH TD Place onto the skin.      gabapentin (NEURONTIN) 300 MG capsule Take 1 capsule (300 mg total) by mouth 3 (three) times " "daily. 90 capsule 11    hydrOXYchloroQUINE (PLAQUENIL) 200 mg tablet Take 200 mg by mouth once daily. TAKES MID DAY      Lactobacillus rhamnosus GG (CULTURELLE) 10 billion cell capsule Take 1 capsule by mouth once daily.      LORazepam (ATIVAN) 1 MG tablet Take 1 tablet (1 mg total) by mouth nightly as needed for Anxiety. Take 1 tablet by mouth 30 mins before the procedure 30 tablet 1    multivitamin-Ca-iron-minerals 27-0.4 mg Tab Take by mouth once daily.      omega-3 acid ethyl esters (LOVAZA) 1 gram capsule Take 1 g by mouth once daily.      oxyCODONE (ROXICODONE) 5 MG immediate release tablet Take 1 tablet (5 mg total) by mouth every 4 (four) hours as needed for Pain. 10 tablet 0    phenazopyridine (PYRIDIUM) 100 MG tablet Take 2 tablets (200 mg total) by mouth 3 (three) times daily as needed for Pain (bladder pain). 30 tablet 11    tamsulosin (FLOMAX) 0.4 mg Cap Take 1 capsule (0.4 mg total) by mouth 2 (two) times a day. 180 capsule 3    traMADoL (ULTRAM) 50 mg tablet Take 1 tablet (50 mg total) by mouth every 4 (four) hours as needed for Pain. 30 tablet 0    urinary bag-catheter (VAPRO PLUS INTERMITT CATHETER) 12 Fr- 8" Cmpk 1 Units by Misc.(Non-Drug; Combo Route) route 6 (six) times daily. 180 each 99    venlafaxine (EFFEXOR-XR) 75 MG 24 hr capsule Take by mouth every morning.       Facility-Administered Encounter Medications as of 7/22/2022   Medication Dose Route Frequency Provider Last Rate Last Admin    0.9%  NaCl infusion   Intravenous Continuous Akil Rosen MD 10 mL/hr at 07/13/22 1159 New Bag at 07/13/22 1159    LIDOcaine (PF) 10 mg/ml (1%) injection 10 mg  1 mL Intradermal Once Akil Rosen MD         Review of Systems   ROS  Physical Exam     There were no vitals filed for this visit.  Physical Exam  Constitutional:       General: She is not in acute distress.     Appearance: She is well-developed. She is not diaphoretic.   HENT:      Head: Normocephalic and atraumatic.      " Right Ear: External ear normal.      Left Ear: External ear normal.      Nose: Nose normal.   Eyes:      General: No scleral icterus.     Conjunctiva/sclera: Conjunctivae normal.      Pupils: Pupils are equal, round, and reactive to light.   Neck:      Thyroid: No thyromegaly.      Vascular: No JVD.      Trachea: No tracheal deviation.   Cardiovascular:      Rate and Rhythm: Normal rate and regular rhythm.      Heart sounds: Normal heart sounds. No murmur heard.    No friction rub. No gallop.   Pulmonary:      Effort: Pulmonary effort is normal. No respiratory distress.      Breath sounds: Normal breath sounds. No wheezing.   Abdominal:      General: Bowel sounds are normal. There is no distension.      Palpations: Abdomen is soft. There is no mass.      Tenderness: There is no abdominal tenderness. There is no guarding or rebound.   Genitourinary:     Vagina: Normal. No vaginal discharge.      Comments: The wound overlying the generator at the right hip area is open 2/3.  No generator is exposed.  Only the skin separation is noted.  No obvious discharge noted.  Musculoskeletal:         General: No tenderness or deformity. Normal range of motion.      Cervical back: Normal range of motion and neck supple.   Lymphadenopathy:      Cervical: No cervical adenopathy.   Skin:     General: Skin is warm and dry.   Neurological:      Mental Status: She is alert and oriented to person, place, and time.   Psychiatric:         Behavior: Behavior normal.         Thought Content: Thought content normal.         LABS:  Lab Results   Component Value Date    CREATININE 0.8 05/07/2019     Urine Culture, Routine   Date Value Ref Range Status   06/02/2020 No growth  Final       Assessment and Plan:  Diagnoses and all orders for this visit:    Open wound  -     cephALEXin (KEFLEX) 500 MG capsule; Take 1 capsule (500 mg total) by mouth every 6 (six) hours. for 7 days    wounds looked good with mild ecchymosis seen on today's video  visit.  Only the skin incision is  without exposure of the device.  Pt received 1 dose of IV abx at the ER.  She has been on Keflex but only twice a day.  I advise her to take it 4 x a day.  Cover the wound with sterile dressings.    She takes Lyrica on a regular basis.  Start Neurontin 300 mg TID.  Start Tramadol 50 mg every 4 to 6 hours as needed.  Continue flomax and increase it to BID.  Take Ativan nightly to relax her pelvic muscles to see whether it will improve her bladder symptoms.    I spent 25 minutes with the patient of which more than half was spent in direct consultation with the patient in regards to our treatment and plan.    Follow-up:  No follow-ups on file.

## 2022-07-25 ENCOUNTER — OFFICE VISIT (OUTPATIENT)
Dept: UROLOGY | Facility: CLINIC | Age: 42
End: 2022-07-25
Payer: COMMERCIAL

## 2022-07-25 DIAGNOSIS — S31.819A OPEN WOUND OF RIGHT BUTTOCK, INITIAL ENCOUNTER: Primary | ICD-10-CM

## 2022-07-25 DIAGNOSIS — Z96.82 NEUROSTIMULATOR DEVICE IN SITU: ICD-10-CM

## 2022-07-25 PROCEDURE — 99499 NO LOS: ICD-10-PCS | Mod: 95,,, | Performed by: UROLOGY

## 2022-07-25 PROCEDURE — 99499 UNLISTED E&M SERVICE: CPT | Mod: 95,,, | Performed by: UROLOGY

## 2022-07-25 NOTE — PROGRESS NOTES
The patient location is: home  The chief complaint leading to consultation is: wound open    Visit type: audiovisual    Face to Face time with patient: 15 mins  25 minutes of total time spent on the encounter, which includes face to face time and non-face to face time preparing to see the patient (eg, review of tests), Obtaining and/or reviewing separately obtained history, Documenting clinical information in the electronic or other health record, Independently interpreting results (not separately reported) and communicating results to the patient/family/caregiver, or Care coordination (not separately reported).         Each patient to whom he or she provides medical services by telemedicine is:  (1) informed of the relationship between the physician and patient and the respective role of any other health care provider with respect to management of the patient; and (2) notified that he or she may decline to receive medical services by telemedicine and may withdraw from such care at any time.    Notes:     The patient location is: home  The chief complaint leading to consultation is: unable to empty her bladder on her own    Visit type: audiovisual    Face to Face time with patient: 15 mins  25 minutes of total time spent on the encounter, which includes face to face time and non-face to face time preparing to see the patient (eg, review of tests), Obtaining and/or reviewing separately obtained history, Documenting clinical information in the electronic or other health record, Independently interpreting results (not separately reported) and communicating results to the patient/family/caregiver, or Care coordination (not separately reported).     Each patient to whom he or she provides medical services by telemedicine is:  (1) informed of the relationship between the physician and patient and the respective role of any other health care provider with respect to management of the patient; and (2) notified that he or  she may decline to receive medical services by telemedicine and may withdraw from such care at any time.    Notes:   CC: doing a virtual visit for open wound from her recent surgery    Chelsea Oconnell is a 41 y.o. woman who is here for a virtual visit to discuss her post op pain following Interstim revision yesterday.   Went to the ER because of the open wound.  She noticed some clear slightly blood body fluid drainage from the wound.  No fever or chills or pus drainage from the wound.  Chelsea Oconnell is a 41 y.o. female with refractory UMESH, non-obstructive UR, urgency, frequency and urge urinary incontinence, s/p interstim therapy in 2019 with moderate improvement, IC/pelvic pain.  This has been refractory to medical management.  The patient has decided to pursue repeat neuromodulation therapy.      Date: 07/13/2022     Pre-Op Diagnosis: urinary urgency, frequency, urge urinary incontinence, Pelvis Pain/IC, incomplete bladder emptying, non-obstructive urinary retention.        Past Medical History:   Diagnosis Date    Crohn's colitis      Lupus (systemic lupus erythematosus)     Procedure(s) Performed:   1.  Incision for implantation of neurostimulator electrodes, sacral nerve (transforamenal placement) Bilateral  2.  Insertion of peripheral neurostimulator pulse generator (Bilateral)  3.  Fluoro < 1 h  4.  Electronic analysis of implanted neurostimulator pulse generator system with intraoperative programming  5.  Removal of previous interstim (right lead and left IPG)   Findings:   Lead placed on left and right side ( both new InterStim electrodes)  Generator placed on left and right side ( the new InterStim X on the left side, the old InterStim generator removed from previous left side and placed on the right side)  All systems are now MRI compatible.  The old InterStim generator had 2 to 4 years of battery life left and reused on the right side.  Good sensory and motor function c/w S3  stimulation seen  The rolling pen technique was used to localize S3  Previous interstim device with lead on right and IPG on left removed.     Following her InterStim revision, she is able to void on her own better.  Feels the proper stimulation in vaginal area.  No discomfort there.  However, c/o open wound.      Past Medical History:   Diagnosis Date    Crohn's colitis     Lupus (systemic lupus erythematosus)      Past Surgical History:   Procedure Laterality Date    CYSTOSCOPY WITH HYDRODISTENSION AND BIOPSY OF BLADDER N/A 6/5/2019    Procedure: CYSTOSCOPY, WITH BLADDER HYDRODISTENSION AND BIOPSY;  Surgeon: Donny Calle MD;  [facility];  Service: Urology;  Laterality: N/A;  1 hour    DILATION OF URETHRA N/A 6/5/2019    Procedure: DILATION, URETHRA;  Surgeon: Donny Calle MD;  [facility];  Service: Urology;  Laterality: N/A;    FLUOROSCOPIC URODYNAMIC STUDY N/A 6/5/2019    Procedure: URODYNAMIC STUDY, FLUOROSCOPIC;  Surgeon: Donny Calle MD;  [facility];  Service: Urology;  Laterality: N/A;  1 hour    FLUOROSCOPY N/A 7/31/2019    Procedure: FLUOROSCOPY;  Surgeon: Donny Calle MD;  Location: [facility] OR 2ND FLR;  Service: Urology;  Laterality: N/A;    HYSTERECTOMY      INSERTION OF SACRAL NEUROSTIMULATOR GENERATOR Left 8/14/2019    Procedure: INSERTION, PULSE GENERATOR, NEUROSTIMULATOR, SACRAL Stage2;  Surgeon: Donny Calle MD;  [facility];  Service: Urology;  Laterality: Left;    INSERTION OF SACRAL NEUROSTIMULATOR, ELECTRODES, AND IMPLANTABLE PULSE GENERATOR (IPG) Right 7/31/2019    Procedure: INSERTION, ELECTRODE LEAD, NEUROSTIMULATOR, SACRAL;  Surgeon: Donny Calle MD;  Location: [facility] OR 2ND FLR;  Service: Urology;  Laterality: Right;    INSTILLATION OF URINARY BLADDER N/A 6/5/2019    Procedure: INSTILLATION, BLADDER;  Surgeon: Donny Calle MD;  Location: [facility] OR 1ST FLR;  Service: Urology;  Laterality: N/A;    liver       "hemangioma removed    PERCUTANEOUS INSERTION OF SACRAL NERVE NEUROSTIMULATOR ELECTRODE LEAD Left 7/13/2022    Procedure: INSERTION, ELECTRODE LEAD, NEUROSTIMULATOR, SACRAL, PERCUTANEOUS INTERSTIM X;  Surgeon: Donny Calle MD;  Location: [facility] OR [address] [place] [address];  Service: Urology;  Laterality: Left;  45700 code  74738 code  02234 code    REVISION OF PROCEDURE INVOLVING SACRAL NEUROSTIMULATOR DEVICE Right 7/13/2022    Procedure: REVISION, NEUROSTIMULATOR, SACRAL;  Surgeon: Donny Calle MD;  Location: [facility] OR [address] [place] [address];  Service: Urology;  Laterality: Right;  1.35  new wire  3058 old battery  65379 code  50968 code  64202 code     Social History     Tobacco Use    Smoking status: Never Smoker    Smokeless tobacco: Never Used   Substance Use Topics    Alcohol use: Never    Drug use: Never     No family history on file.  Allergy:  Review of patient's allergies indicates:   Allergen Reactions    Iodine and iodide containing products Rash and Swelling    Shellfish containing products Rash and Swelling    Sulfa (sulfonamide antibiotics) Rash and Swelling    Adhesive Hives     Had reaction to surgical tape after last procedure    Sertraline Hives    Xanax [alprazolam] Hives    Vancomycin analogues Itching     Pt had itching, redness to neck/face, and "feeling hot"      Outpatient Encounter Medications as of 7/25/2022   Medication Sig Dispense Refill    amitriptyline (ELAVIL) 25 MG tablet Take 1 tablet (25 mg total) by mouth nightly as needed for Insomnia. One by mouth every evening at bedtime to help with bladder pain and sleeping 90 tablet 3    cephALEXin (KEFLEX) 500 MG capsule Take 1 capsule (500 mg total) by mouth every 6 (six) hours. for 7 days 28 capsule 0    doxazosin (CARDURA) 1 MG tablet Take 1 tablet (1 mg total) by mouth every evening. 30 tablet 11    ESTRADIOL TRANSDERMAL PATCH TD Place onto the skin.      gabapentin (NEURONTIN) 300 MG capsule Take 1 capsule (300 mg total) by mouth 3 (three) times " "daily. 90 capsule 11    hydrOXYchloroQUINE (PLAQUENIL) 200 mg tablet Take 200 mg by mouth once daily. TAKES MID DAY      Lactobacillus rhamnosus GG (CULTURELLE) 10 billion cell capsule Take 1 capsule by mouth once daily.      LORazepam (ATIVAN) 1 MG tablet Take 1 tablet (1 mg total) by mouth nightly as needed for Anxiety. Take 1 tablet by mouth 30 mins before the procedure 30 tablet 1    multivitamin-Ca-iron-minerals 27-0.4 mg Tab Take by mouth once daily.      omega-3 acid ethyl esters (LOVAZA) 1 gram capsule Take 1 g by mouth once daily.      oxyCODONE (ROXICODONE) 5 MG immediate release tablet Take 1 tablet (5 mg total) by mouth every 4 (four) hours as needed for Pain. 10 tablet 0    phenazopyridine (PYRIDIUM) 100 MG tablet Take 2 tablets (200 mg total) by mouth 3 (three) times daily as needed for Pain (bladder pain). 30 tablet 11    tamsulosin (FLOMAX) 0.4 mg Cap Take 1 capsule (0.4 mg total) by mouth 2 (two) times a day. 180 capsule 3    traMADoL (ULTRAM) 50 mg tablet Take 1 tablet (50 mg total) by mouth every 4 (four) hours as needed for Pain. 30 tablet 0    urinary bag-catheter (VAPRO PLUS INTERMITT CATHETER) 12 Fr- 8" Cmpk 1 Units by Misc.(Non-Drug; Combo Route) route 6 (six) times daily. 180 each 99    venlafaxine (EFFEXOR-XR) 75 MG 24 hr capsule Take by mouth every morning.       Facility-Administered Encounter Medications as of 7/25/2022   Medication Dose Route Frequency Provider Last Rate Last Admin    0.9%  NaCl infusion   Intravenous Continuous Akil Rosen MD 10 mL/hr at 07/13/22 1159 New Bag at 07/13/22 1159    LIDOcaine (PF) 10 mg/ml (1%) injection 10 mg  1 mL Intradermal Once Akil Rosen MD         Review of Systems   ROS  Physical Exam     There were no vitals filed for this visit.  Physical Exam  Constitutional:       General: She is not in acute distress.     Appearance: She is well-developed. She is not diaphoretic.   HENT:      Head: Normocephalic and atraumatic.      " Right Ear: External ear normal.      Left Ear: External ear normal.      Nose: Nose normal.   Eyes:      General: No scleral icterus.     Conjunctiva/sclera: Conjunctivae normal.      Pupils: Pupils are equal, round, and reactive to light.   Neck:      Thyroid: No thyromegaly.      Vascular: No JVD.      Trachea: No tracheal deviation.   Cardiovascular:      Rate and Rhythm: Normal rate and regular rhythm.      Heart sounds: Normal heart sounds. No murmur heard.    No friction rub. No gallop.   Pulmonary:      Effort: Pulmonary effort is normal. No respiratory distress.      Breath sounds: Normal breath sounds. No wheezing.   Abdominal:      General: Bowel sounds are normal. There is no distension.      Palpations: Abdomen is soft. There is no mass.      Tenderness: There is no abdominal tenderness. There is no guarding or rebound.   Genitourinary:     Vagina: Normal. No vaginal discharge.      Comments: The wound overlying the generator at the right hip area is open 2/3.  No generator is exposed.  Only the skin separation is noted.  No obvious discharge noted.  Musculoskeletal:         General: No tenderness or deformity. Normal range of motion.      Cervical back: Normal range of motion and neck supple.   Lymphadenopathy:      Cervical: No cervical adenopathy.   Skin:     General: Skin is warm and dry.   Neurological:      Mental Status: She is alert and oriented to person, place, and time.   Psychiatric:         Behavior: Behavior normal.         Thought Content: Thought content normal.         LABS:  Lab Results   Component Value Date    CREATININE 0.8 05/07/2019     Urine Culture, Routine   Date Value Ref Range Status   06/02/2020 No growth  Final       Assessment and Plan:  Diagnoses and all orders for this visit:    Open wound of right buttock, initial encounter    Neurostimulator device in situ    pt reports persistent drainage from the right side wound.  On video exam, there is no obvious cellulitis  involving the wound.  I would like to take a look at it in person.  Will see her at 8 am tomorrow.  Continue Keflex.    She takes Lyrica on a regular basis.  Start Neurontin 300 mg TID.  Start Tramadol 50 mg every 4 to 6 hours as needed.  Continue flomax and increase it to BID.  Take Ativan nightly to relax her pelvic muscles to see whether it will improve her bladder symptoms.    I spent 25 minutes with the patient of which more than half was spent in direct consultation with the patient in regards to our treatment and plan.    Follow-up:  Follow up in about 1 day (around 7/26/2022), or wound check.

## 2022-07-25 NOTE — H&P (VIEW-ONLY)
The patient location is: home  The chief complaint leading to consultation is: wound open    Visit type: audiovisual    Face to Face time with patient: 15 mins  25 minutes of total time spent on the encounter, which includes face to face time and non-face to face time preparing to see the patient (eg, review of tests), Obtaining and/or reviewing separately obtained history, Documenting clinical information in the electronic or other health record, Independently interpreting results (not separately reported) and communicating results to the patient/family/caregiver, or Care coordination (not separately reported).         Each patient to whom he or she provides medical services by telemedicine is:  (1) informed of the relationship between the physician and patient and the respective role of any other health care provider with respect to management of the patient; and (2) notified that he or she may decline to receive medical services by telemedicine and may withdraw from such care at any time.    Notes:     The patient location is: home  The chief complaint leading to consultation is: unable to empty her bladder on her own    Visit type: audiovisual    Face to Face time with patient: 15 mins  25 minutes of total time spent on the encounter, which includes face to face time and non-face to face time preparing to see the patient (eg, review of tests), Obtaining and/or reviewing separately obtained history, Documenting clinical information in the electronic or other health record, Independently interpreting results (not separately reported) and communicating results to the patient/family/caregiver, or Care coordination (not separately reported).     Each patient to whom he or she provides medical services by telemedicine is:  (1) informed of the relationship between the physician and patient and the respective role of any other health care provider with respect to management of the patient; and (2) notified that he or  she may decline to receive medical services by telemedicine and may withdraw from such care at any time.    Notes:   CC: doing a virtual visit for open wound from her recent surgery    Chelsea Oconnell is a 41 y.o. woman who is here for a virtual visit to discuss her post op pain following Interstim revision yesterday.   Went to the ER because of the open wound.  She noticed some clear slightly blood body fluid drainage from the wound.  No fever or chills or pus drainage from the wound.  Chelsea Oconnell is a 41 y.o. female with refractory UMESH, non-obstructive UR, urgency, frequency and urge urinary incontinence, s/p interstim therapy in 2019 with moderate improvement, IC/pelvic pain.  This has been refractory to medical management.  The patient has decided to pursue repeat neuromodulation therapy.      Date: 07/13/2022     Pre-Op Diagnosis: urinary urgency, frequency, urge urinary incontinence, Pelvis Pain/IC, incomplete bladder emptying, non-obstructive urinary retention.        Past Medical History:   Diagnosis Date    Crohn's colitis      Lupus (systemic lupus erythematosus)     Procedure(s) Performed:   1.  Incision for implantation of neurostimulator electrodes, sacral nerve (transforamenal placement) Bilateral  2.  Insertion of peripheral neurostimulator pulse generator (Bilateral)  3.  Fluoro < 1 h  4.  Electronic analysis of implanted neurostimulator pulse generator system with intraoperative programming  5.  Removal of previous interstim (right lead and left IPG)   Findings:   Lead placed on left and right side ( both new InterStim electrodes)  Generator placed on left and right side ( the new InterStim X on the left side, the old InterStim generator removed from previous left side and placed on the right side)  All systems are now MRI compatible.  The old InterStim generator had 2 to 4 years of battery life left and reused on the right side.  Good sensory and motor function c/w S3  stimulation seen  The rolling pen technique was used to localize S3  Previous interstim device with lead on right and IPG on left removed.     Following her InterStim revision, she is able to void on her own better.  Feels the proper stimulation in vaginal area.  No discomfort there.  However, c/o open wound.      Past Medical History:   Diagnosis Date    Crohn's colitis     Lupus (systemic lupus erythematosus)      Past Surgical History:   Procedure Laterality Date    CYSTOSCOPY WITH HYDRODISTENSION AND BIOPSY OF BLADDER N/A 6/5/2019    Procedure: CYSTOSCOPY, WITH BLADDER HYDRODISTENSION AND BIOPSY;  Surgeon: Donny Calle MD;  [facility];  Service: Urology;  Laterality: N/A;  1 hour    DILATION OF URETHRA N/A 6/5/2019    Procedure: DILATION, URETHRA;  Surgeon: Donny Calle MD;  [facility];  Service: Urology;  Laterality: N/A;    FLUOROSCOPIC URODYNAMIC STUDY N/A 6/5/2019    Procedure: URODYNAMIC STUDY, FLUOROSCOPIC;  Surgeon: Donny Calle MD;  [facility];  Service: Urology;  Laterality: N/A;  1 hour    FLUOROSCOPY N/A 7/31/2019    Procedure: FLUOROSCOPY;  Surgeon: Donny Calle MD;  Location: [facility] OR 2ND FLR;  Service: Urology;  Laterality: N/A;    HYSTERECTOMY      INSERTION OF SACRAL NEUROSTIMULATOR GENERATOR Left 8/14/2019    Procedure: INSERTION, PULSE GENERATOR, NEUROSTIMULATOR, SACRAL Stage2;  Surgeon: Donny Calle MD;  [facility];  Service: Urology;  Laterality: Left;    INSERTION OF SACRAL NEUROSTIMULATOR, ELECTRODES, AND IMPLANTABLE PULSE GENERATOR (IPG) Right 7/31/2019    Procedure: INSERTION, ELECTRODE LEAD, NEUROSTIMULATOR, SACRAL;  Surgeon: Donny Calle MD;  Location: [facility] OR 2ND FLR;  Service: Urology;  Laterality: Right;    INSTILLATION OF URINARY BLADDER N/A 6/5/2019    Procedure: INSTILLATION, BLADDER;  Surgeon: Donny Calle MD;  Location: [facility] OR 1ST FLR;  Service: Urology;  Laterality: N/A;    liver       "hemangioma removed    PERCUTANEOUS INSERTION OF SACRAL NERVE NEUROSTIMULATOR ELECTRODE LEAD Left 7/13/2022    Procedure: INSERTION, ELECTRODE LEAD, NEUROSTIMULATOR, SACRAL, PERCUTANEOUS INTERSTIM X;  Surgeon: Donny Calle MD;  Location: [facility] OR [address] [place] [address];  Service: Urology;  Laterality: Left;  21039 code  03884 code  81717 code    REVISION OF PROCEDURE INVOLVING SACRAL NEUROSTIMULATOR DEVICE Right 7/13/2022    Procedure: REVISION, NEUROSTIMULATOR, SACRAL;  Surgeon: Donny Calle MD;  Location: [facility] OR [address] [place] [address];  Service: Urology;  Laterality: Right;  1.35  new wire  3058 old battery  46701 code  26298 code  44778 code     Social History     Tobacco Use    Smoking status: Never Smoker    Smokeless tobacco: Never Used   Substance Use Topics    Alcohol use: Never    Drug use: Never     No family history on file.  Allergy:  Review of patient's allergies indicates:   Allergen Reactions    Iodine and iodide containing products Rash and Swelling    Shellfish containing products Rash and Swelling    Sulfa (sulfonamide antibiotics) Rash and Swelling    Adhesive Hives     Had reaction to surgical tape after last procedure    Sertraline Hives    Xanax [alprazolam] Hives    Vancomycin analogues Itching     Pt had itching, redness to neck/face, and "feeling hot"      Outpatient Encounter Medications as of 7/25/2022   Medication Sig Dispense Refill    amitriptyline (ELAVIL) 25 MG tablet Take 1 tablet (25 mg total) by mouth nightly as needed for Insomnia. One by mouth every evening at bedtime to help with bladder pain and sleeping 90 tablet 3    cephALEXin (KEFLEX) 500 MG capsule Take 1 capsule (500 mg total) by mouth every 6 (six) hours. for 7 days 28 capsule 0    doxazosin (CARDURA) 1 MG tablet Take 1 tablet (1 mg total) by mouth every evening. 30 tablet 11    ESTRADIOL TRANSDERMAL PATCH TD Place onto the skin.      gabapentin (NEURONTIN) 300 MG capsule Take 1 capsule (300 mg total) by mouth 3 (three) times " "daily. 90 capsule 11    hydrOXYchloroQUINE (PLAQUENIL) 200 mg tablet Take 200 mg by mouth once daily. TAKES MID DAY      Lactobacillus rhamnosus GG (CULTURELLE) 10 billion cell capsule Take 1 capsule by mouth once daily.      LORazepam (ATIVAN) 1 MG tablet Take 1 tablet (1 mg total) by mouth nightly as needed for Anxiety. Take 1 tablet by mouth 30 mins before the procedure 30 tablet 1    multivitamin-Ca-iron-minerals 27-0.4 mg Tab Take by mouth once daily.      omega-3 acid ethyl esters (LOVAZA) 1 gram capsule Take 1 g by mouth once daily.      oxyCODONE (ROXICODONE) 5 MG immediate release tablet Take 1 tablet (5 mg total) by mouth every 4 (four) hours as needed for Pain. 10 tablet 0    phenazopyridine (PYRIDIUM) 100 MG tablet Take 2 tablets (200 mg total) by mouth 3 (three) times daily as needed for Pain (bladder pain). 30 tablet 11    tamsulosin (FLOMAX) 0.4 mg Cap Take 1 capsule (0.4 mg total) by mouth 2 (two) times a day. 180 capsule 3    traMADoL (ULTRAM) 50 mg tablet Take 1 tablet (50 mg total) by mouth every 4 (four) hours as needed for Pain. 30 tablet 0    urinary bag-catheter (VAPRO PLUS INTERMITT CATHETER) 12 Fr- 8" Cmpk 1 Units by Misc.(Non-Drug; Combo Route) route 6 (six) times daily. 180 each 99    venlafaxine (EFFEXOR-XR) 75 MG 24 hr capsule Take by mouth every morning.       Facility-Administered Encounter Medications as of 7/25/2022   Medication Dose Route Frequency Provider Last Rate Last Admin    0.9%  NaCl infusion   Intravenous Continuous Akil Rosen MD 10 mL/hr at 07/13/22 1159 New Bag at 07/13/22 1159    LIDOcaine (PF) 10 mg/ml (1%) injection 10 mg  1 mL Intradermal Once Akil Rosen MD         Review of Systems   ROS  Physical Exam     There were no vitals filed for this visit.  Physical Exam  Constitutional:       General: She is not in acute distress.     Appearance: She is well-developed. She is not diaphoretic.   HENT:      Head: Normocephalic and atraumatic.      " Right Ear: External ear normal.      Left Ear: External ear normal.      Nose: Nose normal.   Eyes:      General: No scleral icterus.     Conjunctiva/sclera: Conjunctivae normal.      Pupils: Pupils are equal, round, and reactive to light.   Neck:      Thyroid: No thyromegaly.      Vascular: No JVD.      Trachea: No tracheal deviation.   Cardiovascular:      Rate and Rhythm: Normal rate and regular rhythm.      Heart sounds: Normal heart sounds. No murmur heard.    No friction rub. No gallop.   Pulmonary:      Effort: Pulmonary effort is normal. No respiratory distress.      Breath sounds: Normal breath sounds. No wheezing.   Abdominal:      General: Bowel sounds are normal. There is no distension.      Palpations: Abdomen is soft. There is no mass.      Tenderness: There is no abdominal tenderness. There is no guarding or rebound.   Genitourinary:     Vagina: Normal. No vaginal discharge.      Comments: The wound overlying the generator at the right hip area is open 2/3.  No generator is exposed.  Only the skin separation is noted.  No obvious discharge noted.  Musculoskeletal:         General: No tenderness or deformity. Normal range of motion.      Cervical back: Normal range of motion and neck supple.   Lymphadenopathy:      Cervical: No cervical adenopathy.   Skin:     General: Skin is warm and dry.   Neurological:      Mental Status: She is alert and oriented to person, place, and time.   Psychiatric:         Behavior: Behavior normal.         Thought Content: Thought content normal.         LABS:  Lab Results   Component Value Date    CREATININE 0.8 05/07/2019     Urine Culture, Routine   Date Value Ref Range Status   06/02/2020 No growth  Final       Assessment and Plan:  Diagnoses and all orders for this visit:    Open wound of right buttock, initial encounter    Neurostimulator device in situ    pt reports persistent drainage from the right side wound.  On video exam, there is no obvious cellulitis  involving the wound.  I would like to take a look at it in person.  Will see her at 8 am tomorrow.  Continue Keflex.    She takes Lyrica on a regular basis.  Start Neurontin 300 mg TID.  Start Tramadol 50 mg every 4 to 6 hours as needed.  Continue flomax and increase it to BID.  Take Ativan nightly to relax her pelvic muscles to see whether it will improve her bladder symptoms.    I spent 25 minutes with the patient of which more than half was spent in direct consultation with the patient in regards to our treatment and plan.    Follow-up:  Follow up in about 1 day (around 7/26/2022), or wound check.

## 2022-07-26 ENCOUNTER — TELEPHONE (OUTPATIENT)
Dept: UROLOGY | Facility: CLINIC | Age: 42
End: 2022-07-26
Payer: COMMERCIAL

## 2022-07-26 ENCOUNTER — RESEARCH ENCOUNTER (OUTPATIENT)
Dept: RESEARCH | Facility: HOSPITAL | Age: 42
End: 2022-07-26

## 2022-07-26 ENCOUNTER — HOSPITAL ENCOUNTER (OUTPATIENT)
Facility: HOSPITAL | Age: 42
Discharge: HOME | End: 2022-07-26
Attending: UROLOGY | Admitting: UROLOGY
Payer: COMMERCIAL

## 2022-07-26 ENCOUNTER — OFFICE VISIT (OUTPATIENT)
Dept: UROLOGY | Facility: CLINIC | Age: 42
End: 2022-07-26
Payer: COMMERCIAL

## 2022-07-26 ENCOUNTER — ANESTHESIA EVENT (OUTPATIENT)
Dept: SURGERY | Facility: HOSPITAL | Age: 42
End: 2022-07-26
Payer: COMMERCIAL

## 2022-07-26 ENCOUNTER — ANESTHESIA (OUTPATIENT)
Dept: SURGERY | Facility: HOSPITAL | Age: 42
End: 2022-07-26
Payer: COMMERCIAL

## 2022-07-26 VITALS
TEMPERATURE: 98 F | DIASTOLIC BLOOD PRESSURE: 74 MMHG | RESPIRATION RATE: 18 BRPM | OXYGEN SATURATION: 100 % | SYSTOLIC BLOOD PRESSURE: 105 MMHG | HEART RATE: 62 BPM

## 2022-07-26 DIAGNOSIS — T85.734A: Primary | ICD-10-CM

## 2022-07-26 DIAGNOSIS — T85.734A: ICD-10-CM

## 2022-07-26 LAB
CTP QC/QA: YES
GRAM STN SPEC: NORMAL
GRAM STN SPEC: NORMAL
SARS-COV-2 AG RESP QL IA.RAPID: NEGATIVE

## 2022-07-26 PROCEDURE — 64595 REV/RMV PRPH SAC/GSTR NPG/R: CPT | Mod: 78,51,, | Performed by: UROLOGY

## 2022-07-26 PROCEDURE — 71000044 HC DOSC ROUTINE RECOVERY FIRST HOUR: Performed by: UROLOGY

## 2022-07-26 PROCEDURE — C1729 CATH, DRAINAGE: HCPCS | Performed by: UROLOGY

## 2022-07-26 PROCEDURE — 36000706: Performed by: UROLOGY

## 2022-07-26 PROCEDURE — 64585 REV/RMV PERPH NSTIM ELTRD RA: CPT | Mod: 78,,, | Performed by: UROLOGY

## 2022-07-26 PROCEDURE — 37000009 HC ANESTHESIA EA ADD 15 MINS: Performed by: UROLOGY

## 2022-07-26 PROCEDURE — D9220A PRA ANESTHESIA: ICD-10-PCS | Mod: ,,, | Performed by: STUDENT IN AN ORGANIZED HEALTH CARE EDUCATION/TRAINING PROGRAM

## 2022-07-26 PROCEDURE — 87102 FUNGUS ISOLATION CULTURE: CPT | Performed by: UROLOGY

## 2022-07-26 PROCEDURE — 71000015 HC POSTOP RECOV 1ST HR: Performed by: UROLOGY

## 2022-07-26 PROCEDURE — 25000003 PHARM REV CODE 250: Performed by: STUDENT IN AN ORGANIZED HEALTH CARE EDUCATION/TRAINING PROGRAM

## 2022-07-26 PROCEDURE — 63600175 PHARM REV CODE 636 W HCPCS

## 2022-07-26 PROCEDURE — 99214 OFFICE O/P EST MOD 30 MIN: CPT | Mod: 25,S$GLB,, | Performed by: UROLOGY

## 2022-07-26 PROCEDURE — 71000045 HC DOSC ROUTINE RECOVERY EA ADD'L HR: Performed by: UROLOGY

## 2022-07-26 PROCEDURE — 87205 SMEAR GRAM STAIN: CPT | Performed by: UROLOGY

## 2022-07-26 PROCEDURE — 36000707: Performed by: UROLOGY

## 2022-07-26 PROCEDURE — 37000008 HC ANESTHESIA 1ST 15 MINUTES: Performed by: UROLOGY

## 2022-07-26 PROCEDURE — 64595 PR REVISE/REMOVE PERIPH/GASTRIC NEUROSTIM/RECEIVER: ICD-10-PCS | Mod: 78,51,, | Performed by: UROLOGY

## 2022-07-26 PROCEDURE — 99999 PR PBB SHADOW E&M-EST. PATIENT-LVL I: ICD-10-PCS | Mod: PBBFAC,,, | Performed by: UROLOGY

## 2022-07-26 PROCEDURE — 64585 PR REVISE/REMOVE PERIPHERAL NEUROELECTRODE: ICD-10-PCS | Mod: 78,,, | Performed by: UROLOGY

## 2022-07-26 PROCEDURE — 99214 PR OFFICE/OUTPT VISIT, EST, LEVL IV, 30-39 MIN: ICD-10-PCS | Mod: 25,S$GLB,, | Performed by: UROLOGY

## 2022-07-26 PROCEDURE — 87070 CULTURE OTHR SPECIMN AEROBIC: CPT | Performed by: UROLOGY

## 2022-07-26 PROCEDURE — 25000003 PHARM REV CODE 250

## 2022-07-26 PROCEDURE — D9220A PRA ANESTHESIA: Mod: ,,, | Performed by: STUDENT IN AN ORGANIZED HEALTH CARE EDUCATION/TRAINING PROGRAM

## 2022-07-26 PROCEDURE — 99999 PR PBB SHADOW E&M-EST. PATIENT-LVL I: CPT | Mod: PBBFAC,,, | Performed by: UROLOGY

## 2022-07-26 PROCEDURE — 63600175 PHARM REV CODE 636 W HCPCS: Performed by: STUDENT IN AN ORGANIZED HEALTH CARE EDUCATION/TRAINING PROGRAM

## 2022-07-26 PROCEDURE — 25000003 PHARM REV CODE 250: Performed by: UROLOGY

## 2022-07-26 PROCEDURE — 87075 CULTR BACTERIA EXCEPT BLOOD: CPT | Performed by: UROLOGY

## 2022-07-26 RX ORDER — LIDOCAINE HYDROCHLORIDE 20 MG/ML
INJECTION, SOLUTION EPIDURAL; INFILTRATION; INTRACAUDAL; PERINEURAL
Status: DISCONTINUED | OUTPATIENT
Start: 2022-07-26 | End: 2022-07-26

## 2022-07-26 RX ORDER — FENTANYL CITRATE 50 UG/ML
25 INJECTION, SOLUTION INTRAMUSCULAR; INTRAVENOUS EVERY 5 MIN PRN
Status: COMPLETED | OUTPATIENT
Start: 2022-07-26 | End: 2022-07-26

## 2022-07-26 RX ORDER — PROPOFOL 10 MG/ML
VIAL (ML) INTRAVENOUS
Status: DISCONTINUED | OUTPATIENT
Start: 2022-07-26 | End: 2022-07-26

## 2022-07-26 RX ORDER — FENTANYL CITRATE 50 UG/ML
INJECTION, SOLUTION INTRAMUSCULAR; INTRAVENOUS
Status: DISCONTINUED | OUTPATIENT
Start: 2022-07-26 | End: 2022-07-26

## 2022-07-26 RX ORDER — OXYCODONE HYDROCHLORIDE 5 MG/1
5 TABLET ORAL ONCE
Status: COMPLETED | OUTPATIENT
Start: 2022-07-26 | End: 2022-07-26

## 2022-07-26 RX ORDER — PROPOFOL 10 MG/ML
VIAL (ML) INTRAVENOUS CONTINUOUS PRN
Status: DISCONTINUED | OUTPATIENT
Start: 2022-07-26 | End: 2022-07-26

## 2022-07-26 RX ORDER — OXYCODONE HYDROCHLORIDE 5 MG/1
TABLET ORAL
Status: DISCONTINUED
Start: 2022-07-26 | End: 2022-07-26 | Stop reason: HOSPADM

## 2022-07-26 RX ORDER — DIPHENHYDRAMINE HYDROCHLORIDE 50 MG/ML
INJECTION INTRAMUSCULAR; INTRAVENOUS
Status: DISCONTINUED | OUTPATIENT
Start: 2022-07-26 | End: 2022-07-26

## 2022-07-26 RX ORDER — ACETAMINOPHEN 10 MG/ML
INJECTION, SOLUTION INTRAVENOUS
Status: DISCONTINUED | OUTPATIENT
Start: 2022-07-26 | End: 2022-07-26

## 2022-07-26 RX ORDER — OXYCODONE HYDROCHLORIDE 5 MG/1
5 TABLET ORAL EVERY 6 HOURS PRN
Qty: 5 TABLET | Refills: 0 | Status: SHIPPED | OUTPATIENT
Start: 2022-07-26

## 2022-07-26 RX ORDER — CEPHALEXIN 500 MG/1
500 CAPSULE ORAL EVERY 6 HOURS
Qty: 40 CAPSULE | Refills: 0 | Status: SHIPPED | OUTPATIENT
Start: 2022-07-26 | End: 2022-08-05

## 2022-07-26 RX ORDER — CEFAZOLIN SODIUM 1 G/3ML
2 INJECTION, POWDER, FOR SOLUTION INTRAMUSCULAR; INTRAVENOUS ONCE
Status: DISCONTINUED | OUTPATIENT
Start: 2022-07-26 | End: 2022-07-26 | Stop reason: HOSPADM

## 2022-07-26 RX ORDER — LIDOCAINE HYDROCHLORIDE 10 MG/ML
INJECTION, SOLUTION EPIDURAL; INFILTRATION; INTRACAUDAL; PERINEURAL
Status: DISCONTINUED | OUTPATIENT
Start: 2022-07-26 | End: 2022-07-26 | Stop reason: HOSPADM

## 2022-07-26 RX ORDER — HALOPERIDOL 5 MG/ML
0.5 INJECTION INTRAMUSCULAR EVERY 10 MIN PRN
Status: DISCONTINUED | OUTPATIENT
Start: 2022-07-26 | End: 2022-07-26 | Stop reason: HOSPADM

## 2022-07-26 RX ORDER — SODIUM CHLORIDE 0.9 % (FLUSH) 0.9 %
10 SYRINGE (ML) INJECTION
Status: DISCONTINUED | OUTPATIENT
Start: 2022-07-26 | End: 2022-07-26 | Stop reason: HOSPADM

## 2022-07-26 RX ORDER — CEFAZOLIN SODIUM 2 G/50ML
SOLUTION INTRAVENOUS
Status: DISCONTINUED | OUTPATIENT
Start: 2022-07-26 | End: 2022-07-26

## 2022-07-26 RX ORDER — MIDAZOLAM HYDROCHLORIDE 1 MG/ML
INJECTION, SOLUTION INTRAMUSCULAR; INTRAVENOUS
Status: DISCONTINUED | OUTPATIENT
Start: 2022-07-26 | End: 2022-07-26

## 2022-07-26 RX ORDER — BUPIVACAINE HYDROCHLORIDE 2.5 MG/ML
INJECTION, SOLUTION EPIDURAL; INFILTRATION; INTRACAUDAL
Status: DISCONTINUED | OUTPATIENT
Start: 2022-07-26 | End: 2022-07-26 | Stop reason: HOSPADM

## 2022-07-26 RX ADMIN — PROPOFOL 10 MG: 10 INJECTION, EMULSION INTRAVENOUS at 01:07

## 2022-07-26 RX ADMIN — CEFAZOLIN SODIUM 2 G: 2 SOLUTION INTRAVENOUS at 12:07

## 2022-07-26 RX ADMIN — GENTAMICIN SULFATE 320 MG: 40 INJECTION, SOLUTION INTRAMUSCULAR; INTRAVENOUS at 12:07

## 2022-07-26 RX ADMIN — PROPOFOL 5 MG: 10 INJECTION, EMULSION INTRAVENOUS at 01:07

## 2022-07-26 RX ADMIN — FENTANYL CITRATE 25 MCG: 50 INJECTION INTRAMUSCULAR; INTRAVENOUS at 02:07

## 2022-07-26 RX ADMIN — FENTANYL CITRATE 25 MCG: 50 INJECTION INTRAMUSCULAR; INTRAVENOUS at 01:07

## 2022-07-26 RX ADMIN — PROPOFOL 10 MG: 10 INJECTION, EMULSION INTRAVENOUS at 12:07

## 2022-07-26 RX ADMIN — ACETAMINOPHEN 1000 MG: 10 INJECTION INTRAVENOUS at 01:07

## 2022-07-26 RX ADMIN — Medication 150 MCG/KG/MIN: at 12:07

## 2022-07-26 RX ADMIN — FENTANYL CITRATE 25 MCG: 50 INJECTION INTRAMUSCULAR; INTRAVENOUS at 03:07

## 2022-07-26 RX ADMIN — OXYCODONE 5 MG: 5 TABLET ORAL at 03:07

## 2022-07-26 RX ADMIN — SODIUM CHLORIDE: 0.9 INJECTION, SOLUTION INTRAVENOUS at 12:07

## 2022-07-26 RX ADMIN — MIDAZOLAM 2 MG: 1 INJECTION INTRAMUSCULAR; INTRAVENOUS at 12:07

## 2022-07-26 RX ADMIN — LIDOCAINE HYDROCHLORIDE 20 MG: 20 INJECTION, SOLUTION EPIDURAL; INFILTRATION; INTRACAUDAL; PERINEURAL at 12:07

## 2022-07-26 RX ADMIN — PROPOFOL 15 MG: 10 INJECTION, EMULSION INTRAVENOUS at 01:07

## 2022-07-26 RX ADMIN — DIPHENHYDRAMINE HYDROCHLORIDE 25 MG: 50 INJECTION INTRAMUSCULAR; INTRAVENOUS at 12:07

## 2022-07-26 RX ADMIN — FENTANYL CITRATE 50 MCG: 50 INJECTION INTRAMUSCULAR; INTRAVENOUS at 12:07

## 2022-07-26 NOTE — PATIENT INSTRUCTIONS
Please record your daily output from your RAMON drain in a log and bring that log with you to your next clinic appointment.    You may shower starting tomorrow (7/27/22). Do not scrub or submerge incisions in water. You may remove dressing tomorrow.

## 2022-07-26 NOTE — OP NOTE
[facility]  Operative Note    Date: 07/26/2022    Pre-Op Diagnosis: Infection involving right-sided InterStim IPG and lead    Patient Active Problem List    Diagnosis Date Noted    Infection associated with generator of implanted electronic neurostimulator 07/26/2022    Open wound of right buttock 07/25/2022    Mechanical breakdown of generator of implanted electronic neurostimulator 07/07/2022    Fibromyalgia 06/02/2020    Overactive bladder 08/14/2019    Incomplete bladder emptying 07/16/2019    Pelvic pain in female 07/16/2019    IC (interstitial cystitis) 05/07/2019    Chronic bladder pain 05/07/2019    OAB (overactive bladder) 05/07/2019     Post-Op Diagnosis: same    Procedure(s) Performed:   1.  Removal without replacement of neurostimulator electrode array (right side) (59810)  2.  Removal without replacement of implantable pulse generator (right side) (13445)    Specimen(s): Wound culture    Staff Surgeon: Donny Calle MD    Assistant Surgeon: Thierry Salinas MD; Josh Juarez MD    Anesthesia:  Monitored Local Anesthesia with Sedation    Indications: Chelsea Oconnell is a 41 y.o. female who recently underwent placement of bilateral InterStim leads and IPG's. Now, there is concern for infection involving her right-sided IPG and lead. She is presenting for surgical intervention.        Findings:    1. Right-sided IPG incision with complete superficial skin separation, fibrinous exudate within wound, and drainage of serous fluid. No surrounding cellulitis.  2. No purulence encountered within IPG pocket.  3. Right-sided IPG and lead removed without complication.    Estimated Blood Loss: < 5 mL    Drains:   1. 7 Fr Imtiaz-Barragan drain    Procedure in detail:  After the risks and benefits of the procedure were explained, informed consent was obtained. The patient was taken to the operating suite and placed in the prone position.  Anesthesia was administered.      The generator  Provider at bedside       Vee Jiménez RN  02/18/19 9073 site was located on the patient's right buttock. The incision had complete superficial skin separation with fibrinous exudate within the wound. Compression of the generator site expressed serous fluid. Local anesthetic was used to anesthetize the skin incision overlying the generator. An incision was then made sharply with a 10 blade. We then sharply incised the deeper layers until the generator was encountered. There was no purulence encountered within the pocket. We then collected a culture swab within the generator site which was then passed off the field as a specimen. The generator was delivered out of the incision.     The lead was identified and protected. Hemostasis was achieved using bovie electrocautery. The lead was sharply detached from the generator. The generator was removed and passed off the field.    The lead was then pulled gently and we identified the previous midline incision. There was no surrounding cellulitis or skin separation at this site. This area was infiltrated with local anesthetic. A 10 blade was used to make a skin incision over the identified area. A right angle was then used to deliver the lead out of the midline incision. A hemostat was used to clamp the lead and the lead was brought from the generator site to the midline incision and out the skin. Gentle traction was then used by rotating the hemostat slowly to remove the remainder of the lead.  The entire lead was removed and inspected and found to be completely intact. Hemostasis was achieved using Bovie electrocautery.    A 7 Fr Imtiaz-Barragan drain was placed within the generator site via a separate stab incision situated laterally. The drain was fixated with a 2-0 nylon drain stitch.    The dermis of both incisions was closed using 3-0 vicryl in a simple interrupted fashion. The overlying skin incisions were approximated using simple interrupted 3-0 nylon suture. The drain was hooked up to bulb suction. Sterile dressings were  applied.      The patient tolerated the procedure well and was transferred to the PACU in stable condition.      Disposition:  The patient will be discharged home from PACU. She will follow up with Dr. Calle in 1 week for post-op wound check.    Thierry Salinas MD

## 2022-07-26 NOTE — TRANSFER OF CARE
Anesthesia Transfer of Care Note    Patient: Chelsea Oconnell    Procedure(s) Performed: Procedure(s) (LRB):  REMOVAL, ELECTRODE LEAD, SACRAL NERVE STIMULATOR AND INTERSTIM GENERATOR WOUNDED IRRIGATION (N/A)  WASHOUT (N/A)    Patient location: [facility]    Anesthesia Type: general    Transport from OR: Transported from OR on 6-10 L/min O2 by face mask with adequate spontaneous ventilation    Post pain: adequate analgesia    Post assessment: no apparent anesthetic complications    Post vital signs: stable    Level of consciousness: awake and alert    Nausea/Vomiting: no nausea/vomiting    Complications: none    Transfer of care protocol was followed      Last vitals:   Visit Vitals  /78 (BP Location: Right arm, Patient Position: Lying)   Pulse 77   Temp 36.8 °C (98.3 °F) (Oral)   Resp 16   SpO2 100%   Breastfeeding No

## 2022-07-26 NOTE — PLAN OF CARE
Pt prepped for surgery. Pt has allergy to vancomycin, notified dr. paz with surgery team who ordered ancef. Confirmed with Dr. Paz that both ordered antibiotics are to be administered 30 min prior to procedure. Pt has bandage with gauze/tegaderm over right lower back wound. Dressing is C/D/I. Pt's father is at bedside and to take purse. Clothing placed in locker.

## 2022-07-26 NOTE — PROGRESS NOTES
Notes:   CC: open wound on the left side InterStim with persistent drainage    Chelsea Oconnell is a 41 y.o. woman who is here for a wound check following Interstim revision 2 wks ago.     Went to the ER because of the open wound last week after she returned home.  She noticed some clear slightly blood body fluid drainage from the wound.  No fever or chills or pus drainage from the wound.  However, reports yellowish clear fluid drainage from the wound.    Chelsea Oconnell is a 41 y.o. female with refractory UMESH, non-obstructive UR, urgency, frequency and urge urinary incontinence, s/p interstim therapy in 2019 with moderate improvement, IC/pelvic pain.  This has been refractory to medical management.  The patient has decided to pursue repeat neuromodulation therapy.      Date: 07/13/2022     Pre-Op Diagnosis: urinary urgency, frequency, urge urinary incontinence, Pelvis Pain/IC, incomplete bladder emptying, non-obstructive urinary retention.        Past Medical History:   Diagnosis Date    Crohn's colitis      Lupus (systemic lupus erythematosus)     Procedure(s) Performed:   1.  Incision for implantation of neurostimulator electrodes, sacral nerve (transforamenal placement) Bilateral  2.  Insertion of peripheral neurostimulator pulse generator (Bilateral)  3.  Fluoro < 1 h  4.  Electronic analysis of implanted neurostimulator pulse generator system with intraoperative programming  5.  Removal of previous interstim (right lead and left IPG)   Findings:   Lead placed on left and right side ( both new InterStim electrodes)  Generator placed on left and right side ( the new InterStim X on the left side, the old InterStim generator removed from previous left side and placed on the right side)  All systems are now MRI compatible.  The old InterStim generator had 2 to 4 years of battery life left and reused on the right side.  Good sensory and motor function c/w S3 stimulation seen  The rolling pen  technique was used to localize S3  Previous interstim device with lead on right and IPG on left removed.     Following her InterStim revision, she is able to void on her own better.  Feels the proper stimulation in vaginal area.  No discomfort there.  However, c/o open wound.      Past Medical History:   Diagnosis Date    Crohn's colitis     Lupus (systemic lupus erythematosus)      Past Surgical History:   Procedure Laterality Date    CYSTOSCOPY WITH HYDRODISTENSION AND BIOPSY OF BLADDER N/A 6/5/2019    Procedure: CYSTOSCOPY, WITH BLADDER HYDRODISTENSION AND BIOPSY;  Surgeon: Donny Calle MD;  Location: [facility] F[facility];  Service: Urology;  Laterality: N/A;  1 hour    DILATION OF URETHRA N/A 6/5/2019    Procedure: DILATION, URETHRA;  Surgeon: Donny Calle MD;  [facility];  Service: Urology;  Laterality: N/A;    FLUOROSCOPIC URODYNAMIC STUDY N/A 6/5/2019    Procedure: URODYNAMIC STUDY, FLUOROSCOPIC;  Surgeon: Donny Calle MD;  [facility];  Service: Urology;  Laterality: N/A;  1 hour    FLUOROSCOPY N/A 7/31/2019    Procedure: FLUOROSCOPY;  Surgeon: Donny Calle MD;  Location: [facility] OR 2ND FLR;  Service: Urology;  Laterality: N/A;    HYSTERECTOMY      INSERTION OF SACRAL NEUROSTIMULATOR GENERATOR Left 8/14/2019    Procedure: INSERTION, PULSE GENERATOR, NEUROSTIMULATOR, SACRAL Stage2;  Surgeon: Donny Calle MD;  [facility];  Service: Urology;  Laterality: Left;    INSERTION OF SACRAL NEUROSTIMULATOR, ELECTRODES, AND IMPLANTABLE PULSE GENERATOR (IPG) Right 7/31/2019    Procedure: INSERTION, ELECTRODE LEAD, NEUROSTIMULATOR, SACRAL;  Surgeon: Donny Calle MD;  Location: [facility] OR 2ND FLR;  Service: Urology;  Laterality: Right;    INSTILLATION OF URINARY BLADDER N/A 6/5/2019    Procedure: INSTILLATION, BLADDER;  Surgeon: Donny Calle MD;  Location: [facility] OR 1ST FLR;  Service: Urology;  Laterality: N/A;    liver      hemangioma removed    PERCUTANEOUS  "INSERTION OF SACRAL NERVE NEUROSTIMULATOR ELECTRODE LEAD Left 7/13/2022    Procedure: INSERTION, ELECTRODE LEAD, NEUROSTIMULATOR, SACRAL, PERCUTANEOUS INTERSTIM X;  Surgeon: Donny Calle MD;  Location: [facility] OR [address] [place] [address];  Service: Urology;  Laterality: Left;  63514 code  50871 code  85144 code    REVISION OF PROCEDURE INVOLVING SACRAL NEUROSTIMULATOR DEVICE Right 7/13/2022    Procedure: REVISION, NEUROSTIMULATOR, SACRAL;  Surgeon: Donny Calle MD;  Location: [facility] OR [address] [place] [address];  Service: Urology;  Laterality: Right;  1.35  new wire  3058 old battery  10562 code  25406 code  80302 code     Social History     Tobacco Use    Smoking status: Never Smoker    Smokeless tobacco: Never Used   Substance Use Topics    Alcohol use: Never    Drug use: Never     No family history on file.  Allergy:  Review of patient's allergies indicates:   Allergen Reactions    Iodine and iodide containing products Rash and Swelling    Shellfish containing products Rash and Swelling    Sulfa (sulfonamide antibiotics) Rash and Swelling    Adhesive Hives     Had reaction to surgical tape after last procedure    Sertraline Hives    Xanax [alprazolam] Hives    Vancomycin analogues Itching     Pt had itching, redness to neck/face, and "feeling hot"      Facility-Administered Encounter Medications as of 7/26/2022   Medication Dose Route Frequency Provider Last Rate Last Admin    0.9%  NaCl infusion   Intravenous Continuous Akil Rosen MD   Stopped at 07/26/22 1344    LIDOcaine (PF) 10 mg/ml (1%) injection 10 mg  1 mL Intradermal Once Akil Rosen MD         Outpatient Encounter Medications as of 7/26/2022   Medication Sig Dispense Refill    amitriptyline (ELAVIL) 25 MG tablet Take 1 tablet (25 mg total) by mouth nightly as needed for Insomnia. One by mouth every evening at bedtime to help with bladder pain and sleeping 90 tablet 3    cephALEXin (KEFLEX) 500 MG capsule Take 1 capsule (500 mg total) by mouth every 6 (six) " "hours. for 7 days 28 capsule 0    doxazosin (CARDURA) 1 MG tablet Take 1 tablet (1 mg total) by mouth every evening. 30 tablet 11    ESTRADIOL TRANSDERMAL PATCH TD Place onto the skin.      gabapentin (NEURONTIN) 300 MG capsule Take 1 capsule (300 mg total) by mouth 3 (three) times daily. 90 capsule 11    hydrOXYchloroQUINE (PLAQUENIL) 200 mg tablet Take 200 mg by mouth once daily. TAKES MID DAY      Lactobacillus rhamnosus GG (CULTURELLE) 10 billion cell capsule Take 1 capsule by mouth once daily.      LORazepam (ATIVAN) 1 MG tablet Take 1 tablet (1 mg total) by mouth nightly as needed for Anxiety. Take 1 tablet by mouth 30 mins before the procedure 30 tablet 1    multivitamin-Ca-iron-minerals 27-0.4 mg Tab Take by mouth once daily.      omega-3 acid ethyl esters (LOVAZA) 1 gram capsule Take 1 g by mouth once daily.      phenazopyridine (PYRIDIUM) 100 MG tablet Take 2 tablets (200 mg total) by mouth 3 (three) times daily as needed for Pain (bladder pain). 30 tablet 11    tamsulosin (FLOMAX) 0.4 mg Cap Take 1 capsule (0.4 mg total) by mouth 2 (two) times a day. 180 capsule 3    traMADoL (ULTRAM) 50 mg tablet Take 1 tablet (50 mg total) by mouth every 4 (four) hours as needed for Pain. 30 tablet 0    urinary bag-catheter (VAPRO PLUS INTERMITT CATHETER) 12 Fr- 8" Cmpk 1 Units by Misc.(Non-Drug; Combo Route) route 6 (six) times daily. 180 each 99    venlafaxine (EFFEXOR-XR) 75 MG 24 hr capsule Take by mouth every morning.      [DISCONTINUED] oxyCODONE (ROXICODONE) 5 MG immediate release tablet Take 1 tablet (5 mg total) by mouth every 4 (four) hours as needed for Pain. 10 tablet 0     Review of Systems   ROS  Physical Exam     There were no vitals filed for this visit.  Physical Exam  Constitutional:       General: She is not in acute distress.     Appearance: She is well-developed. She is not diaphoretic.   HENT:      Head: Normocephalic and atraumatic.      Right Ear: External ear normal.      Left Ear: " External ear normal.      Nose: Nose normal.   Eyes:      General: No scleral icterus.     Conjunctiva/sclera: Conjunctivae normal.      Pupils: Pupils are equal, round, and reactive to light.   Neck:      Thyroid: No thyromegaly.      Vascular: No JVD.      Trachea: No tracheal deviation.   Cardiovascular:      Rate and Rhythm: Normal rate and regular rhythm.      Heart sounds: Normal heart sounds. No murmur heard.    No friction rub. No gallop.   Pulmonary:      Effort: Pulmonary effort is normal. No respiratory distress.      Breath sounds: Normal breath sounds. No wheezing.   Abdominal:      General: Bowel sounds are normal. There is no distension.      Palpations: Abdomen is soft. There is no mass.      Tenderness: There is no abdominal tenderness. There is no guarding or rebound.   Genitourinary:     Vagina: Normal. No vaginal discharge.      Comments: The skin overlying the generator at the right hip area is open. No exposed generator.  However, there is a pin hole in the middle of the wound draining clear fluid.  No obvious cellulitis but is tender to palpate including the the mid wound over the left electrode.    The right side InterStim Therapy sites well healed.  Non-tender  Musculoskeletal:         General: No tenderness or deformity. Normal range of motion.      Cervical back: Normal range of motion and neck supple.   Lymphadenopathy:      Cervical: No cervical adenopathy.   Skin:     General: Skin is warm and dry.   Neurological:      Mental Status: She is alert and oriented to person, place, and time.   Psychiatric:         Behavior: Behavior normal.         Thought Content: Thought content normal.         LABS:  Lab Results   Component Value Date    CREATININE 0.8 05/07/2019     Urine Culture, Routine   Date Value Ref Range Status   06/02/2020 No growth  Final       Assessment and Plan:  Chelsea was seen today for post-op evaluation.    Diagnoses and all orders for this visit:    Infection  associated with generator of implanted electronic neurostimulator, initial encounter    s/p bilateral InterStim Therapy.  The right side is well healed.  The left side wound is  on the skin with a small pin hole draining fluid.  Positive tenderness over the left side devices.  Suspect infection of the left InterStim Therapy ( both generator and electrode).  Will schedule her to take her to the OR today for explantation.    She has been on  Keflex.  IV abx coverage.  Depending on how the wound looks, I may need to keep her overnight for IV abx coverage.    So far the new Left side InterStim Therapy has worked well.  The right side has been turned off due to pain.    She takes Lyrica on a regular basis.  Start Neurontin 300 mg TID.  Start Tramadol 50 mg every 4 to 6 hours as needed.  Continue flomax and increase it to BID.  Take Ativan nightly to relax her pelvic muscles to see whether it will improve her bladder symptoms.    I spent 25 minutes with the patient of which more than half was spent in direct consultation with the patient in regards to our treatment and plan.    Follow-up:  Follow up OR today for explantation of the right InterStim Therapy.

## 2022-07-26 NOTE — TELEPHONE ENCOUNTER
Infection associated with generator of implanted electronic neurostimulator, initial encounter  -     Case Request Operating Room: REMOVAL, ELECTRODE LEAD, SACRAL NERVE STIMULATOR AND INTERSTIM GENERATOR  WOUNDED IRRIGATION

## 2022-07-26 NOTE — INTERVAL H&P NOTE
The patient has been examined and the H&P has been reviewed:    I concur with the findings and no changes have occurred since H&P was written.    Surgery risks, benefits and alternative options discussed and understood by patient/family.  Consented for device removal and wound washout.        Active Hospital Problems    Diagnosis  POA    *Infection associated with generator of implanted electronic neurostimulator, left side [L53.604R]  Yes      Resolved Hospital Problems   No resolved problems to display.

## 2022-07-26 NOTE — ANESTHESIA POSTPROCEDURE EVALUATION
Anesthesia Post Evaluation    Patient: Chelsea Oconnell    Procedure(s) Performed: Procedure(s) (LRB):  REMOVAL, ELECTRODE LEAD, SACRAL NERVE STIMULATOR AND INTERSTIM GENERATOR WOUNDED IRRIGATION (N/A)  WASHOUT (N/A)    Final Anesthesia Type: general      Patient location during evaluation: PACU  Patient participation: Yes- Able to Participate  Level of consciousness: awake and alert  Post-procedure vital signs: reviewed and stable  Pain management: adequate  Airway patency: patent  DWIGHT mitigation strategies: Multimodal analgesia  PONV status at discharge: No PONV  Anesthetic complications: no      Cardiovascular status: blood pressure returned to baseline and hemodynamically stable  Respiratory status: unassisted  Hydration status: euvolemic  Follow-up not needed.          Vitals Value Taken Time   /74 07/26/22 1617   Temp 37 07/26/22 1619   Pulse 62 07/26/22 1617   Resp 18 07/26/22 1548   SpO2 100 % 07/26/22 1617   Vitals shown include unvalidated device data.      No case tracking events are documented in the log.      Pain/Tatum Score: Pain Rating Prior to Med Admin: 6 (7/26/2022  3:54 PM)  Tatum Score: 9 (7/26/2022  3:17 PM)

## 2022-07-26 NOTE — ANESTHESIA PREPROCEDURE EVALUATION
"                                                                                                             07/26/2022  Pre-operative evaluation for Procedure(s) (LRB):  REMOVAL, ELECTRODE LEAD, SACRAL NERVE STIMULATOR AND INTERSTIM GENERATOR WOUNDED IRRIGATION (N/A)    Chelsea Oconnell is a 41 y.o. female w/ pmhx of SLE, Crohn's, insomnia, depression and incomplete bladder emptying. She is s/p sacral neurostimulator placement 7/2019. Now with infection, here for removal of stimulator.     Patient Active Problem List   Diagnosis    IC (interstitial cystitis)    Chronic bladder pain    OAB (overactive bladder)    Incomplete bladder emptying    Pelvic pain in female    Overactive bladder    Fibromyalgia    Mechanical breakdown of generator of implanted electronic neurostimulator    Open wound of right buttock    Infection associated with generator of implanted electronic neurostimulator, left side       Review of patient's allergies indicates:   Allergen Reactions    Iodine and iodide containing products Rash and Swelling    Shellfish containing products Rash and Swelling    Sulfa (sulfonamide antibiotics) Rash and Swelling    Adhesive Hives     Had reaction to surgical tape after last procedure    Sertraline Hives    Xanax [alprazolam] Hives    Vancomycin analogues Itching     Pt had itching, redness to neck/face, and "feeling hot"        Current Facility-Administered Medications on File Prior to Encounter   Medication Dose Route Frequency Provider Last Rate Last Admin    0.9%  NaCl infusion   Intravenous Continuous Akil Rosen MD 10 mL/hr at 07/13/22 1159 New Bag at 07/13/22 1159    LIDOcaine (PF) 10 mg/ml (1%) injection 10 mg  1 mL Intradermal Once Akil Rosen MD         Current Outpatient Medications on File Prior to Encounter   Medication Sig Dispense Refill    amitriptyline (ELAVIL) 25 MG tablet Take 1 tablet (25 mg total) by mouth nightly as needed for Insomnia. One by " "mouth every evening at bedtime to help with bladder pain and sleeping 90 tablet 3    cephALEXin (KEFLEX) 500 MG capsule Take 1 capsule (500 mg total) by mouth every 6 (six) hours. for 7 days 28 capsule 0    ESTRADIOL TRANSDERMAL PATCH TD Place onto the skin.      gabapentin (NEURONTIN) 300 MG capsule Take 1 capsule (300 mg total) by mouth 3 (three) times daily. 90 capsule 11    hydrOXYchloroQUINE (PLAQUENIL) 200 mg tablet Take 200 mg by mouth once daily. TAKES MID DAY      Lactobacillus rhamnosus GG (CULTURELLE) 10 billion cell capsule Take 1 capsule by mouth once daily.      LORazepam (ATIVAN) 1 MG tablet Take 1 tablet (1 mg total) by mouth nightly as needed for Anxiety. Take 1 tablet by mouth 30 mins before the procedure 30 tablet 1    multivitamin-Ca-iron-minerals 27-0.4 mg Tab Take by mouth once daily.      omega-3 acid ethyl esters (LOVAZA) 1 gram capsule Take 1 g by mouth once daily.      phenazopyridine (PYRIDIUM) 100 MG tablet Take 2 tablets (200 mg total) by mouth 3 (three) times daily as needed for Pain (bladder pain). 30 tablet 11    tamsulosin (FLOMAX) 0.4 mg Cap Take 1 capsule (0.4 mg total) by mouth 2 (two) times a day. 180 capsule 3    traMADoL (ULTRAM) 50 mg tablet Take 1 tablet (50 mg total) by mouth every 4 (four) hours as needed for Pain. 30 tablet 0    venlafaxine (EFFEXOR-XR) 75 MG 24 hr capsule Take by mouth every morning.      doxazosin (CARDURA) 1 MG tablet Take 1 tablet (1 mg total) by mouth every evening. 30 tablet 11    oxyCODONE (ROXICODONE) 5 MG immediate release tablet Take 1 tablet (5 mg total) by mouth every 4 (four) hours as needed for Pain. 10 tablet 0    urinary bag-catheter (VAPRO PLUS INTERMITT CATHETER) 12 Fr- 8" Cmpk 1 Units by Misc.(Non-Drug; Combo Route) route 6 (six) times daily. 180 each 99       Past Surgical History:   Procedure Laterality Date    CYSTOSCOPY WITH HYDRODISTENSION AND BIOPSY OF BLADDER N/A 6/5/2019    Procedure: CYSTOSCOPY, WITH BLADDER " HYDRODISTENSION AND BIOPSY;  Surgeon: Donny Calle MD;  Location: [facility] OR 1ST FLR;  Service: Urology;  Laterality: N/A;  1 hour    DILATION OF URETHRA N/A 6/5/2019    Procedure: DILATION, URETHRA;  Surgeon: Donny Calle MD;  Location: [facility] OR 1ST FLR;  Service: Urology;  Laterality: N/A;    FLUOROSCOPIC URODYNAMIC STUDY N/A 6/5/2019    Procedure: URODYNAMIC STUDY, FLUOROSCOPIC;  Surgeon: Donny Calle MD;  Location: [facility] OR [facility]LR;  Service: Urology;  Laterality: N/A;  1 hour    FLUOROSCOPY N/A 7/31/2019    Procedure: FLUOROSCOPY;  Surgeon: Donny Calle MD;  [facility];  Service: Urology;  Laterality: N/A;    HYSTERECTOMY      INSERTION OF SACRAL NEUROSTIMULATOR GENERATOR Left 8/14/2019    Procedure: INSERTION, PULSE GENERATOR, NEUROSTIMULATOR, SACRAL Stage2;  Surgeon: Donny Calle MD;  [facility];  Service: Urology;  Laterality: Left;    INSERTION OF SACRAL NEUROSTIMULATOR, ELECTRODES, AND IMPLANTABLE PULSE GENERATOR (IPG) Right 7/31/2019    Procedure: INSERTION, ELECTRODE LEAD, NEUROSTIMULATOR, SACRAL;  Surgeon: Donny Calle MD;  [facility];  Service: Urology;  Laterality: Right;    INSTILLATION OF URINARY BLADDER N/A 6/5/2019    Procedure: INSTILLATION, BLADDER;  Surgeon: Donny Calle MD;  [facility];  Service: Urology;  Laterality: N/A;    liver      hemangioma removed    PERCUTANEOUS INSERTION OF SACRAL NERVE NEUROSTIMULATOR ELECTRODE LEAD Left 7/13/2022    Procedure: INSERTION, ELECTRODE LEAD, NEUROSTIMULATOR, SACRAL, PERCUTANEOUS INTERSTIM X;  Surgeon: Donny Calle MD;  [facility];  Service: Urology;  Laterality: Left;  56401 code  81028 code  58267 code    REVISION OF PROCEDURE INVOLVING SACRAL NEUROSTIMULATOR DEVICE Right 7/13/2022    Procedure: REVISION, NEUROSTIMULATOR, SACRAL;  Surgeon: Donny Calle MD;  [facility];  Service: Urology;  Laterality: Right;  1.35  new wire  3058 old  [facility]  40743 code  54034 code  58954 code       Social History     Socioeconomic History    Marital status:    Tobacco Use    Smoking status: Never Smoker    Smokeless tobacco: Never Used   Substance and Sexual Activity    Alcohol use: Never    Drug use: Never         CBC: No results for input(s): WBC, RBC, HGB, HCT, PLT, MCV, MCH, MCHC in the last 72 hours.    CMP: No results for input(s): NA, K, CL, CO2, BUN, CREATININE, GLU, MG, PHOS, CALCIUM, ALBUMIN, PROT, ALKPHOS, ALT, AST, BILITOT in the last 72 hours.    INR  No results for input(s): PT, INR, PROTIME, APTT in the last 72 hours.          2D Echo:  No results found for this or any previous visit.      Pre-op Assessment    I have reviewed the Patient Summary Reports.     I have reviewed the Nursing Notes. I have reviewed the NPO Status.   I have reviewed the Medications.     Review of Systems      Physical Exam  General: Well nourished and Cooperative    Airway:  Mallampati: II   Mouth Opening: Normal  TM Distance: Normal  Tongue: Normal  Neck ROM: Normal ROM    Chest/Lungs:  Clear to auscultation, Normal Respiratory Rate    Heart:  Rate: Normal  Rhythm: Regular Rhythm  Sounds: Normal        Anesthesia Plan  Type of Anesthesia, risks & benefits discussed:    Anesthesia Type: Gen Natural Airway, MAC  Intra-op Monitoring Plan: Standard ASA Monitors  Post Op Pain Control Plan: multimodal analgesia and IV/PO Opioids PRN  Induction:  IV  Airway Plan: , Post-Induction  Informed Consent: Informed consent signed with the Patient and all parties understand the risks and agree with anesthesia plan.  All questions answered.   ASA Score: 3    Ready For Surgery From Anesthesia Perspective.     .

## 2022-07-27 ENCOUNTER — PATIENT MESSAGE (OUTPATIENT)
Dept: UROLOGY | Facility: CLINIC | Age: 42
End: 2022-07-27
Payer: COMMERCIAL

## 2022-07-28 LAB — BACTERIA SPEC AEROBE CULT: NORMAL

## 2022-08-01 LAB — BACTERIA SPEC ANAEROBE CULT: NORMAL

## 2022-08-04 ENCOUNTER — OFFICE VISIT (OUTPATIENT)
Dept: UROLOGY | Facility: CLINIC | Age: 42
End: 2022-08-04
Payer: COMMERCIAL

## 2022-08-04 VITALS
SYSTOLIC BLOOD PRESSURE: 100 MMHG | DIASTOLIC BLOOD PRESSURE: 70 MMHG | HEART RATE: 113 BPM | BODY MASS INDEX: 30.02 KG/M2 | HEIGHT: 62 IN | WEIGHT: 163.13 LBS

## 2022-08-04 DIAGNOSIS — T85.734D INFECTION ASSOCIATED WITH GENERATOR OF IMPLANTED ELECTRONIC NEUROSTIMULATOR, SUBSEQUENT ENCOUNTER: Primary | ICD-10-CM

## 2022-08-04 PROCEDURE — 3008F BODY MASS INDEX DOCD: CPT | Mod: CPTII,S$GLB,, | Performed by: UROLOGY

## 2022-08-04 PROCEDURE — 99499 UNLISTED E&M SERVICE: CPT | Mod: S$GLB,,, | Performed by: UROLOGY

## 2022-08-04 PROCEDURE — 3078F PR MOST RECENT DIASTOLIC BLOOD PRESSURE < 80 MM HG: ICD-10-PCS | Mod: CPTII,S$GLB,, | Performed by: UROLOGY

## 2022-08-04 PROCEDURE — 99999 PR PBB SHADOW E&M-EST. PATIENT-LVL III: ICD-10-PCS | Mod: PBBFAC,,, | Performed by: UROLOGY

## 2022-08-04 PROCEDURE — 3078F DIAST BP <80 MM HG: CPT | Mod: CPTII,S$GLB,, | Performed by: UROLOGY

## 2022-08-04 PROCEDURE — 3074F PR MOST RECENT SYSTOLIC BLOOD PRESSURE < 130 MM HG: ICD-10-PCS | Mod: CPTII,S$GLB,, | Performed by: UROLOGY

## 2022-08-04 PROCEDURE — 3074F SYST BP LT 130 MM HG: CPT | Mod: CPTII,S$GLB,, | Performed by: UROLOGY

## 2022-08-04 PROCEDURE — 99999 PR PBB SHADOW E&M-EST. PATIENT-LVL III: CPT | Mod: PBBFAC,,, | Performed by: UROLOGY

## 2022-08-04 PROCEDURE — 99499 NO LOS: ICD-10-PCS | Mod: S$GLB,,, | Performed by: UROLOGY

## 2022-08-04 PROCEDURE — 3008F PR BODY MASS INDEX (BMI) DOCUMENTED: ICD-10-PCS | Mod: CPTII,S$GLB,, | Performed by: UROLOGY

## 2022-08-04 NOTE — PROGRESS NOTES
Notes:   CC: open wound on the left side InterStim with persistent drainage    Chelsea Oconnell is a 41 y.o. female who recently underwent placement of bilateral InterStim leads and IPG's. Now, there is concern for infection involving her right-sided IPG and lead. She is presenting for surgical intervention.      Procedure(s) Performed: 7/26/22  1.  Removal without replacement of neurostimulator electrode array (right side) (30227)  2.  Removal without replacement of implantable pulse generator (right side) (30602)  Findings:    1. Right-sided IPG incision with complete superficial skin separation, fibrinous exudate within wound, and drainage of serous fluid. No surrounding cellulitis.  2. No purulence encountered within IPG pocket.  3. Right-sided IPG and lead removed without complication.  Drains:   1. 7 Fr Imtiaz-Barragan drain  She has been on keflex  No fever or chills.  Drainage output has been minimal ; less than 15 ml per day.  Wound culture: skin bessie.  No bacteria grew.    Chelsea Oconnell is a 41 y.o. female with refractory UMESH, non-obstructive UR, urgency, frequency and urge urinary incontinence, s/p interstim therapy in 2019 with moderate improvement, IC/pelvic pain.  This has been refractory to medical management.  The patient has decided to pursue repeat neuromodulation therapy.      Date: 07/13/2022  Pre-Op Diagnosis: urinary urgency, frequency, urge urinary incontinence, Pelvis Pain/IC, incomplete bladder emptying, non-obstructive urinary retention.        Past Medical History:   Diagnosis Date    Crohn's colitis      Lupus (systemic lupus erythematosus)     Procedure(s) Performed:   1.  Incision for implantation of neurostimulator electrodes, sacral nerve (transforamenal placement) Bilateral  2.  Insertion of peripheral neurostimulator pulse generator (Bilateral)  3.  Fluoro < 1 h  4.  Electronic analysis of implanted neurostimulator pulse generator system with intraoperative  programming  5.  Removal of previous interstim (right lead and left IPG)   Findings:   Lead placed on left and right side ( both new InterStim electrodes)  Generator placed on left and right side ( the new InterStim X on the left side, the old InterStim generator removed from previous left side and placed on the right side)  All systems are now MRI compatible.  The old InterStim generator had 2 to 4 years of battery life left and reused on the right side.  Good sensory and motor function c/w S3 stimulation seen  The rolling pen technique was used to localize S3  Previous interstim device with lead on right and IPG on left removed.     Following her InterStim revision, she is able to void on her own better.  Feels the proper stimulation in vaginal area.  No discomfort there.  However, c/o open wound.      Past Medical History:   Diagnosis Date    Crohn's colitis     Lupus (systemic lupus erythematosus)      Past Surgical History:   Procedure Laterality Date    CYSTOSCOPY WITH HYDRODISTENSION AND BIOPSY OF BLADDER N/A 6/5/2019    Procedure: CYSTOSCOPY, WITH BLADDER HYDRODISTENSION AND BIOPSY;  Surgeon: Donny Calle MD;  Location: [facility] OR [address] [place] [address];  Service: Urology;  Laterality: N/A;  1 hour    DILATION OF URETHRA N/A 6/5/2019    Procedure: DILATION, URETHRA;  Surgeon: Donny Calle MD;  [facility];  Service: Urology;  Laterality: N/A;    FLUOROSCOPIC URODYNAMIC STUDY N/A 6/5/2019    Procedure: URODYNAMIC STUDY, FLUOROSCOPIC;  Surgeon: Donny Calle MD;  Location: [facility] OR 1ST FLR;  Service: Urology;  Laterality: N/A;  1 hour    FLUOROSCOPY N/A 7/31/2019    Procedure: FLUOROSCOPY;  Surgeon: Donny Calle MD;  Location: [facility] OR 2ND FLR;  Service: Urology;  Laterality: N/A;    HYSTERECTOMY      INSERTION OF SACRAL NEUROSTIMULATOR GENERATOR Left 8/14/2019    Procedure: INSERTION, PULSE GENERATOR, NEUROSTIMULATOR, SACRAL Stage2;  Surgeon: Donny Calle MD;  Location: [facility] OR 2ND FLR;  Service:  Urology;  Laterality: Left;    INSERTION OF SACRAL NEUROSTIMULATOR, ELECTRODES, AND IMPLANTABLE PULSE GENERATOR (IPG) Right 7/31/2019    Procedure: INSERTION, ELECTRODE LEAD, NEUROSTIMULATOR, SACRAL;  Surgeon: Donny Calle MD;  [facility];  Service: Urology;  Laterality: Right;    INSTILLATION OF URINARY BLADDER N/A 6/5/2019    Procedure: INSTILLATION, BLADDER;  Surgeon: Donny Calle MD;  Location: [facility] OR 1ST FLR;  Service: Urology;  Laterality: N/A;    liver      hemangioma removed    PERCUTANEOUS INSERTION OF SACRAL NERVE NEUROSTIMULATOR ELECTRODE LEAD Left 7/13/2022    Procedure: INSERTION, ELECTRODE LEAD, NEUROSTIMULATOR, SACRAL, PERCUTANEOUS INTERSTIM X;  Surgeon: Donny Calle MD;  [facility];  Service: Urology;  Laterality: Left;  46440 code  80039 code  06324 code    REMOVAL OF ELECTRODE LEAD OF SACRAL NERVE STIMULATOR N/A 7/26/2022    Procedure: REMOVAL, ELECTRODE LEAD, SACRAL NERVE STIMULATOR AND INTERSTIM GENERATOR WOUNDED IRRIGATION;  Surgeon: Donny Calle MD;  [facility];  Service: Urology;  Laterality: N/A;  45MIN    REVISION OF PROCEDURE INVOLVING SACRAL NEUROSTIMULATOR DEVICE Right 7/13/2022    Procedure: REVISION, NEUROSTIMULATOR, SACRAL;  Surgeon: Donny Calle MD;  Location: [facility] OR [address] [place] [address];  Service: Urology;  Laterality: Right;  1.35  new wire  3058 old battery  87744 code  65763 code  08765 code     Social History     Tobacco Use    Smoking status: Never Smoker    Smokeless tobacco: Never Used   Substance Use Topics    Alcohol use: Never    Drug use: Never     No family history on file.  Allergy:  Review of patient's allergies indicates:   Allergen Reactions    Iodine and iodide containing products Rash and Swelling    Shellfish containing products Rash and Swelling    Sulfa (sulfonamide antibiotics) Rash and Swelling    Adhesive Hives     Had reaction to surgical tape after last procedure    Sertraline Hives    Xanax  "[alprazolam] Hives    Vancomycin analogues Itching     Pt had itching, redness to neck/face, and "feeling hot"      Outpatient Encounter Medications as of 8/4/2022   Medication Sig Dispense Refill    amitriptyline (ELAVIL) 25 MG tablet Take 1 tablet (25 mg total) by mouth nightly as needed for Insomnia. One by mouth every evening at bedtime to help with bladder pain and sleeping 90 tablet 3    cephALEXin (KEFLEX) 500 MG capsule Take 1 capsule (500 mg total) by mouth every 6 (six) hours. for 10 days 40 capsule 0    doxazosin (CARDURA) 1 MG tablet Take 1 tablet (1 mg total) by mouth every evening. 30 tablet 11    ESTRADIOL TRANSDERMAL PATCH TD Place onto the skin.      gabapentin (NEURONTIN) 300 MG capsule Take 1 capsule (300 mg total) by mouth 3 (three) times daily. 90 capsule 11    hydrOXYchloroQUINE (PLAQUENIL) 200 mg tablet Take 200 mg by mouth once daily. TAKES MID DAY      Lactobacillus rhamnosus GG (CULTURELLE) 10 billion cell capsule Take 1 capsule by mouth once daily.      LORazepam (ATIVAN) 1 MG tablet Take 1 tablet (1 mg total) by mouth nightly as needed for Anxiety. Take 1 tablet by mouth 30 mins before the procedure 30 tablet 1    multivitamin-Ca-iron-minerals 27-0.4 mg Tab Take by mouth once daily.      omega-3 acid ethyl esters (LOVAZA) 1 gram capsule Take 1 g by mouth once daily.      oxyCODONE (ROXICODONE) 5 MG immediate release tablet Take 1 tablet (5 mg total) by mouth every 6 (six) hours as needed for Pain. 5 tablet 0    phenazopyridine (PYRIDIUM) 100 MG tablet Take 2 tablets (200 mg total) by mouth 3 (three) times daily as needed for Pain (bladder pain). 30 tablet 11    tamsulosin (FLOMAX) 0.4 mg Cap Take 1 capsule (0.4 mg total) by mouth 2 (two) times a day. 180 capsule 3    traMADoL (ULTRAM) 50 mg tablet Take 1 tablet (50 mg total) by mouth every 4 (four) hours as needed for Pain. 30 tablet 0    urinary bag-catheter (VAPRO PLUS INTERMITT CATHETER) 12 Fr- 8" Cmpk 1 Units by " Misc.(Non-Drug; Combo Route) route 6 (six) times daily. 180 each 99    venlafaxine (EFFEXOR-XR) 75 MG 24 hr capsule Take by mouth every morning.       Facility-Administered Encounter Medications as of 8/4/2022   Medication Dose Route Frequency Provider Last Rate Last Admin    0.9%  NaCl infusion   Intravenous Continuous Akil Rosen MD   Stopped at 07/26/22 1344    LIDOcaine (PF) 10 mg/ml (1%) injection 10 mg  1 mL Intradermal Once Akil Rosen MD         Review of Systems   ROS  Physical Exam     Vitals:    08/04/22 1356   BP: 100/70   Pulse: (!) 113     Physical Exam  Constitutional:       General: She is not in acute distress.     Appearance: She is well-developed. She is not diaphoretic.   HENT:      Head: Normocephalic and atraumatic.      Right Ear: External ear normal.      Left Ear: External ear normal.      Nose: Nose normal.   Eyes:      General: No scleral icterus.     Conjunctiva/sclera: Conjunctivae normal.      Pupils: Pupils are equal, round, and reactive to light.   Neck:      Thyroid: No thyromegaly.      Vascular: No JVD.      Trachea: No tracheal deviation.   Cardiovascular:      Rate and Rhythm: Normal rate and regular rhythm.      Heart sounds: Normal heart sounds. No murmur heard.    No friction rub. No gallop.   Pulmonary:      Effort: Pulmonary effort is normal. No respiratory distress.      Breath sounds: Normal breath sounds. No wheezing.   Abdominal:      General: Bowel sounds are normal. There is no distension.      Palpations: Abdomen is soft. There is no mass.      Tenderness: There is no abdominal tenderness. There is no guarding or rebound.   Genitourinary:     Vagina: Normal. No vaginal discharge.      Comments: The skin overlying the generator at the right hip area is open. No exposed generator.  However, there is a pin hole in the middle of the wound draining clear fluid.  No obvious cellulitis but is tender to palpate including the the mid wound over the left  electrode.    The right side InterStim Therapy sites well healed.  Non-tender  Musculoskeletal:         General: No tenderness or deformity. Normal range of motion.      Cervical back: Normal range of motion and neck supple.   Lymphadenopathy:      Cervical: No cervical adenopathy.   Skin:     General: Skin is warm and dry.   Neurological:      Mental Status: She is alert and oriented to person, place, and time.   Psychiatric:         Behavior: Behavior normal.         Thought Content: Thought content normal.         LABS:  Lab Results   Component Value Date    CREATININE 0.8 05/07/2019     Urine Culture, Routine   Date Value Ref Range Status   06/02/2020 No growth  Final       Assessment and Plan:  Diagnoses and all orders for this visit:    Infection associated with generator of implanted electronic neurostimulator, subsequent encounter    s/p bilateral InterStim Therapy.  The right side is well healed.  The left side wound is healed as well.  Sutures removed and drain removed without any problem.    I spent 25 minutes with the patient of which more than half was spent in direct consultation with the patient in regards to our treatment and plan.    Follow-up:  Follow up if symptoms worsen or fail to improve.

## 2022-08-31 LAB — FUNGUS SPEC CULT: NORMAL

## 2022-10-07 NOTE — PROGRESS NOTES
[employer] SNM Registry Study  IRB# 2014.240  PI: Dr. Donny Calle    Subject # [id number]    Date: July 26, 2022    Visit: Event and Modification    Patient was taken to the OR by Dr. Calle for SNM Neurostimulator and lead explant on right side due to stimulator site infection. See clinic and procedure notes.  The following procedures/details were recorded:    Surgery was performed at [facility] by Dr. Donny Calle.  The  modified device serial number is [id number].   Methods used to prevent surgical site infection are pre, intra and post op antibiotics.  Lead information and details. Right side lead explanted, lot number [id number]    All of the above were performed after consent.

## 2022-10-07 NOTE — PROGRESS NOTES
[employer] SNM Registry Study  IRB# 2014.240  PI: Dr. Donny Calle    Subject # [id number]    Date: July 13,2022    Visit: Procedure    Patient was taken to the OR by Dr. Calle for SNM Neurostimulator placement. The following procedures/details were recorded:    Surgery was performed at [facility] by Dr. Donny Calle.  The  new device model number is 01641 InterStim X, the serial number is [id number] and the location is  Left buttock. The existing device in left buttock was moved to the right buttock with a new lead placed.   Methods used to prevent surgical site infection are pre, intra and post op antibiotics.   Lead information and details. New leads at RS3 and LS3 were placed ,lot number [id number]      All of the above were performed after consent.

## 2023-04-11 ENCOUNTER — RESEARCH ENCOUNTER (OUTPATIENT)
Dept: RESEARCH | Facility: HOSPITAL | Age: 43
End: 2023-04-11
Payer: COMMERCIAL

## 2023-04-11 NOTE — PROGRESS NOTES
[employer] SNM Registry Study  IRB# 2014.240  PI: Dr. Donny Calle     Subject # [id number]     Date: 12Apr23    Called patient to complete remote visit- could not reach left VM

## 2023-05-05 ENCOUNTER — TELEPHONE (OUTPATIENT)
Dept: UROLOGY | Facility: CLINIC | Age: 43
End: 2023-05-05
Payer: COMMERCIAL

## 2023-05-05 NOTE — TELEPHONE ENCOUNTER
----- Message from Cele Church LPN sent at 5/5/2023 10:11 AM CDT -----  Contact: pt    ----- Message -----  From: Demetra Sarabia  Sent: 5/5/2023   9:30 AM CDT  To: Luc [facility] Staff    Pt requesting call back RE: Would like to schedule appt for bladder pain and she states he has to self cathter more often now, nothing available until 6/20 and she would like to get in sooner    Confirmed contact below:  Contact Name:Chelsea Oconnell  Phone Number: [phone]

## 2023-06-29 ENCOUNTER — TELEPHONE (OUTPATIENT)
Dept: UROLOGY | Facility: CLINIC | Age: 43
End: 2023-06-29
Payer: COMMERCIAL

## 2023-06-29 NOTE — TELEPHONE ENCOUNTER
----- Message from Liss Murcia sent at 6/29/2023  2:53 PM CDT -----  Contact: Patient  Type:  Patient Returning Call    Who Called:Chelsea Oconnell  Who Left Message for Patient:Loretta  Does the patient know what this is regarding?:appointment   Would the patient rather a call back or a response via [facility]? Call back   Best Call Back Number:[phone]  Additional Information:

## 2023-06-29 NOTE — TELEPHONE ENCOUNTER
----- Message from Lisbeth Jack sent at 6/29/2023 10:34 AM CDT -----  Regarding: Cancel and Reschedule  Contact: patient  Per phone call with patient she would like to cancel the appointment on 08/01/2023 and reschedule the appointment.  Please return call at [phone] (home).    Thanks,  SJ

## 2023-07-25 ENCOUNTER — OFFICE VISIT (OUTPATIENT)
Dept: UROLOGY | Facility: CLINIC | Age: 43
End: 2023-07-25
Payer: COMMERCIAL

## 2023-07-25 VITALS
WEIGHT: 153 LBS | SYSTOLIC BLOOD PRESSURE: 120 MMHG | HEIGHT: 62 IN | DIASTOLIC BLOOD PRESSURE: 78 MMHG | BODY MASS INDEX: 28.16 KG/M2 | RESPIRATION RATE: 18 BRPM | HEART RATE: 89 BPM

## 2023-07-25 DIAGNOSIS — R33.9 INCOMPLETE EMPTYING OF BLADDER: Primary | ICD-10-CM

## 2023-07-25 LAB — POC RESIDUAL URINE VOLUME: 81 ML (ref 0–100)

## 2023-07-25 PROCEDURE — 99214 PR OFFICE/OUTPT VISIT, EST, LEVL IV, 30-39 MIN: ICD-10-PCS | Mod: S$GLB,,, | Performed by: UROLOGY

## 2023-07-25 PROCEDURE — 99214 OFFICE O/P EST MOD 30 MIN: CPT | Mod: S$GLB,,, | Performed by: UROLOGY

## 2023-07-25 PROCEDURE — 51798 POCT BLADDER SCAN: ICD-10-PCS | Mod: S$GLB,,, | Performed by: UROLOGY

## 2023-07-25 PROCEDURE — 3008F PR BODY MASS INDEX (BMI) DOCUMENTED: ICD-10-PCS | Mod: CPTII,S$GLB,, | Performed by: UROLOGY

## 2023-07-25 PROCEDURE — 1159F PR MEDICATION LIST DOCUMENTED IN MEDICAL RECORD: ICD-10-PCS | Mod: CPTII,S$GLB,, | Performed by: UROLOGY

## 2023-07-25 PROCEDURE — 3008F BODY MASS INDEX DOCD: CPT | Mod: CPTII,S$GLB,, | Performed by: UROLOGY

## 2023-07-25 PROCEDURE — 1159F MED LIST DOCD IN RCRD: CPT | Mod: CPTII,S$GLB,, | Performed by: UROLOGY

## 2023-07-25 PROCEDURE — 3078F PR MOST RECENT DIASTOLIC BLOOD PRESSURE < 80 MM HG: ICD-10-PCS | Mod: CPTII,S$GLB,, | Performed by: UROLOGY

## 2023-07-25 PROCEDURE — 3074F PR MOST RECENT SYSTOLIC BLOOD PRESSURE < 130 MM HG: ICD-10-PCS | Mod: CPTII,S$GLB,, | Performed by: UROLOGY

## 2023-07-25 PROCEDURE — 3074F SYST BP LT 130 MM HG: CPT | Mod: CPTII,S$GLB,, | Performed by: UROLOGY

## 2023-07-25 PROCEDURE — 3078F DIAST BP <80 MM HG: CPT | Mod: CPTII,S$GLB,, | Performed by: UROLOGY

## 2023-07-25 PROCEDURE — 51798 US URINE CAPACITY MEASURE: CPT | Mod: S$GLB,,, | Performed by: UROLOGY

## 2023-07-25 RX ORDER — CHOLECALCIFEROL (VITAMIN D3) 10 MCG
1 TABLET ORAL
COMMUNITY
Start: 2023-04-21

## 2023-07-25 RX ORDER — PREGABALIN 50 MG/1
CAPSULE ORAL
COMMUNITY
Start: 2023-07-05

## 2023-07-25 RX ORDER — IPRATROPIUM BROMIDE AND ALBUTEROL SULFATE 2.5; .5 MG/3ML; MG/3ML
SOLUTION RESPIRATORY (INHALATION)
COMMUNITY
Start: 2023-04-21

## 2023-07-25 RX ORDER — NALOXONE HYDROCHLORIDE 4 MG/.1ML
SPRAY NASAL
COMMUNITY
Start: 2023-07-05

## 2023-07-25 RX ORDER — ESTRADIOL 0.1 MG/D
FILM, EXTENDED RELEASE TRANSDERMAL
COMMUNITY
Start: 2023-07-05

## 2023-07-25 RX ORDER — OMEGA-3/DHA/EPA/FISH OIL 1000 MG
1 CAPSULE ORAL
COMMUNITY
Start: 2023-04-21

## 2023-07-25 RX ORDER — ALBUTEROL SULFATE 90 UG/1
AEROSOL, METERED RESPIRATORY (INHALATION)
COMMUNITY
Start: 2023-04-21

## 2023-07-25 RX ORDER — ROSUVASTATIN CALCIUM 20 MG/1
20 TABLET, COATED ORAL NIGHTLY
COMMUNITY
Start: 2023-04-27

## 2023-07-25 NOTE — PROGRESS NOTES
Subjective:       Patient ID: Cehlsea Oconnell is a 42 y.o. female.    Chief Complaint: interstitial cystitis (Sees a physician in [facility] but tired of the traveling, has Left interstimm, here today to discuss SP tube. Self caths up to 3 x day ) and Urinary Tract Infection (Currently being treated for UTI with abx by another physician )      HPI: 42-year-old female patient presenting to clinic to establish care for incomplete emptying of the bladder.  She wants to discuss getting an SP tube placed.  The patient states that she would a liver resection and began experiencing urinary issues following that.  She is been seeing a urologist in [place] but would like to establish care here.  The patient has had 2 InterStim devices and currently self catheterizes 3 times a day.  She states that she is unable to urinate on her own and when she catheterizes she gets approximately 100-150 cc each time.  She denies the urge to urinate and states that she catheterizes when she feels lower abdominal pressure.       Past Medical History:   Past Medical History:   Diagnosis Date    Crohn's colitis     High cholesterol     Interstitial cystitis     Lupus (systemic lupus erythematosus)     Urinary tract infection        Past Surgical Historical:   Past Surgical History:   Procedure Laterality Date    bladder fistula removed       SECTION      CHOLECYSTECTOMY      CYSTOSCOPY WITH HYDRODISTENSION AND BIOPSY OF BLADDER N/A 2019    Procedure: CYSTOSCOPY, WITH BLADDER HYDRODISTENSION AND BIOPSY;  Surgeon: Donny Calle MD;  Location: [address];  Service: Urology;  Laterality: N/A;  1 hour    DILATION OF URETHRA N/A 2019    Procedure: DILATION, URETHRA;  Surgeon: Donny Calle MD;  Location: [address];  Service: Urology;  Laterality: N/A;    FLUOROSCOPIC URODYNAMIC STUDY N/A 2019    Procedure: URODYNAMIC STUDY, FLUOROSCOPIC;  Surgeon: Donny Calle MD;  Location: [facility] OR [address] [place] [address];   Service: Urology;  Laterality: N/A;  1 hour    FLUOROSCOPY N/A 07/31/2019    Procedure: FLUOROSCOPY;  Surgeon: Donny Calle MD;  Location: [facility] OR 2ND FLR;  Service: Urology;  Laterality: N/A;    HEMORRHOID SURGERY      X2    HYSTERECTOMY      INSERTION OF SACRAL NEUROSTIMULATOR GENERATOR Left 08/14/2019    Procedure: INSERTION, PULSE GENERATOR, NEUROSTIMULATOR, SACRAL Stage2;  Surgeon: Donny Calle MD;  Location: [facility] OR 2ND FLR;  Service: Urology;  Laterality: Left;    INSERTION OF SACRAL NEUROSTIMULATOR, ELECTRODES, AND IMPLANTABLE PULSE GENERATOR (IPG) Right 07/31/2019    Procedure: INSERTION, ELECTRODE LEAD, NEUROSTIMULATOR, SACRAL;  Surgeon: Donny Calle MD;  Location: [facility] OR 2ND FLR;  Service: Urology;  Laterality: Right;    INSTILLATION OF URINARY BLADDER N/A 06/05/2019    Procedure: INSTILLATION, BLADDER;  Surgeon: Donny Calle MD;  Location: [facility] OR 1ST FLR;  Service: Urology;  Laterality: N/A;    liver      hemangioma removed    PERCUTANEOUS INSERTION OF SACRAL NERVE NEUROSTIMULATOR ELECTRODE LEAD Left 07/13/2022    Procedure: INSERTION, ELECTRODE LEAD, NEUROSTIMULATOR, SACRAL, PERCUTANEOUS INTERSTIM X;  Surgeon: Donny Calle MD;  Location: [facility] OR 2ND FLR;  Service: Urology;  Laterality: Left;  96423 code  09406 code  90508 code    REMOVAL OF ELECTRODE LEAD OF SACRAL NERVE STIMULATOR N/A 07/26/2022    Procedure: REMOVAL, ELECTRODE LEAD, SACRAL NERVE STIMULATOR AND INTERSTIM GENERATOR WOUNDED IRRIGATION;  Surgeon: Donny Calle MD;  [facility];  Service: Urology;  Laterality: N/A;  45MIN    REVISION OF PROCEDURE INVOLVING SACRAL NEUROSTIMULATOR DEVICE Right 07/13/2022    Procedure: REVISION, NEUROSTIMULATOR, SACRAL;  Surgeon: Donny Calle MD;  Location: [facility] OR 2ND FLR;  Service: Urology;  Laterality: Right;  1.35  new wire  3058 old battery  29761 code  89820 code  86345 code    SURGICAL REMOVAL OF ENDOMETRIOSIS  2013 2013,2015,2016,2018        Medications:   Medication  "List with Changes/Refills   Current Medications    ALBUTEROL (PROVENTIL/VENTOLIN HFA) 90 MCG/ACTUATION INHALER    SMARTSI Puff(s) Via Inhaler 4 Times Daily PRN    ALBUTEROL-IPRATROPIUM (DUO-NEB) 2.5 MG-0.5 MG/3 ML NEBULIZER SOLUTION    Take by nebulization.    AMITRIPTYLINE (ELAVIL) 25 MG TABLET    Take 1 tablet (25 mg total) by mouth nightly as needed for Insomnia. One by mouth every evening at bedtime to help with bladder pain and sleeping    ESTRADIOL (VIVELLE-DOT) 0.1 MG/24 HR PTSW        FISH OIL 1,000 MG (120 MG-180 MG) CAP    Take 1 capsule by mouth.    GABAPENTIN (NEURONTIN) 300 MG CAPSULE    Take 1 capsule (300 mg total) by mouth 3 (three) times daily.    HYDROXYCHLOROQUINE (PLAQUENIL) 200 MG TABLET    Take 200 mg by mouth once daily. TAKES MID DAY    MULTIVITAMIN-CA-IRON-MINERALS 27-0.4 MG TAB    Take by mouth once daily.    NALOXONE (NARCAN) 4 MG/ACTUATION SPRY        OXYCODONE (ROXICODONE) 5 MG IMMEDIATE RELEASE TABLET    Take 1 tablet (5 mg total) by mouth every 6 (six) hours as needed for Pain.    PHENAZOPYRIDINE (PYRIDIUM) 100 MG TABLET    Take 2 tablets (200 mg total) by mouth 3 (three) times daily as needed for Pain (bladder pain).    PREGABALIN (LYRICA) 50 MG CAPSULE        ROSUVASTATIN (CRESTOR) 20 MG TABLET    Take 20 mg by mouth every evening.    TAMSULOSIN (FLOMAX) 0.4 MG CAP    Take 1 capsule (0.4 mg total) by mouth 2 (two) times a day.    URINARY BAG-CATHETER (VAPRO PLUS INTERMITT CATHETER) 12 FR- 8" CMPK    1 Units by Misc.(Non-Drug; Combo Route) route 6 (six) times daily.    VENLAFAXINE (EFFEXOR-XR) 75 MG 24 HR CAPSULE    Take by mouth every morning.    VITAMIN D3 10 MCG (400 UNIT) TABLET    Take 1 tablet by mouth.    VITAMIN E 100 UNIT CAPSULE    Take 100 Units by mouth.   Discontinued Medications    DOXAZOSIN (CARDURA) 1 MG TABLET    Take 1 tablet (1 mg total) by mouth every evening.    ESTRADIOL TRANSDERMAL PATCH TD    Place onto the skin.    LACTOBACILLUS RHAMNOSUS GG (CULTURELLE) 10 " "BILLION CELL CAPSULE    Take 1 capsule by mouth once daily.    LORAZEPAM (ATIVAN) 1 MG TABLET    Take 1 tablet (1 mg total) by mouth nightly as needed for Anxiety. Take 1 tablet by mouth 30 mins before the procedure    OMEGA-3 ACID ETHYL ESTERS (LOVAZA) 1 GRAM CAPSULE    Take 1 g by mouth once daily.    TRAMADOL (ULTRAM) 50 MG TABLET    Take 1 tablet (50 mg total) by mouth every 4 (four) hours as needed for Pain.        Past Social History:   Social History     Socioeconomic History    Marital status:    Tobacco Use    Smoking status: Never    Smokeless tobacco: Never   Substance and Sexual Activity    Alcohol use: Never    Drug use: Yes     Types: Oxycodone, Hydrocodone    Sexual activity: Yes     Partners: Male       Allergies:   Review of patient's allergies indicates:   Allergen Reactions    Iodine and iodide containing products Rash and Swelling    Shellfish containing products Rash and Swelling    Sulfa (sulfonamide antibiotics) Rash and Swelling    Adhesive Hives     Had reaction to surgical tape after last procedure    Sertraline Hives    Xanax [alprazolam] Hives    Vancomycin analogues Itching     Pt had itching, redness to neck/face, and "feeling hot"         Family History:   Family History   Problem Relation Age of Onset    No Known Problems Father     Heart disease Mother         Review of Systems:  Review of Systems   Constitutional:  Negative for activity change, appetite change, chills, diaphoresis, fatigue, fever and unexpected weight change.   HENT:  Negative for congestion, dental problem, drooling, ear discharge, ear pain, facial swelling, hearing loss, mouth sores, nosebleeds, postnasal drip, rhinorrhea, sinus pressure, sinus pain, sneezing, sore throat, tinnitus, trouble swallowing and voice change.    Eyes:  Negative for photophobia, pain, discharge, redness, itching and visual disturbance.   Respiratory:  Negative for apnea, cough, choking, chest tightness, shortness of breath, " wheezing and stridor.    Cardiovascular:  Negative for chest pain and leg swelling.   Gastrointestinal:  Negative for abdominal distention, abdominal pain, anal bleeding, blood in stool, constipation, diarrhea, nausea, rectal pain and vomiting.   Endocrine: Negative for cold intolerance, heat intolerance, polydipsia, polyphagia and polyuria.   Genitourinary:  Positive for difficulty urinating. Negative for decreased urine volume, dyspareunia, dysuria, enuresis, flank pain, frequency, genital sores, hematuria, menstrual problem, pelvic pain, urgency, vaginal bleeding, vaginal discharge and vaginal pain.   Musculoskeletal:  Negative for arthralgias, back pain, gait problem, joint swelling, myalgias, neck pain and neck stiffness.   Skin:  Negative for color change, pallor, rash and wound.   Allergic/Immunologic: Negative for environmental allergies, food allergies and immunocompromised state.   Neurological:  Negative for dizziness, tremors, seizures, syncope, facial asymmetry, speech difficulty, weakness, light-headedness, numbness and headaches.   Hematological:  Negative for adenopathy. Does not bruise/bleed easily.   Psychiatric/Behavioral:  Negative for agitation, behavioral problems, confusion, decreased concentration, dysphoric mood, hallucinations, self-injury, sleep disturbance and suicidal ideas. The patient is not nervous/anxious and is not hyperactive.      Physical Exam:  Physical Exam  Exam conducted with a chaperone present.   Constitutional:       Appearance: Normal appearance.   HENT:      Head: Normocephalic.      Nose: Nose normal.      Mouth/Throat:      Mouth: Mucous membranes are moist.      Pharynx: Oropharynx is clear.   Eyes:      Extraocular Movements: Extraocular movements intact.      Conjunctiva/sclera: Conjunctivae normal.      Pupils: Pupils are equal, round, and reactive to light.   Cardiovascular:      Rate and Rhythm: Normal rate and regular rhythm.      Pulses: Normal pulses.       Heart sounds: Normal heart sounds.   Pulmonary:      Effort: Pulmonary effort is normal.      Breath sounds: Normal breath sounds.   Abdominal:      General: Abdomen is flat. Bowel sounds are normal.      Palpations: Abdomen is soft.      Hernia: There is no hernia in the left inguinal area or right inguinal area.   Genitourinary:     General: Normal vulva.      Pubic Area: No rash or pubic lice.       Labia:         Right: No rash, tenderness, lesion or injury.         Left: No rash, tenderness, lesion or injury.       Urethra: No prolapse, urethral pain, urethral swelling or urethral lesion.      Rectum: Normal.   Musculoskeletal:         General: Normal range of motion.      Cervical back: Normal range of motion and neck supple.   Lymphadenopathy:      Lower Body: No right inguinal adenopathy. No left inguinal adenopathy.   Skin:     General: Skin is warm and dry.      Capillary Refill: Capillary refill takes less than 2 seconds.   Neurological:      General: No focal deficit present.      Mental Status: She is alert and oriented to person, place, and time. Mental status is at baseline.   Psychiatric:         Mood and Affect: Mood normal.         Behavior: Behavior normal.         Thought Content: Thought content normal.         Judgment: Judgment normal.       Assessment/Plan:     Overactive bladder/interstitial cystitis:  Patient presenting with a complaint of urinary retention however she is only retaining 81 cc of urine at this time.  She is self cathing 3 times a day and only able to get 100 cc of urine out each time.  Patient has a history of interstitial cystitis and was told by Dr. Forbes that she needed cystectomy.  Instructed patient to run trial of discontinuing self catheterization and take Gemtesa once daily for a week.  We will set the patient up for cystoscopy for further evaluation.    I, Dr. Lukas Reyes have seen and personally evaluated the patient. I have formulated the plan reviewed all  pertinent imaging and clinical data.  I agree with the nurse practitioner's assessment, and I have personally formulated the plan for this patient's care as described by the midlevel.      Problem List Items Addressed This Visit    None  Visit Diagnoses       Incomplete emptying of bladder    -  Primary    Relevant Orders    POCT Bladder Scan (Completed)    Cystoscopy

## 2023-08-07 ENCOUNTER — LAB VISIT (OUTPATIENT)
Dept: LAB | Facility: HOSPITAL | Age: 43
End: 2023-08-07
Attending: UROLOGY
Payer: COMMERCIAL

## 2023-08-07 ENCOUNTER — OFFICE VISIT (OUTPATIENT)
Dept: UROLOGY | Facility: CLINIC | Age: 43
End: 2023-08-07
Payer: COMMERCIAL

## 2023-08-07 VITALS
BODY MASS INDEX: 32.22 KG/M2 | DIASTOLIC BLOOD PRESSURE: 79 MMHG | WEIGHT: 175.06 LBS | SYSTOLIC BLOOD PRESSURE: 114 MMHG | HEART RATE: 96 BPM | HEIGHT: 62 IN

## 2023-08-07 DIAGNOSIS — R60.9 FLUID RETENTION: ICD-10-CM

## 2023-08-07 DIAGNOSIS — R10.2 PELVIC PAIN: ICD-10-CM

## 2023-08-07 DIAGNOSIS — N39.0 RECURRENT UTI: ICD-10-CM

## 2023-08-07 DIAGNOSIS — N32.81 OAB (OVERACTIVE BLADDER): ICD-10-CM

## 2023-08-07 DIAGNOSIS — R33.9 INCOMPLETE BLADDER EMPTYING: ICD-10-CM

## 2023-08-07 DIAGNOSIS — R60.9 BODY FLUID RETENTION: ICD-10-CM

## 2023-08-07 DIAGNOSIS — N30.10 IC (INTERSTITIAL CYSTITIS): Primary | ICD-10-CM

## 2023-08-07 DIAGNOSIS — M32.9 LUPUS: ICD-10-CM

## 2023-08-07 LAB
ALBUMIN SERPL BCP-MCNC: 3.8 G/DL (ref 3.5–5.2)
ALP SERPL-CCNC: 108 U/L (ref 55–135)
ALT SERPL W/O P-5'-P-CCNC: 46 U/L (ref 10–44)
ANION GAP SERPL CALC-SCNC: 11 MMOL/L (ref 8–16)
AST SERPL-CCNC: 32 U/L (ref 10–40)
BILIRUB SERPL-MCNC: 0.5 MG/DL (ref 0.1–1)
BUN SERPL-MCNC: 9 MG/DL (ref 6–20)
CALCIUM SERPL-MCNC: 9.2 MG/DL (ref 8.7–10.5)
CHLORIDE SERPL-SCNC: 102 MMOL/L (ref 95–110)
CO2 SERPL-SCNC: 25 MMOL/L (ref 23–29)
CREAT SERPL-MCNC: 0.8 MG/DL (ref 0.5–1.4)
EST. GFR  (NO RACE VARIABLE): >60 ML/MIN/1.73 M^2
GLUCOSE SERPL-MCNC: 105 MG/DL (ref 70–110)
POTASSIUM SERPL-SCNC: 3.6 MMOL/L (ref 3.5–5.1)
PROT SERPL-MCNC: 7.6 G/DL (ref 6–8.4)
SODIUM SERPL-SCNC: 138 MMOL/L (ref 136–145)

## 2023-08-07 PROCEDURE — 1159F PR MEDICATION LIST DOCUMENTED IN MEDICAL RECORD: ICD-10-PCS | Mod: CPTII,S$GLB,, | Performed by: UROLOGY

## 2023-08-07 PROCEDURE — 3074F PR MOST RECENT SYSTOLIC BLOOD PRESSURE < 130 MM HG: ICD-10-PCS | Mod: CPTII,S$GLB,, | Performed by: UROLOGY

## 2023-08-07 PROCEDURE — 3078F PR MOST RECENT DIASTOLIC BLOOD PRESSURE < 80 MM HG: ICD-10-PCS | Mod: CPTII,S$GLB,, | Performed by: UROLOGY

## 2023-08-07 PROCEDURE — 36415 COLL VENOUS BLD VENIPUNCTURE: CPT | Performed by: UROLOGY

## 2023-08-07 PROCEDURE — 87086 URINE CULTURE/COLONY COUNT: CPT | Performed by: UROLOGY

## 2023-08-07 PROCEDURE — 99215 OFFICE O/P EST HI 40 MIN: CPT | Mod: S$GLB,,, | Performed by: UROLOGY

## 2023-08-07 PROCEDURE — 3078F DIAST BP <80 MM HG: CPT | Mod: CPTII,S$GLB,, | Performed by: UROLOGY

## 2023-08-07 PROCEDURE — 1159F MED LIST DOCD IN RCRD: CPT | Mod: CPTII,S$GLB,, | Performed by: UROLOGY

## 2023-08-07 PROCEDURE — 99999 PR PBB SHADOW E&M-EST. PATIENT-LVL V: ICD-10-PCS | Mod: PBBFAC,,, | Performed by: UROLOGY

## 2023-08-07 PROCEDURE — 99999 PR PBB SHADOW E&M-EST. PATIENT-LVL V: CPT | Mod: PBBFAC,,, | Performed by: UROLOGY

## 2023-08-07 PROCEDURE — 80053 COMPREHEN METABOLIC PANEL: CPT | Performed by: UROLOGY

## 2023-08-07 PROCEDURE — 3008F BODY MASS INDEX DOCD: CPT | Mod: CPTII,S$GLB,, | Performed by: UROLOGY

## 2023-08-07 PROCEDURE — 99215 PR OFFICE/OUTPT VISIT, EST, LEVL V, 40-54 MIN: ICD-10-PCS | Mod: S$GLB,,, | Performed by: UROLOGY

## 2023-08-07 PROCEDURE — 3074F SYST BP LT 130 MM HG: CPT | Mod: CPTII,S$GLB,, | Performed by: UROLOGY

## 2023-08-07 PROCEDURE — 3008F PR BODY MASS INDEX (BMI) DOCUMENTED: ICD-10-PCS | Mod: CPTII,S$GLB,, | Performed by: UROLOGY

## 2023-08-07 RX ORDER — TAMSULOSIN HYDROCHLORIDE 0.4 MG/1
0.4 CAPSULE ORAL DAILY
Qty: 90 CAPSULE | Refills: 3 | Status: SHIPPED | OUTPATIENT
Start: 2023-08-07 | End: 2024-08-06

## 2023-08-07 RX ORDER — ESTRADIOL 0.1 MG/G
1 CREAM VAGINAL EVERY OTHER DAY
Qty: 42.5 G | Refills: 3 | Status: SHIPPED | OUTPATIENT
Start: 2023-08-07 | End: 2023-09-06

## 2023-08-07 RX ORDER — HYDROXYZINE HYDROCHLORIDE 25 MG/1
25 TABLET, FILM COATED ORAL 3 TIMES DAILY
Qty: 90 TABLET | Refills: 11 | Status: SHIPPED | OUTPATIENT
Start: 2023-08-07 | End: 2023-12-18

## 2023-08-07 NOTE — PROGRESS NOTES
Subjective:       Patient ID: Chelsea Oconnell is a 42 y.o. female.    Chief Complaint: Urinary Tract Infection (Pt states she is here for recurrent UTI's and IC.)      HPI: 42-year-old female patient presenting to clinic to establish care for incomplete emptying of the bladder.  She wants to discuss getting an SP tube placed.  The patient states that she would a liver resection and began experiencing urinary issues following that.  She is been seeing a urologist in [place] but would like to establish care here.  The patient has had 2 InterStim devices and currently self catheterizes 3 times a day.  She states that she is unable to urinate on her own and when she catheterizes she gets approximately 100-150 cc each time.  She denies the urge to urinate and states that she catheterizes when she feels lower abdominal pressure.    Chelsea Oconnell is a 41 y.o. female who recently underwent placement of bilateral InterStim leads and IPG's. Now, there is concern for infection involving her right-sided IPG and lead. She is presenting for surgical intervention.        Date: 07/26/2022  Pre-Op Diagnosis: Infection involving right-sided InterStim IPG and lead   Procedure(s) Performed:   1.  Removal without replacement of neurostimulator electrode array (right side) (34281)  2.  Removal without replacement of implantable pulse generator (right side) (17744)  Specimen(s): Wound culture  Staff Surgeon: Donny Calle MD  Findings:    1. Right-sided IPG incision with complete superficial skin separation, fibrinous exudate within wound, and drainage of serous fluid. No surrounding cellulitis.  2. No purulence encountered within IPG pocket.  3. Right-sided IPG and lead removed without complication.           Past Medical History:   Past Medical History:   Diagnosis Date    Crohn's colitis     High cholesterol     Interstitial cystitis     Lupus (systemic lupus erythematosus)     Urinary tract infection        Past  Surgical Historical:   Past Surgical History:   Procedure Laterality Date    bladder fistula removed       SECTION      CHOLECYSTECTOMY      CYSTOSCOPY WITH HYDRODISTENSION AND BIOPSY OF BLADDER N/A 2019    Procedure: CYSTOSCOPY, WITH BLADDER HYDRODISTENSION AND BIOPSY;  Surgeon: Donny Calle MD;  [facility];  Service: Urology;  Laterality: N/A;  1 hour    DILATION OF URETHRA N/A 2019    Procedure: DILATION, URETHRA;  Surgeon: Donny Calle MD;  [facility];  Service: Urology;  Laterality: N/A;    FLUOROSCOPIC URODYNAMIC STUDY N/A 2019    Procedure: URODYNAMIC STUDY, FLUOROSCOPIC;  Surgeon: Donny Calle MD;  [facility];  Service: Urology;  Laterality: N/A;  1 hour    FLUOROSCOPY N/A 2019    Procedure: FLUOROSCOPY;  Surgeon: Donny Calle MD;  [facility];  Service: Urology;  Laterality: N/A;    HEMORRHOID SURGERY      X2    HYSTERECTOMY      INSERTION OF SACRAL NEUROSTIMULATOR GENERATOR Left 2019    Procedure: INSERTION, PULSE GENERATOR, NEUROSTIMULATOR, SACRAL Stage2;  Surgeon: Donny Calle MD;  [facility];  Service: Urology;  Laterality: Left;    INSERTION OF SACRAL NEUROSTIMULATOR, ELECTRODES, AND IMPLANTABLE PULSE GENERATOR (IPG) Right 2019    Procedure: INSERTION, ELECTRODE LEAD, NEUROSTIMULATOR, SACRAL;  Surgeon: Donny Calle MD;  [facility];  Service: Urology;  Laterality: Right;    INSTILLATION OF URINARY BLADDER N/A 2019    Procedure: INSTILLATION, BLADDER;  Surgeon: Donny Calle MD;  [facility];  Service: Urology;  Laterality: N/A;    liver      hemangioma removed    PERCUTANEOUS INSERTION OF SACRAL NERVE NEUROSTIMULATOR ELECTRODE LEAD Left 2022    Procedure: INSERTION, ELECTRODE LEAD, NEUROSTIMULATOR, SACRAL, PERCUTANEOUS INTERSTIM X;  Surgeon: Donny Calle MD;  [facility];  Service: Urology;  Laterality: Left;   62354 code  00293 code  99744 code    REMOVAL OF ELECTRODE LEAD OF SACRAL NERVE STIMULATOR N/A 2022    Procedure: REMOVAL, ELECTRODE LEAD, SACRAL NERVE STIMULATOR AND INTERSTIM GENERATOR WOUNDED IRRIGATION;  Surgeon: Donny Calle MD;  Location: [facility] OR 2ND FLR;  Service: Urology;  Laterality: N/A;  45MIN    REVISION OF PROCEDURE INVOLVING SACRAL NEUROSTIMULATOR DEVICE Right 2022    Procedure: REVISION, NEUROSTIMULATOR, SACRAL;  Surgeon: Donny Calle MD;  Location: [facility] OR 2ND FLR;  Service: Urology;  Laterality: Right;  1.35  new wire  3058 old battery  39220 code  00129 code  01077 code    SURGICAL REMOVAL OF ENDOMETRIOSIS  2013,,,        Medications:   Medication List with Changes/Refills   New Medications    CATHETER 10 FR MISC    1 Units by Misc.(Non-Drug; Combo Route) route 3 (three) times daily.    ESTRADIOL (ESTRACE) 0.01 % (0.1 MG/GRAM) VAGINAL CREAM    Place 1 g vaginally every other day.    HYDROXYZINE HCL (ATARAX) 25 MG TABLET    Take 1 tablet (25 mg total) by mouth 3 (three) times daily.    PENTOSAN POLYSULFATE (ELMIRON) 100 MG CAP    Take 1 capsule (100 mg total) by mouth 3 (three) times daily.   Current Medications    ALBUTEROL (PROVENTIL/VENTOLIN HFA) 90 MCG/ACTUATION INHALER    SMARTSI Puff(s) Via Inhaler 4 Times Daily PRN    ALBUTEROL-IPRATROPIUM (DUO-NEB) 2.5 MG-0.5 MG/3 ML NEBULIZER SOLUTION    Take by nebulization.    ESTRADIOL (VIVELLE-DOT) 0.1 MG/24 HR PTSW        FISH OIL 1,000 MG (120 MG-180 MG) CAP    Take 1 capsule by mouth.    GABAPENTIN (NEURONTIN) 300 MG CAPSULE    Take 1 capsule (300 mg total) by mouth 3 (three) times daily.    HYDROXYCHLOROQUINE (PLAQUENIL) 200 MG TABLET    Take 200 mg by mouth once daily. TAKES MID DAY    MULTIVITAMIN-CA-IRON-MINERALS 27-0.4 MG TAB    Take by mouth once daily.    NALOXONE (NARCAN) 4 MG/ACTUATION SPRY        OXYCODONE (ROXICODONE) 5 MG IMMEDIATE RELEASE TABLET    Take 1 tablet (5 mg total) by mouth every 6 (six)  "hours as needed for Pain.    PHENAZOPYRIDINE (PYRIDIUM) 100 MG TABLET    Take 2 tablets (200 mg total) by mouth 3 (three) times daily as needed for Pain (bladder pain).    PREGABALIN (LYRICA) 50 MG CAPSULE        ROSUVASTATIN (CRESTOR) 20 MG TABLET    Take 20 mg by mouth every evening.    URINARY BAG-CATHETER (VAPRO PLUS INTERMITT CATHETER) 12 FR- 8" CMPK    1 Units by Misc.(Non-Drug; Combo Route) route 6 (six) times daily.    VENLAFAXINE (EFFEXOR-XR) 75 MG 24 HR CAPSULE    Take by mouth every morning.    VITAMIN D3 10 MCG (400 UNIT) TABLET    Take 1 tablet by mouth.    VITAMIN E 100 UNIT CAPSULE    Take 100 Units by mouth.   Changed and/or Refilled Medications    Modified Medication Previous Medication    TAMSULOSIN (FLOMAX) 0.4 MG CAP tamsulosin (FLOMAX) 0.4 mg Cap       Take 1 capsule (0.4 mg total) by mouth Daily.    Take 1 capsule (0.4 mg total) by mouth 2 (two) times a day.   Discontinued Medications    AMITRIPTYLINE (ELAVIL) 25 MG TABLET    Take 1 tablet (25 mg total) by mouth nightly as needed for Insomnia. One by mouth every evening at bedtime to help with bladder pain and sleeping        Past Social History:   Social History     Socioeconomic History    Marital status:    Tobacco Use    Smoking status: Never    Smokeless tobacco: Never   Substance and Sexual Activity    Alcohol use: Never    Drug use: Yes     Types: Oxycodone, Hydrocodone    Sexual activity: Yes     Partners: Male       Allergies:   Review of patient's allergies indicates:   Allergen Reactions    Iodine and iodide containing products Rash and Swelling    Shellfish containing products Rash and Swelling    Sulfa (sulfonamide antibiotics) Rash and Swelling    Adhesive Hives     Had reaction to surgical tape after last procedure    Sertraline Hives    Xanax [alprazolam] Hives    Vancomycin analogues Itching     Pt had itching, redness to neck/face, and "feeling hot"         Family History:   Family History   Problem Relation Age of " Onset    No Known Problems Father     Heart disease Mother         Review of Systems:  Review of Systems   Constitutional:  Negative for activity change, appetite change, chills, diaphoresis, fatigue, fever and unexpected weight change.   HENT:  Negative for congestion, dental problem, drooling, ear discharge, ear pain, facial swelling, hearing loss, mouth sores, nosebleeds, postnasal drip, rhinorrhea, sinus pressure, sinus pain, sneezing, sore throat, tinnitus, trouble swallowing and voice change.    Eyes:  Negative for photophobia, pain, discharge, redness, itching and visual disturbance.   Respiratory:  Negative for apnea, cough, choking, chest tightness, shortness of breath, wheezing and stridor.    Cardiovascular:  Negative for chest pain and leg swelling.   Gastrointestinal:  Negative for abdominal distention, abdominal pain, anal bleeding, blood in stool, constipation, diarrhea, nausea, rectal pain and vomiting.   Endocrine: Negative for cold intolerance, heat intolerance, polydipsia, polyphagia and polyuria.   Genitourinary:  Positive for difficulty urinating. Negative for decreased urine volume, dyspareunia, dysuria, enuresis, flank pain, frequency, genital sores, hematuria, menstrual problem, pelvic pain, urgency, vaginal bleeding, vaginal discharge and vaginal pain.   Musculoskeletal:  Negative for arthralgias, back pain, gait problem, joint swelling, myalgias, neck pain and neck stiffness.   Skin:  Negative for color change, pallor, rash and wound.   Allergic/Immunologic: Negative for environmental allergies, food allergies and immunocompromised state.   Neurological:  Negative for dizziness, tremors, seizures, syncope, facial asymmetry, speech difficulty, weakness, light-headedness, numbness and headaches.   Hematological:  Negative for adenopathy. Does not bruise/bleed easily.   Psychiatric/Behavioral:  Negative for agitation, behavioral problems, confusion, decreased concentration, dysphoric mood,  hallucinations, self-injury, sleep disturbance and suicidal ideas. The patient is not nervous/anxious and is not hyperactive.        Physical Exam:  Physical Exam  Exam conducted with a chaperone present.   Constitutional:       Appearance: Normal appearance.   HENT:      Head: Normocephalic.      Nose: Nose normal.      Mouth/Throat:      Mouth: Mucous membranes are moist.      Pharynx: Oropharynx is clear.   Eyes:      Extraocular Movements: Extraocular movements intact.      Conjunctiva/sclera: Conjunctivae normal.      Pupils: Pupils are equal, round, and reactive to light.   Cardiovascular:      Rate and Rhythm: Normal rate and regular rhythm.      Pulses: Normal pulses.      Heart sounds: Normal heart sounds.   Pulmonary:      Effort: Pulmonary effort is normal.      Breath sounds: Normal breath sounds.   Abdominal:      General: Abdomen is flat. Bowel sounds are normal.      Palpations: Abdomen is soft.      Hernia: There is no hernia in the left inguinal area or right inguinal area.   Genitourinary:     General: Normal vulva.      Pubic Area: No rash or pubic lice.       Labia:         Right: No rash, tenderness, lesion or injury.         Left: No rash, tenderness, lesion or injury.       Urethra: No prolapse, urethral pain, urethral swelling or urethral lesion.      Rectum: Normal.   Musculoskeletal:         General: Normal range of motion.      Cervical back: Normal range of motion and neck supple.   Lymphadenopathy:      Lower Body: No right inguinal adenopathy. No left inguinal adenopathy.   Skin:     General: Skin is warm and dry.      Capillary Refill: Capillary refill takes less than 2 seconds.   Neurological:      General: No focal deficit present.      Mental Status: She is alert and oriented to person, place, and time. Mental status is at baseline.   Psychiatric:         Mood and Affect: Mood normal.         Behavior: Behavior normal.         Thought Content: Thought content normal.          Judgment: Judgment normal.         Assessment/Plan:       IC (interstitial cystitis)  -     hydrOXYzine HCL (ATARAX) 25 MG tablet; Take 1 tablet (25 mg total) by mouth 3 (three) times daily.  Dispense: 90 tablet; Refill: 11  -     pentosan polysulfate (ELMIRON) 100 mg Cap; Take 1 capsule (100 mg total) by mouth 3 (three) times daily.  Dispense: 90 capsule; Refill: 11    OAB (overactive bladder)  -     tamsulosin (FLOMAX) 0.4 mg Cap; Take 1 capsule (0.4 mg total) by mouth Daily.  Dispense: 90 capsule; Refill: 3  -     estradioL (ESTRACE) 0.01 % (0.1 mg/gram) vaginal cream; Place 1 g vaginally every other day.  Dispense: 42.5 g; Refill: 3    Pelvic pain  -     tamsulosin (FLOMAX) 0.4 mg Cap; Take 1 capsule (0.4 mg total) by mouth Daily.  Dispense: 90 capsule; Refill: 3    Incomplete bladder emptying  -     tamsulosin (FLOMAX) 0.4 mg Cap; Take 1 capsule (0.4 mg total) by mouth Daily.  Dispense: 90 capsule; Refill: 3  -     catheter 10 Fr Misc; 1 Units by Misc.(Non-Drug; Combo Route) route 3 (three) times daily.  Dispense: 100 each; Refill: 00    Recurrent UTI  -     estradioL (ESTRACE) 0.01 % (0.1 mg/gram) vaginal cream; Place 1 g vaginally every other day.  Dispense: 42.5 g; Refill: 3  -     Urine culture    Fluid retention  -     Comprehensive Metabolic Panel; Future; Expected date: 08/07/2023    Lupus  -     Ambulatory referral/consult to Nephrology; Future; Expected date: 08/17/2023    Body fluid retention  -     Ambulatory referral/consult to Nephrology; Future; Expected date: 08/17/2023       Overactive bladder/interstitial cystitis:  Patient presenting with a complaint of urinary retention however she is only retaining 81 cc of urine at this time.  She is self cathing 3 times a day and only able to get 100 cc of urine out each time.  Patient has a history of interstitial cystitis and was told by Dr. Forbes that she needed cystectomy in the past.    I think that she is actually doing well on present care even  though it is not perfect.  Continue IC smart diet, drinking alkaline water and may try Elmiron in addition to hydroxyzine 25 mg q hs.    Continue flomax.  S/p InterStim Therapy.  May consider bilateral InterStim Therapy if desires to see whether it will help her to urinate easier.    To prevent recurrent UTI, Treat chronic constipation.  Increase water and empty the bladder more regularly.  Probiotics (Align) daily  D-Mannose 1 gram twice a day  Cranberry Pills / Juice  Estrace cream applied to the urethra as directed 2 to 3 x a week.    Empty the bladder before and after sexual intercourse.    Regarding fluid retention and possible Lupus affecting kidney function, recommend to follow up with Nephrology.  Pt would like to see:  [address] [place] [id number] [phone]  Dr Stefan Palma  External referral made.    I spent 45 minutes with the patient of which more than half was spent in direct consultation with the patient in regards to our treatment and plan.     Follow up in about 6 months (around 2/7/2024).

## 2023-08-07 NOTE — PATIENT INSTRUCTIONS
Pay attention to urine collection ( clean catch urine) and recommend to get urine culture for any suspected UTI ( even possible cath urine for culture).    Treat chronic constipation.    Increase water and empty the bladder more regularly.  Probiotics (Align) daily  D-Mannose 1 gram twice a day  Cranberry Pills / Juice  Estrace cream applied to the urethra as directed 2 to 3 x a week.    Empty the bladder before and after sexual intercourse.      Nephrology consult

## 2023-08-08 LAB
BACTERIA UR CULT: NORMAL
BACTERIA UR CULT: NORMAL

## 2023-08-09 ENCOUNTER — PATIENT MESSAGE (OUTPATIENT)
Dept: UROLOGY | Facility: CLINIC | Age: 43
End: 2023-08-09
Payer: COMMERCIAL

## 2023-08-10 PROBLEM — M32.9 LUPUS: Status: ACTIVE | Noted: 2023-08-10

## 2023-08-10 PROBLEM — R60.9 BODY FLUID RETENTION: Status: ACTIVE | Noted: 2023-08-10

## 2023-09-07 NOTE — PROGRESS NOTES
"Pt pain 5/10 post pain medication administration. Pt stated she feels "much better and is ready to go home." vss, nad, with family  "
On post op call spoke to patient. She voided this morning around 0800 and has been drinking fluids all day and has not yet been able to void. She has tried without success. Instructed patient to call Dr. Calle's office asap about not being able to void.  
Per urology resident: collect urine for specimen.   
[FreeTextEntry1] : 20 yo otherwise healthy F with no significant PMHx who presents today for breast reduction consultation. Patient wears a 34 DD- DDD and c/o dense heavy chest causing neck, shoulder and back pain, shoulder grooving, poor posture and frequent skin rash in the IMF treated multiple times in the past with OTC medications. Patient has seen a chiropractor and a PT for back pain for 2 years with minimal improvement and has been told to consider breast reduction to alleviate symptoms. Also c/o difficulty with working out secondary to heavy chest.\par  Denies any personal or family h/o breast disease. No prior mammograms. Denies any breast nodules, nipple discharge or inversion. \par  \par  Desires a full B- small C\par  \par  Student - elementary education \par  Nonsmoker \par  \par  Interval hx (7/5/22). Patient presents today POD#8 s/p BBR. Doing well with normal incisional discomfort. Completed all prescribed medications. Denies any f/c or bleeding.   \par  \par  Interval hx (7/12/22): Pt 2 weeks s/p BBR. Pt here with mother. Both visibly upset & tearful c/o NAC asymmetry, L higher than R & pt larger than she expected. Pt also c/o TTP on left lateral chest. Denies f/c, bleeding. 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 \par  \par  Interval hx (7/28/22): Pt 1 month s/p BBR, here for clearance to return to full activity as she starts dance school next week. \par  \par  Interval hx (10/6/22). Pt here 3.5 months s/p BBR. Doing well but concerned with breast and NAC asymmetry R>L. Pt prefers the size and shape of left breast but does not like the superior nipple position. \par  \par  Interval hx (3/20/23). Pt here POD#7 s/p revision of BBR and excision of painful scars. Doing well with no significant pain, f/c or drainage. Completed all prescribed medications.

## 2023-12-18 ENCOUNTER — RESEARCH ENCOUNTER (OUTPATIENT)
Dept: RESEARCH | Facility: HOSPITAL | Age: 43
End: 2023-12-18
Payer: COMMERCIAL

## 2023-12-18 ENCOUNTER — OFFICE VISIT (OUTPATIENT)
Dept: UROLOGY | Facility: CLINIC | Age: 43
End: 2023-12-18
Payer: COMMERCIAL

## 2023-12-18 VITALS
DIASTOLIC BLOOD PRESSURE: 90 MMHG | HEIGHT: 62 IN | HEART RATE: 81 BPM | SYSTOLIC BLOOD PRESSURE: 124 MMHG | WEIGHT: 166.25 LBS | BODY MASS INDEX: 30.59 KG/M2

## 2023-12-18 DIAGNOSIS — M79.7 FIBROMYALGIA MUSCLE PAIN: ICD-10-CM

## 2023-12-18 DIAGNOSIS — R33.9 INCOMPLETE BLADDER EMPTYING: ICD-10-CM

## 2023-12-18 DIAGNOSIS — R39.89 BLADDER PAIN: Primary | ICD-10-CM

## 2023-12-18 DIAGNOSIS — R10.2 PELVIC PAIN IN FEMALE: ICD-10-CM

## 2023-12-18 DIAGNOSIS — N30.10 IC (INTERSTITIAL CYSTITIS): ICD-10-CM

## 2023-12-18 PROCEDURE — 99215 PR OFFICE/OUTPT VISIT, EST, LEVL V, 40-54 MIN: ICD-10-PCS | Mod: 25,S$GLB,, | Performed by: UROLOGY

## 2023-12-18 PROCEDURE — 3080F PR MOST RECENT DIASTOLIC BLOOD PRESSURE >= 90 MM HG: ICD-10-PCS | Mod: CPTII,S$GLB,, | Performed by: UROLOGY

## 2023-12-18 PROCEDURE — 99215 OFFICE O/P EST HI 40 MIN: CPT | Mod: 25,S$GLB,, | Performed by: UROLOGY

## 2023-12-18 PROCEDURE — 3074F PR MOST RECENT SYSTOLIC BLOOD PRESSURE < 130 MM HG: ICD-10-PCS | Mod: CPTII,S$GLB,, | Performed by: UROLOGY

## 2023-12-18 PROCEDURE — 87086 URINE CULTURE/COLONY COUNT: CPT | Performed by: UROLOGY

## 2023-12-18 PROCEDURE — 99999 PR PBB SHADOW E&M-EST. PATIENT-LVL IV: CPT | Mod: PBBFAC,,, | Performed by: UROLOGY

## 2023-12-18 PROCEDURE — 99999 PR PBB SHADOW E&M-EST. PATIENT-LVL IV: ICD-10-PCS | Mod: PBBFAC,,, | Performed by: UROLOGY

## 2023-12-18 PROCEDURE — 3074F SYST BP LT 130 MM HG: CPT | Mod: CPTII,S$GLB,, | Performed by: UROLOGY

## 2023-12-18 PROCEDURE — 51700 PR IRRIGATION, BLADDER: ICD-10-PCS | Mod: S$GLB,,, | Performed by: UROLOGY

## 2023-12-18 PROCEDURE — 3008F PR BODY MASS INDEX (BMI) DOCUMENTED: ICD-10-PCS | Mod: CPTII,S$GLB,, | Performed by: UROLOGY

## 2023-12-18 PROCEDURE — 51700 IRRIGATION OF BLADDER: CPT | Mod: S$GLB,,, | Performed by: UROLOGY

## 2023-12-18 PROCEDURE — 1159F MED LIST DOCD IN RCRD: CPT | Mod: CPTII,S$GLB,, | Performed by: UROLOGY

## 2023-12-18 PROCEDURE — 1159F PR MEDICATION LIST DOCUMENTED IN MEDICAL RECORD: ICD-10-PCS | Mod: CPTII,S$GLB,, | Performed by: UROLOGY

## 2023-12-18 PROCEDURE — 3008F BODY MASS INDEX DOCD: CPT | Mod: CPTII,S$GLB,, | Performed by: UROLOGY

## 2023-12-18 PROCEDURE — 3080F DIAST BP >= 90 MM HG: CPT | Mod: CPTII,S$GLB,, | Performed by: UROLOGY

## 2023-12-18 RX ORDER — BUPIVACAINE HYDROCHLORIDE 5 MG/ML
25 INJECTION, SOLUTION PERINEURAL ONCE
Status: COMPLETED | OUTPATIENT
Start: 2023-12-18 | End: 2023-12-18

## 2023-12-18 RX ORDER — URINARY BAG/CATHETER 14 FR-16 "
1 COMBINATION PACKAGE (EA) MISCELLANEOUS
Qty: 180 EACH | Refills: 99 | Status: SHIPPED | OUTPATIENT
Start: 2023-12-18 | End: 2023-12-18

## 2023-12-18 RX ORDER — SUCRALFATE 1 G/1
TABLET ORAL
COMMUNITY
Start: 2023-10-22

## 2023-12-18 RX ORDER — PANTOPRAZOLE SODIUM 40 MG/1
TABLET, DELAYED RELEASE ORAL
COMMUNITY
Start: 2023-09-28

## 2023-12-18 RX ORDER — PHENAZOPYRIDINE HYDROCHLORIDE 200 MG/1
200 TABLET, FILM COATED ORAL 3 TIMES DAILY PRN
Qty: 45 TABLET | Refills: 3 | Status: SHIPPED | OUTPATIENT
Start: 2023-12-18 | End: 2024-01-02

## 2023-12-18 RX ORDER — POLYETHYLENE GLYCOL 3350, SODIUM CHLORIDE, SODIUM BICARBONATE, POTASSIUM CHLORIDE 420; 11.2; 5.72; 1.48 G/4L; G/4L; G/4L; G/4L
POWDER, FOR SOLUTION ORAL
COMMUNITY
Start: 2023-08-28

## 2023-12-18 RX ORDER — LIDOCAINE HYDROCHLORIDE 10 MG/ML
10 INJECTION INFILTRATION; PERINEURAL
Status: COMPLETED | OUTPATIENT
Start: 2023-12-18 | End: 2023-12-18

## 2023-12-18 RX ORDER — GABAPENTIN 300 MG/1
300 CAPSULE ORAL 3 TIMES DAILY
Qty: 90 CAPSULE | Refills: 11 | Status: SHIPPED | OUTPATIENT
Start: 2023-12-18 | End: 2024-12-17

## 2023-12-18 RX ORDER — INDOMETHACIN 25 MG/1
50 CAPSULE ORAL
Status: COMPLETED | OUTPATIENT
Start: 2023-12-18 | End: 2023-12-18

## 2023-12-18 RX ORDER — MELOXICAM 15 MG/1
TABLET ORAL
COMMUNITY
Start: 2023-11-30

## 2023-12-18 RX ORDER — HYDROXYZINE HYDROCHLORIDE 25 MG/1
25 TABLET, FILM COATED ORAL NIGHTLY
Qty: 90 TABLET | Refills: 3 | Status: SHIPPED | OUTPATIENT
Start: 2023-12-18 | End: 2024-12-17

## 2023-12-18 RX ORDER — HEPARIN SODIUM 10000 [USP'U]/ML
20000 INJECTION, SOLUTION INTRAVENOUS; SUBCUTANEOUS
Status: COMPLETED | OUTPATIENT
Start: 2023-12-18 | End: 2023-12-18

## 2023-12-18 RX ADMIN — HEPARIN SODIUM 20000 UNITS: 10000 INJECTION, SOLUTION INTRAVENOUS; SUBCUTANEOUS at 11:12

## 2023-12-18 RX ADMIN — BUPIVACAINE HYDROCHLORIDE 125 MG: 5 INJECTION, SOLUTION PERINEURAL at 11:12

## 2023-12-18 RX ADMIN — LIDOCAINE HYDROCHLORIDE 10 ML: 10 INJECTION INFILTRATION; PERINEURAL at 11:12

## 2023-12-18 RX ADMIN — INDOMETHACIN 50 MEQ: 25 CAPSULE ORAL at 11:12

## 2023-12-18 NOTE — PATIENT INSTRUCTIONS
Dawson ([place]) Augie, my surgery scheduler will schedule your surgery ([phone]).  The risks and benefits of the procedure were discussed with the patient in detail.    The patient was told to stop all blood thinners prior to surgery.  Stop ASA and ASA products ( all NSADIS) 1 wk before  Stop Plavix 5 days before.    Stop Eliquis, Xarelto 2 days before.  Stop Plavix 5 days before.    Antibiotic soap shower a night before surgery

## 2023-12-18 NOTE — PROGRESS NOTES
Subjective:       Patient ID: Chelsea Oconnell is a 43 y.o. female.    Chief Complaint: IC w/ David (IC w/ David)      HPI: 42-year-old female patient presenting to clinic to establish care for incomplete emptying of the bladder.  She wants to discuss getting an SP tube placed.  The patient states that she would a liver resection and began experiencing urinary issues following that.  She is been seeing a urologist in [place] but would like to establish care here.  The patient has had 2 InterStim devices and currently self catheterizes 3 times a day.  She states that she is unable to urinate on her own and when she catheterizes she gets approximately 100-150 cc each time.  She denies the urge to urinate and states that she catheterizes when she feels lower abdominal pressure.    Chelsea Oconnell is a 41 y.o. female who recently underwent placement of bilateral InterStim leads and IPG's. Now, there is concern for infection involving her right-sided IPG and lead. She is presenting for surgical intervention.        Date: 07/26/2022  Pre-Op Diagnosis: Infection involving right-sided InterStim IPG and lead   Procedure(s) Performed:   1.  Removal without replacement of neurostimulator electrode array (right side) (46439)  2.  Removal without replacement of implantable pulse generator (right side) (58295)  Specimen(s): Wound culture  Staff Surgeon: Donny Calle MD  Findings:    1. Right-sided IPG incision with complete superficial skin separation, fibrinous exudate within wound, and drainage of serous fluid. No surrounding cellulitis.  2. No purulence encountered within IPG pocket.  3. Right-sided IPG and lead removed without complication.           Past Medical History:   Past Medical History:   Diagnosis Date    Crohn's colitis     High cholesterol     Interstitial cystitis     Lupus (systemic lupus erythematosus)     Urinary tract infection        Past Surgical Historical:   Past Surgical History:    Procedure Laterality Date    bladder fistula removed       SECTION      CHOLECYSTECTOMY      CYSTOSCOPY WITH HYDRODISTENSION AND BIOPSY OF BLADDER N/A 2019    Procedure: CYSTOSCOPY, WITH BLADDER HYDRODISTENSION AND BIOPSY;  Surgeon: Donny Calle MD;  [facility];  Service: Urology;  Laterality: N/A;  1 hour    DILATION OF URETHRA N/A 2019    Procedure: DILATION, URETHRA;  Surgeon: Donny Calle MD;  [facility];  Service: Urology;  Laterality: N/A;    FLUOROSCOPIC URODYNAMIC STUDY N/A 2019    Procedure: URODYNAMIC STUDY, FLUOROSCOPIC;  Surgeon: Donny Calle MD;  Location: [facility] OR [address] [place] [address];  Service: Urology;  Laterality: N/A;  1 hour    FLUOROSCOPY N/A 2019    Procedure: FLUOROSCOPY;  Surgeon: Donny Calle MD;  Location: [facility] OR [address] [place] [address];  Service: Urology;  Laterality: N/A;    HEMORRHOID SURGERY      X2    HYSTERECTOMY      INSERTION OF SACRAL NEUROSTIMULATOR GENERATOR Left 2019    Procedure: INSERTION, PULSE GENERATOR, NEUROSTIMULATOR, SACRAL Stage2;  Surgeon: Donny Calle MD;  Location: [facility] OR [address] [place] [address];  Service: Urology;  Laterality: Left;    INSERTION OF SACRAL NEUROSTIMULATOR, ELECTRODES, AND IMPLANTABLE PULSE GENERATOR (IPG) Right 2019    Procedure: INSERTION, ELECTRODE LEAD, NEUROSTIMULATOR, SACRAL;  Surgeon: Donny Calle MD;  Location: [facility] OR [address] [place] [address];  Service: Urology;  Laterality: Right;    INSTILLATION OF URINARY BLADDER N/A 2019    Procedure: INSTILLATION, BLADDER;  Surgeon: Donny Calle MD;  [facility];  Service: Urology;  Laterality: N/A;    liver      hemangioma removed    PERCUTANEOUS INSERTION OF SACRAL NERVE NEUROSTIMULATOR ELECTRODE LEAD Left 2022    Procedure: INSERTION, ELECTRODE LEAD, NEUROSTIMULATOR, SACRAL, PERCUTANEOUS INTERSTIM X;  Surgeon: Donny Calle MD;  Location: [facility] OR [address] [place] [address];  Service: Urology;  Laterality: Left;  88011 code  98261 code  64581 code    REMOVAL  OF ELECTRODE LEAD OF SACRAL NERVE STIMULATOR N/A 2022    Procedure: REMOVAL, ELECTRODE LEAD, SACRAL NERVE STIMULATOR AND INTERSTIM GENERATOR WOUNDED IRRIGATION;  Surgeon: Donny Calle MD;  Location: [facility] OR 2ND FLR;  Service: Urology;  Laterality: N/A;  45MIN    REVISION OF PROCEDURE INVOLVING SACRAL NEUROSTIMULATOR DEVICE Right 2022    Procedure: REVISION, NEUROSTIMULATOR, SACRAL;  Surgeon: Donny Calle MD;  Location: [facility] OR 2ND FLR;  Service: Urology;  Laterality: Right;  1.35  new wire  3058 old battery  97632 code  77336 code  32461 code    SURGICAL REMOVAL OF ENDOMETRIOSIS  2013,,,        Medications:   Medication List with Changes/Refills   New Medications    PHENAZOPYRIDINE (PYRIDIUM) 200 MG TABLET    Take 1 tablet (200 mg total) by mouth 3 (three) times daily as needed for Pain.   Current Medications    ALBUTEROL (PROVENTIL/VENTOLIN HFA) 90 MCG/ACTUATION INHALER    SMARTSI Puff(s) Via Inhaler 4 Times Daily PRN    ALBUTEROL-IPRATROPIUM (DUO-NEB) 2.5 MG-0.5 MG/3 ML NEBULIZER SOLUTION    Take by nebulization.    ESTRADIOL (VIVELLE-DOT) 0.1 MG/24 HR PTSW        FISH OIL 1,000 MG (120 MG-180 MG) CAP    Take 1 capsule by mouth.    HYDROXYCHLOROQUINE (PLAQUENIL) 200 MG TABLET    Take 200 mg by mouth once daily. TAKES MID DAY    MELOXICAM (MOBIC) 15 MG TABLET        MULTIVITAMIN-CA-IRON-MINERALS 27-0.4 MG TAB    Take by mouth once daily.    NALOXONE (NARCAN) 4 MG/ACTUATION SPRY        OXYCODONE (ROXICODONE) 5 MG IMMEDIATE RELEASE TABLET    Take 1 tablet (5 mg total) by mouth every 6 (six) hours as needed for Pain.    PANTOPRAZOLE (PROTONIX) 40 MG TABLET        PEG-ELECTROLYTE SOLN (NULYTELY WITH FLAVOR PACKS) 420 GRAM SOLR        PHENAZOPYRIDINE (PYRIDIUM) 100 MG TABLET    Take 2 tablets (200 mg total) by mouth 3 (three) times daily as needed for Pain (bladder pain).    PREGABALIN (LYRICA) 50 MG CAPSULE        ROSUVASTATIN (CRESTOR) 20 MG TABLET    Take 20 mg by mouth every  "evening.    SUCRALFATE (CARAFATE) 1 GRAM TABLET        TAMSULOSIN (FLOMAX) 0.4 MG CAP    Take 1 capsule (0.4 mg total) by mouth Daily.    VENLAFAXINE (EFFEXOR-XR) 75 MG 24 HR CAPSULE    Take by mouth every morning.    VITAMIN D3 10 MCG (400 UNIT) TABLET    Take 1 tablet by mouth.    VITAMIN E 100 UNIT CAPSULE    Take 100 Units by mouth.   Changed and/or Refilled Medications    Modified Medication Previous Medication    CATHETER 10 FR MISC catheter 10 Fr Misc       1 Units by Misc.(Non-Drug; Combo Route) route 6 (six) times daily.    1 Units by Misc.(Non-Drug; Combo Route) route 3 (three) times daily.    GABAPENTIN (NEURONTIN) 300 MG CAPSULE gabapentin (NEURONTIN) 300 MG capsule       Take 1 capsule (300 mg total) by mouth 3 (three) times daily.    Take 1 capsule (300 mg total) by mouth 3 (three) times daily.    HYDROXYZINE HCL (ATARAX) 25 MG TABLET hydrOXYzine HCL (ATARAX) 25 MG tablet       Take 1 tablet (25 mg total) by mouth every evening.    Take 1 tablet (25 mg total) by mouth 3 (three) times daily.    PENTOSAN POLYSULFATE (ELMIRON) 100 MG CAP pentosan polysulfate (ELMIRON) 100 mg Cap       Take 1 capsule (100 mg total) by mouth 3 (three) times daily.    Take 1 capsule (100 mg total) by mouth 3 (three) times daily.   Discontinued Medications    URINARY BAG-CATHETER (VAPRO PLUS INTERMITT CATHETER) 12 FR- 8" CMPK    1 Units by Misc.(Non-Drug; Combo Route) route 6 (six) times daily.        Past Social History:   Social History     Socioeconomic History    Marital status:    Tobacco Use    Smoking status: Never    Smokeless tobacco: Never   Substance and Sexual Activity    Alcohol use: Never    Drug use: Yes     Types: Oxycodone, Hydrocodone    Sexual activity: Yes     Partners: Male       Allergies:   Review of patient's allergies indicates:   Allergen Reactions    Iodine and iodide containing products Rash and Swelling    Shellfish containing products Rash and Swelling    Sulfa (sulfonamide antibiotics) " "Rash and Swelling    Adhesive Hives     Had reaction to surgical tape after last procedure    Sertraline Hives    Xanax [alprazolam] Hives    Vancomycin analogues Itching     Pt had itching, redness to neck/face, and "feeling hot"         Family History:   Family History   Problem Relation Age of Onset    No Known Problems Father     Heart disease Mother         Review of Systems:  Review of Systems   Constitutional:  Negative for activity change, appetite change, chills, diaphoresis, fatigue, fever and unexpected weight change.   HENT:  Negative for congestion, dental problem, drooling, ear discharge, ear pain, facial swelling, hearing loss, mouth sores, nosebleeds, postnasal drip, rhinorrhea, sinus pressure, sinus pain, sneezing, sore throat, tinnitus, trouble swallowing and voice change.    Eyes:  Negative for photophobia, pain, discharge, redness, itching and visual disturbance.   Respiratory:  Negative for apnea, cough, choking, chest tightness, shortness of breath, wheezing and stridor.    Cardiovascular:  Negative for chest pain and leg swelling.   Gastrointestinal:  Negative for abdominal distention, abdominal pain, anal bleeding, blood in stool, constipation, diarrhea, nausea, rectal pain and vomiting.   Endocrine: Negative for cold intolerance, heat intolerance, polydipsia, polyphagia and polyuria.   Genitourinary:  Positive for difficulty urinating. Negative for decreased urine volume, dyspareunia, dysuria, enuresis, flank pain, frequency, genital sores, hematuria, menstrual problem, pelvic pain, urgency, vaginal bleeding, vaginal discharge and vaginal pain.   Musculoskeletal:  Negative for arthralgias, back pain, gait problem, joint swelling, myalgias, neck pain and neck stiffness.   Skin:  Negative for color change, pallor, rash and wound.   Allergic/Immunologic: Negative for environmental allergies, food allergies and immunocompromised state.   Neurological:  Negative for dizziness, tremors, seizures, " syncope, facial asymmetry, speech difficulty, weakness, light-headedness, numbness and headaches.   Hematological:  Negative for adenopathy. Does not bruise/bleed easily.   Psychiatric/Behavioral:  Negative for agitation, behavioral problems, confusion, decreased concentration, dysphoric mood, hallucinations, self-injury, sleep disturbance and suicidal ideas. The patient is not nervous/anxious and is not hyperactive.        Physical Exam:  Physical Exam  Exam conducted with a chaperone present.   Constitutional:       Appearance: Normal appearance.   HENT:      Head: Normocephalic.      Nose: Nose normal.      Mouth/Throat:      Mouth: Mucous membranes are moist.      Pharynx: Oropharynx is clear.   Eyes:      Extraocular Movements: Extraocular movements intact.      Conjunctiva/sclera: Conjunctivae normal.      Pupils: Pupils are equal, round, and reactive to light.   Cardiovascular:      Rate and Rhythm: Normal rate and regular rhythm.      Pulses: Normal pulses.      Heart sounds: Normal heart sounds.   Pulmonary:      Effort: Pulmonary effort is normal.      Breath sounds: Normal breath sounds.   Abdominal:      General: Abdomen is flat. Bowel sounds are normal.      Palpations: Abdomen is soft.      Hernia: There is no hernia in the left inguinal area or right inguinal area.   Genitourinary:     General: Normal vulva.      Pubic Area: No rash or pubic lice.       Labia:         Right: No rash, tenderness, lesion or injury.         Left: No rash, tenderness, lesion or injury.       Urethra: No prolapse, urethral pain, urethral swelling or urethral lesion.      Rectum: Normal.   Musculoskeletal:         General: Normal range of motion.      Cervical back: Normal range of motion and neck supple.   Lymphadenopathy:      Lower Body: No right inguinal adenopathy. No left inguinal adenopathy.   Skin:     General: Skin is warm and dry.      Capillary Refill: Capillary refill takes less than 2 seconds.   Neurological:  "     General: No focal deficit present.      Mental Status: She is alert and oriented to person, place, and time. Mental status is at baseline.   Psychiatric:         Mood and Affect: Mood normal.         Behavior: Behavior normal.         Thought Content: Thought content normal.         Judgment: Judgment normal.         Assessment/Plan:       Bladder pain  -     phenazopyridine (PYRIDIUM) 200 MG tablet; Take 1 tablet (200 mg total) by mouth 3 (three) times daily as needed for Pain.  Dispense: 45 tablet; Refill: 3  -     catheter 10 Fr Misc; 1 Units by Misc.(Non-Drug; Combo Route) route 6 (six) times daily.  Dispense: 180 each; Refill: 99    IC (interstitial cystitis)  -     hydrOXYzine HCL (ATARAX) 25 MG tablet; Take 1 tablet (25 mg total) by mouth every evening.  Dispense: 90 tablet; Refill: 3  -     pentosan polysulfate (ELMIRON) 100 mg Cap; Take 1 capsule (100 mg total) by mouth 3 (three) times daily.  Dispense: 90 capsule; Refill: 11  -     Urine culture  -     heparin (porcine) injection 20,000 Units  -     LIDOcaine HCL 10 mg/ml (1%) injection 10 mL  -     BUPivacaine injection 125 mg  -     sodium bicarbonate solution 50 mEq  -     catheter 10 Fr Misc; 1 Units by Misc.(Non-Drug; Combo Route) route 6 (six) times daily.  Dispense: 180 each; Refill: 99    Pelvic pain in female  -     gabapentin (NEURONTIN) 300 MG capsule; Take 1 capsule (300 mg total) by mouth 3 (three) times daily.  Dispense: 90 capsule; Refill: 11    Fibromyalgia muscle pain  -     gabapentin (NEURONTIN) 300 MG capsule; Take 1 capsule (300 mg total) by mouth 3 (three) times daily.  Dispense: 90 capsule; Refill: 11    Incomplete bladder emptying  -     Discontinue: urinary bag-catheter (VAPRO PLUS INTERMITT CATHETER) 12 Fr- 8" Cmpk; 1 Units by Misc.(Non-Drug; Combo Route) route 6 (six) times daily.  Dispense: 180 each; Refill: 99  -     catheter 10 Fr Misc; 1 Units by Misc.(Non-Drug; Combo Route) route 6 (six) times daily.  Dispense: 180 " each; Refill: 99         Overactive bladder/interstitial cystitis:  Patient presenting with a complaint of urinary retention however she is only retaining a small residual volume.  She is self catheterizing up to 6 times a day in order to relieve the pressure and bladder pain.    She only able to get 100 cc of urine out each time per catheterization..    Patient has a history of interstitial cystitis and will evaluate her with cysto under anesthesia..    Continue IC smart diet, drinking alkaline water and IC meds including Elmiron, Neurontin, and hydroxyzine 25 mg q hs.  Use pyridium for bladder pain.    Continue flomax.  S/p InterStim Therapy.  May consider bilateral InterStim Therapy if desires to see whether it will help her to urinate easier.    Don't take Cranberry Pills / Juice because of its acidity.    Bladder instillation given today as above.    I spent 40 minutes with the patient of which more than half was spent in direct consultation with the patient in regards to our treatment and plan.     Follow up in about 23 days (around 1/10/2024), or cysto hydrodistention, bladder instillation.

## 2023-12-18 NOTE — PROGRESS NOTES
[employer] PSR study   IRB number: [id number]  PI: Dr. Donny Calle  Patient number: [id number]      1. FOLLOW-UP   1.1  Follow-up visit type: (calculated field): 12 month    1.2  Visit setting:  In-office follow-up   1.3  Follow-up site/clinic: [facility] [place]    1.4  Follow-up clinician: Dr. Donny Calle   2. EVENTS ASSESSMENT  Have the patient's medical records been reviewed to ensure that all events that occurred since the last visit have been reported?       2.1  Neurostimulator related events:    Yes   2.2  Lead related events:  Yes   2.3  Extension related events:    Not Applicable   2.4.  Programming/stimulation related events:    Yes   If any event occurred since the last visit, please review the events that have been entered and determine whether the event needs to be reported.     3.  OTHER HOSPITAL ADMISSIONS  Please record any hospitalizations unrelated to the items entered above as those hospitalizations will be recorded on the event form(s):    3.1  Has the patient had any other hospital admission(s) (> 24 hours inpatient stay) since the previous visit: No            4.  MRI INFORMATION   4.1  Has the patient received a MRI scan since the last follow-up:     No           5. DEVICE DATA  5.1 Was the Programming CRF completed?     No          6.  OUTCOME MEASURES- called patient [id number]  OAB-q short form, FIQOL outcome measures were not included with PSR CIP versions 6.0-9.0.   Patient is unable to complete outcome measures due to cognitive, communication deficits and/or age/development level     6.1 Was the Patient Global Impression of Improvement (PGI-I) completed by the patient?    Yes     Check the one number that best describes how your bladder or bowl condition is now, compared with how it was before you received the InterStim Implant:  4 No change      6.2 Was the Urinary Incontinence Quality of Life (OAB-q short form) completed by the patient?  Yes     16-Mar-2023 V 8   For the following  questions please think about your overall bladder symptoms in the past 4 weeks and how these symptoms have affected your life. Please answer each question about how often you have felt this way to the best of your ability. Please place ? or X in the box that best answers each question       During past 4 weeks how bothered were you by  Not at all  A little bit  Somewhat  Quite a bit A great deal  A very great deal    An uncomfortable urge to urinate ?  ?        A sudden urge to urinate with little or no warning ?  ?        Accidental loss of small amounts of urine?   ?        Nighttime urination ?   ?       Waking up at night because you had to urinate ? ?         Urine loss associated with a strong desire to urinate ?  ?        Caused you to plan escape routes to restrooms in public places?       ?   Made you feel like there is something wrong with you ? ?        Interfered with your ability to get a good night's rest ? ?        Made you frustrated or annoyed about the amount of time you spend in the restroom ?      ?   Made you avoid activities away from restrooms (i.e., walks, running, hiking)?      ?   Awakened you during sleep? ?        Caused you to decrease your physical activities (exercising, sports, etc)?      ?   Caused you to have problems with your partner or spouse ? ?        Made you uncomfortable while traveling with others because of needing to stop for a restroom ?         Affected your relationships with family and friends ? ?        Interfered with getting the amount of sleep you needed ? ?        Caused you embarrassment ? ?        Caused you to locate the closest restroom as soon as you arrive at a place you have never been?       ?         6.3   Was the Fecal Incontinence Quality of Life (FIQOL) completed by the patient?   Patient does not have bowel disorder

## 2023-12-19 ENCOUNTER — TELEPHONE (OUTPATIENT)
Dept: UROLOGY | Facility: CLINIC | Age: 43
End: 2023-12-19
Payer: COMMERCIAL

## 2023-12-19 DIAGNOSIS — N30.10 IC (INTERSTITIAL CYSTITIS): ICD-10-CM

## 2023-12-19 DIAGNOSIS — R33.9 INCOMPLETE BLADDER EMPTYING: ICD-10-CM

## 2023-12-19 DIAGNOSIS — R39.89 BLADDER PAIN: Primary | ICD-10-CM

## 2023-12-19 LAB — BACTERIA UR CULT: NO GROWTH

## 2023-12-19 NOTE — TELEPHONE ENCOUNTER
Bladder pain  -     Case Request Operating Room: CYSTOSCOPY, WITH BLADDER HYDRODISTENSION, INSTILLATION, BLADDER, BIOPSY, BLADDER    Incomplete bladder emptying  -     Case Request Operating Room: CYSTOSCOPY, WITH BLADDER HYDRODISTENSION, INSTILLATION, BLADDER, BIOPSY, BLADDER    IC (interstitial cystitis)  -     Case Request Operating Room: CYSTOSCOPY, WITH BLADDER HYDRODISTENSION, INSTILLATION, BLADDER, BIOPSY, BLADDER

## 2023-12-19 NOTE — TELEPHONE ENCOUNTER
Called and spoke to the pt to confirm surgery date of 1/10/24, pt accepted. Informed the pt I will call the day before surgery with arrival time and pre-op instructions. Pt verbalized understanding.

## 2024-01-09 ENCOUNTER — TELEPHONE (OUTPATIENT)
Dept: UROLOGY | Facility: CLINIC | Age: 44
End: 2024-01-09
Payer: COMMERCIAL

## 2024-01-09 NOTE — TELEPHONE ENCOUNTER
Called pt to confirm arrival time of 10:30am for procedure on 1/10/24. Gave pt NPO instructions and gave pt opportunity to ask questions. Pt verbalized understanding.

## 2024-01-10 ENCOUNTER — HOSPITAL ENCOUNTER (OUTPATIENT)
Facility: HOSPITAL | Age: 44
Discharge: HOME | End: 2024-01-10
Attending: UROLOGY | Admitting: UROLOGY
Payer: COMMERCIAL

## 2024-01-10 ENCOUNTER — ANESTHESIA EVENT (OUTPATIENT)
Dept: SURGERY | Facility: HOSPITAL | Age: 44
End: 2024-01-10
Payer: COMMERCIAL

## 2024-01-10 ENCOUNTER — ANESTHESIA (OUTPATIENT)
Dept: SURGERY | Facility: HOSPITAL | Age: 44
End: 2024-01-10
Payer: COMMERCIAL

## 2024-01-10 VITALS
HEIGHT: 62 IN | BODY MASS INDEX: 29.63 KG/M2 | HEART RATE: 68 BPM | WEIGHT: 161 LBS | DIASTOLIC BLOOD PRESSURE: 92 MMHG | SYSTOLIC BLOOD PRESSURE: 137 MMHG | OXYGEN SATURATION: 96 % | RESPIRATION RATE: 15 BRPM | TEMPERATURE: 98 F

## 2024-01-10 DIAGNOSIS — N30.10 INTERSTITIAL CYSTITIS: ICD-10-CM

## 2024-01-10 PROCEDURE — 36000706: Performed by: UROLOGY

## 2024-01-10 PROCEDURE — 71000044 HC DOSC ROUTINE RECOVERY FIRST HOUR: Performed by: UROLOGY

## 2024-01-10 PROCEDURE — 63600175 PHARM REV CODE 636 W HCPCS: Performed by: UROLOGY

## 2024-01-10 PROCEDURE — 63600175 PHARM REV CODE 636 W HCPCS: Performed by: ANESTHESIOLOGY

## 2024-01-10 PROCEDURE — 25000003 PHARM REV CODE 250: Performed by: NURSE ANESTHETIST, CERTIFIED REGISTERED

## 2024-01-10 PROCEDURE — 71000015 HC POSTOP RECOV 1ST HR: Performed by: UROLOGY

## 2024-01-10 PROCEDURE — 52260 CYSTOSCOPY AND TREATMENT: CPT | Mod: ,,, | Performed by: UROLOGY

## 2024-01-10 PROCEDURE — 37000009 HC ANESTHESIA EA ADD 15 MINS: Performed by: UROLOGY

## 2024-01-10 PROCEDURE — 63600175 PHARM REV CODE 636 W HCPCS: Performed by: NURSE ANESTHETIST, CERTIFIED REGISTERED

## 2024-01-10 PROCEDURE — 25000003 PHARM REV CODE 250: Performed by: UROLOGY

## 2024-01-10 PROCEDURE — D9220A PRA ANESTHESIA: Mod: CRNA,,, | Performed by: NURSE ANESTHETIST, CERTIFIED REGISTERED

## 2024-01-10 PROCEDURE — 36000707: Performed by: UROLOGY

## 2024-01-10 PROCEDURE — 25000003 PHARM REV CODE 250: Performed by: STUDENT IN AN ORGANIZED HEALTH CARE EDUCATION/TRAINING PROGRAM

## 2024-01-10 PROCEDURE — 51700 IRRIGATION OF BLADDER: CPT | Mod: 51,,, | Performed by: UROLOGY

## 2024-01-10 PROCEDURE — 37000008 HC ANESTHESIA 1ST 15 MINUTES: Performed by: UROLOGY

## 2024-01-10 PROCEDURE — D9220A PRA ANESTHESIA: Mod: ANES,,, | Performed by: ANESTHESIOLOGY

## 2024-01-10 RX ORDER — IBUPROFEN 800 MG/1
800 TABLET ORAL 3 TIMES DAILY PRN
Qty: 9 TABLET | Refills: 0 | Status: SHIPPED | OUTPATIENT
Start: 2024-01-10 | End: 2024-01-13

## 2024-01-10 RX ORDER — INDOMETHACIN 25 MG/1
CAPSULE ORAL
Status: DISCONTINUED | OUTPATIENT
Start: 2024-01-10 | End: 2024-01-10 | Stop reason: HOSPADM

## 2024-01-10 RX ORDER — METOCLOPRAMIDE HYDROCHLORIDE 5 MG/ML
10 INJECTION INTRAMUSCULAR; INTRAVENOUS EVERY 10 MIN PRN
Status: DISCONTINUED | OUTPATIENT
Start: 2024-01-10 | End: 2024-01-10 | Stop reason: HOSPADM

## 2024-01-10 RX ORDER — PHENAZOPYRIDINE HYDROCHLORIDE 100 MG/1
100 TABLET, FILM COATED ORAL ONCE
Status: COMPLETED | OUTPATIENT
Start: 2024-01-10 | End: 2024-01-10

## 2024-01-10 RX ORDER — PHENYLEPHRINE HYDROCHLORIDE 10 MG/ML
INJECTION INTRAVENOUS
Status: DISCONTINUED | OUTPATIENT
Start: 2024-01-10 | End: 2024-01-10

## 2024-01-10 RX ORDER — FENTANYL CITRATE 50 UG/ML
INJECTION, SOLUTION INTRAMUSCULAR; INTRAVENOUS
Status: DISCONTINUED | OUTPATIENT
Start: 2024-01-10 | End: 2024-01-10

## 2024-01-10 RX ORDER — ONDANSETRON 2 MG/ML
INJECTION INTRAMUSCULAR; INTRAVENOUS
Status: DISCONTINUED | OUTPATIENT
Start: 2024-01-10 | End: 2024-01-10

## 2024-01-10 RX ORDER — MIDAZOLAM HYDROCHLORIDE 1 MG/ML
INJECTION, SOLUTION INTRAMUSCULAR; INTRAVENOUS
Status: DISCONTINUED | OUTPATIENT
Start: 2024-01-10 | End: 2024-01-10

## 2024-01-10 RX ORDER — CEFAZOLIN SODIUM 1 G/3ML
INJECTION, POWDER, FOR SOLUTION INTRAMUSCULAR; INTRAVENOUS
Status: DISCONTINUED | OUTPATIENT
Start: 2024-01-10 | End: 2024-01-10

## 2024-01-10 RX ORDER — OXYCODONE AND ACETAMINOPHEN 5; 325 MG/1; MG/1
1 TABLET ORAL EVERY 4 HOURS PRN
Qty: 6 TABLET | Refills: 0 | Status: SHIPPED | OUTPATIENT
Start: 2024-01-10

## 2024-01-10 RX ORDER — LIDOCAINE HYDROCHLORIDE 10 MG/ML
INJECTION INFILTRATION; PERINEURAL
Status: DISCONTINUED | OUTPATIENT
Start: 2024-01-10 | End: 2024-01-10 | Stop reason: HOSPADM

## 2024-01-10 RX ORDER — LIDOCAINE HYDROCHLORIDE 20 MG/ML
INJECTION INTRAVENOUS
Status: DISCONTINUED | OUTPATIENT
Start: 2024-01-10 | End: 2024-01-10

## 2024-01-10 RX ORDER — KETOROLAC TROMETHAMINE 30 MG/ML
30 INJECTION, SOLUTION INTRAMUSCULAR; INTRAVENOUS ONCE
Status: COMPLETED | OUTPATIENT
Start: 2024-01-10 | End: 2024-01-10

## 2024-01-10 RX ORDER — HEPARIN SODIUM 1000 [USP'U]/ML
INJECTION, SOLUTION INTRAVENOUS; SUBCUTANEOUS
Status: DISCONTINUED | OUTPATIENT
Start: 2024-01-10 | End: 2024-01-10 | Stop reason: HOSPADM

## 2024-01-10 RX ORDER — LIDOCAINE HYDROCHLORIDE 20 MG/ML
JELLY TOPICAL
Status: DISCONTINUED | OUTPATIENT
Start: 2024-01-10 | End: 2024-01-10 | Stop reason: HOSPADM

## 2024-01-10 RX ORDER — BUPIVACAINE HYDROCHLORIDE 5 MG/ML
INJECTION, SOLUTION EPIDURAL; INTRACAUDAL
Status: DISCONTINUED
Start: 2024-01-10 | End: 2024-01-10 | Stop reason: HOSPADM

## 2024-01-10 RX ORDER — BUPIVACAINE HYDROCHLORIDE 5 MG/ML
INJECTION, SOLUTION EPIDURAL; INTRACAUDAL
Status: DISCONTINUED | OUTPATIENT
Start: 2024-01-10 | End: 2024-01-10 | Stop reason: HOSPADM

## 2024-01-10 RX ORDER — HEPARIN SODIUM 1000 [USP'U]/ML
INJECTION, SOLUTION INTRAVENOUS; SUBCUTANEOUS
Status: DISCONTINUED
Start: 2024-01-10 | End: 2024-01-10 | Stop reason: HOSPADM

## 2024-01-10 RX ORDER — PHENAZOPYRIDINE HYDROCHLORIDE 100 MG/1
100 TABLET, FILM COATED ORAL
Status: DISCONTINUED | OUTPATIENT
Start: 2024-01-10 | End: 2024-01-10

## 2024-01-10 RX ORDER — HYDROMORPHONE HYDROCHLORIDE 1 MG/ML
0.2 INJECTION, SOLUTION INTRAMUSCULAR; INTRAVENOUS; SUBCUTANEOUS EVERY 5 MIN PRN
Status: DISCONTINUED | OUTPATIENT
Start: 2024-01-10 | End: 2024-01-10 | Stop reason: HOSPADM

## 2024-01-10 RX ORDER — OXYCODONE AND ACETAMINOPHEN 5; 325 MG/1; MG/1
1 TABLET ORAL ONCE
Status: DISCONTINUED | OUTPATIENT
Start: 2024-01-10 | End: 2024-01-10 | Stop reason: HOSPADM

## 2024-01-10 RX ORDER — LIDOCAINE HYDROCHLORIDE 10 MG/ML
INJECTION INFILTRATION; PERINEURAL
Status: DISCONTINUED
Start: 2024-01-10 | End: 2024-01-10 | Stop reason: HOSPADM

## 2024-01-10 RX ORDER — PHENAZOPYRIDINE HYDROCHLORIDE 100 MG/1
100 TABLET, FILM COATED ORAL 3 TIMES DAILY PRN
Qty: 30 TABLET | Refills: 0 | Status: SHIPPED | OUTPATIENT
Start: 2024-01-10 | End: 2024-01-20

## 2024-01-10 RX ORDER — PROPOFOL 10 MG/ML
VIAL (ML) INTRAVENOUS CONTINUOUS PRN
Status: DISCONTINUED | OUTPATIENT
Start: 2024-01-10 | End: 2024-01-10

## 2024-01-10 RX ADMIN — FENTANYL CITRATE 50 MCG: 50 INJECTION, SOLUTION INTRAMUSCULAR; INTRAVENOUS at 01:01

## 2024-01-10 RX ADMIN — PHENYLEPHRINE HYDROCHLORIDE 100 MCG: 10 INJECTION INTRAVENOUS at 02:01

## 2024-01-10 RX ADMIN — KETOROLAC TROMETHAMINE 30 MG: 30 INJECTION INTRAMUSCULAR; INTRAVENOUS at 02:01

## 2024-01-10 RX ADMIN — PROPOFOL 200 MCG/KG/MIN: 10 INJECTION, EMULSION INTRAVENOUS at 01:01

## 2024-01-10 RX ADMIN — HYDROMORPHONE HYDROCHLORIDE 0.2 MG: 1 INJECTION, SOLUTION INTRAMUSCULAR; INTRAVENOUS; SUBCUTANEOUS at 03:01

## 2024-01-10 RX ADMIN — SODIUM CHLORIDE: 0.9 INJECTION, SOLUTION INTRAVENOUS at 01:01

## 2024-01-10 RX ADMIN — HYDROMORPHONE HYDROCHLORIDE 0.2 MG: 1 INJECTION, SOLUTION INTRAMUSCULAR; INTRAVENOUS; SUBCUTANEOUS at 02:01

## 2024-01-10 RX ADMIN — MIDAZOLAM HYDROCHLORIDE 2 MG: 1 INJECTION, SOLUTION INTRAMUSCULAR; INTRAVENOUS at 01:01

## 2024-01-10 RX ADMIN — ONDANSETRON 4 MG: 2 INJECTION INTRAMUSCULAR; INTRAVENOUS at 01:01

## 2024-01-10 RX ADMIN — LIDOCAINE HYDROCHLORIDE 50 MG: 20 INJECTION INTRAVENOUS at 01:01

## 2024-01-10 RX ADMIN — PHENAZOPYRIDINE HYDROCHLORIDE 100 MG: 100 TABLET ORAL at 02:01

## 2024-01-10 RX ADMIN — CEFAZOLIN 2 G: 330 INJECTION, POWDER, FOR SOLUTION INTRAMUSCULAR; INTRAVENOUS at 01:01

## 2024-01-10 NOTE — DISCHARGE INSTRUCTIONS
Post Cystoscopy Instructions  Do not strain to have a bowel movement  No strenuous exercise x 7 days  No driving while you are on narcotic pain medications or if your barrientos  catheter is in place    You can expect:  See blood in your urine for a few days    If you have a catheter, please return to the ER if your catheter stops draining or you are having abdominal pain.    Call the doctor if:  Temperature is greater than 101F  Persistent vomiting and inability to keep food down  Inability to void if you do not have a catheter

## 2024-01-10 NOTE — ANESTHESIA POSTPROCEDURE EVALUATION
Anesthesia Post Evaluation    Patient: Chelsea Oconnell    Procedure(s) Performed: Procedure(s) (LRB):  CYSTOSCOPY, WITH BLADDER HYDRODISTENSION (N/A)  INSTILLATION, BLADDER (N/A)    Final Anesthesia Type: general      Patient location during evaluation: PACU  Patient participation: Yes- Able to Participate  Level of consciousness: awake and alert  Post-procedure vital signs: reviewed and stable  Pain management: adequate  Airway patency: patent    PONV status at discharge: No PONV  Anesthetic complications: no      Cardiovascular status: hemodynamically stable and blood pressure returned to baseline  Respiratory status: unassisted  Hydration status: euvolemic  Follow-up not needed.          Vitals Value Taken Time   /95 01/10/24 1501   Temp 36.4 °C (97.5 °F) 01/10/24 1419   Pulse 88 01/10/24 1512   Resp 15 01/10/24 1454   SpO2 99 % 01/10/24 1512   Vitals shown include unvalidated device data.      No case tracking events are documented in the log.      Pain/Tatum Score: Pain Rating Prior to Med Admin: 10 (1/10/2024  2:54 PM)

## 2024-01-10 NOTE — DISCHARGE SUMMARY
[facility]  Discharge Note  Short Stay    Admit Date: 1/10/2024    Discharge Date and Time: 01/10/2024 2:16 PM      Attending Physician: Donny Calle MD     Discharge Provider: Oneida Hinson    Diagnoses:  Past Medical History:   Diagnosis Date    Crohn's colitis     High cholesterol     Interstitial cystitis     Lupus (systemic lupus erythematosus)     Urinary tract infection        Discharged Condition: good    Hospital Course: Patient was admitted for cysto and tolerated the procedure well with no complications. The patient was discharged home in good condition on the same day.       Final Diagnoses: Same as principal problem.    Disposition: Home or Self Care    Follow up/Patient Instructions:    Medications:  Reconciled Home Medications:   Current Discharge Medication List        START taking these medications    Details   ibuprofen (ADVIL,MOTRIN) 800 MG tablet Take 1 tablet (800 mg total) by mouth 3 (three) times daily as needed for Pain.  Qty: 9 tablet, Refills: 0      oxyCODONE-acetaminophen (PERCOCET) 5-325 mg per tablet Take 1 tablet by mouth every 4 (four) hours as needed for Pain.  Qty: 6 tablet, Refills: 0    Comments: Quantity prescribed more than 7 day supply? No      !! phenazopyridine (PYRIDIUM) 100 MG tablet Take 1 tablet (100 mg total) by mouth 3 (three) times daily as needed for Pain.  Qty: 30 tablet, Refills: 0       !! - Potential duplicate medications found. Please discuss with provider.        CONTINUE these medications which have NOT CHANGED    Details   albuterol (PROVENTIL/VENTOLIN HFA) 90 mcg/actuation inhaler SMARTSI Puff(s) Via Inhaler 4 Times Daily PRN      albuterol-ipratropium (DUO-NEB) 2.5 mg-0.5 mg/3 mL nebulizer solution Take by nebulization.      catheter 10 Fr Misc 1 Units by Misc.(Non-Drug; Combo Route) route 6 (six) times daily.  Qty: 180 each, Refills: 99    Associated Diagnoses: IC (interstitial cystitis); Bladder pain; Incomplete bladder emptying       estradioL (VIVELLE-DOT) 0.1 mg/24 hr PTSW       FISH OIL 1,000 mg (120 mg-180 mg) Cap Take 1 capsule by mouth.      gabapentin (NEURONTIN) 300 MG capsule Take 1 capsule (300 mg total) by mouth 3 (three) times daily.  Qty: 90 capsule, Refills: 11    Associated Diagnoses: Pelvic pain in female; Fibromyalgia muscle pain      hydrOXYchloroQUINE (PLAQUENIL) 200 mg tablet Take 200 mg by mouth once daily. TAKES MID DAY      meloxicam (MOBIC) 15 MG tablet       multivitamin-Ca-iron-minerals 27-0.4 mg Tab Take by mouth once daily.      oxyCODONE (ROXICODONE) 5 MG immediate release tablet Take 1 tablet (5 mg total) by mouth every 6 (six) hours as needed for Pain.  Qty: 5 tablet, Refills: 0      pentosan polysulfate (ELMIRON) 100 mg Cap Take 1 capsule (100 mg total) by mouth 3 (three) times daily.  Qty: 90 capsule, Refills: 11    Associated Diagnoses: IC (interstitial cystitis)      rosuvastatin (CRESTOR) 20 MG tablet Take 20 mg by mouth every evening.      sucralfate (CARAFATE) 1 gram tablet       tamsulosin (FLOMAX) 0.4 mg Cap Take 1 capsule (0.4 mg total) by mouth Daily.  Qty: 90 capsule, Refills: 3    Associated Diagnoses: OAB (overactive bladder); Pelvic pain; Incomplete bladder emptying      venlafaxine (EFFEXOR-XR) 75 MG 24 hr capsule Take by mouth every morning.      VITAMIN D3 10 mcg (400 unit) tablet Take 1 tablet by mouth.      vitamin E 100 UNIT capsule Take 100 Units by mouth.      hydrOXYzine HCL (ATARAX) 25 MG tablet Take 1 tablet (25 mg total) by mouth every evening.  Qty: 90 tablet, Refills: 3    Associated Diagnoses: IC (interstitial cystitis)      naloxone (NARCAN) 4 mg/actuation Spry       pantoprazole (PROTONIX) 40 MG tablet       peg-electrolyte soln (NULYTELY WITH FLAVOR PACKS) 420 gram SolR       !! phenazopyridine (PYRIDIUM) 100 MG tablet Take 2 tablets (200 mg total) by mouth 3 (three) times daily as needed for Pain (bladder pain).  Qty: 30 tablet, Refills: 11    Associated Diagnoses: Pelvic pain       pregabalin (LYRICA) 50 MG capsule        !! - Potential duplicate medications found. Please discuss with provider.        No discharge procedures on file.   Follow-up Information       Donny Calle MD Follow up in 3 month(s).    Specialty: Urology  Why: Follow up  Contact information:  [address]  [facility] [address]  [phone]                             Discharge Procedure Orders (must include Diet, Follow-up, Activity):  No discharge procedures on file.

## 2024-01-10 NOTE — TRANSFER OF CARE
"Anesthesia Transfer of Care Note    Patient: Chelsea Oconnell    Procedure(s) Performed: Procedure(s) (LRB):  CYSTOSCOPY, WITH BLADDER HYDRODISTENSION (N/A)  INSTILLATION, BLADDER (N/A)    Patient location: PACU    Anesthesia Type: general    Transport from OR: Transported from OR on room air with adequate spontaneous ventilation    Post pain: adequate analgesia    Post assessment: no apparent anesthetic complications    Post vital signs: stable    Level of consciousness: awake and alert    Nausea/Vomiting: no nausea/vomiting    Complications: none    Transfer of care protocol was followed      Last vitals: Visit Vitals  BP 97/59   Pulse 80   Temp 36.6 °C (97.9 °F) (Temporal)   Resp 18   Ht 5' 2" (1.575 m)   Wt 73 kg (161 lb)   SpO2 94%   Breastfeeding No   BMI 29.45 kg/m²     "

## 2024-01-10 NOTE — OP NOTE
[facility]  Operative Note    Date: 01/10/2024    Pre-Op Diagnosis:   - interstitial cystitis  Patient Active Problem List    Diagnosis Date Noted    Lupus 08/10/2023    Body fluid retention 08/10/2023    Infection associated with generator of implanted electronic neurostimulator 07/26/2022    Open wound of right buttock 07/25/2022    Mechanical breakdown of generator of implanted electronic neurostimulator 07/07/2022    Fibromyalgia 06/02/2020    Overactive bladder 08/14/2019    Incomplete bladder emptying 07/16/2019    Pelvic pain in female 07/16/2019    IC (interstitial cystitis) 05/07/2019    Chronic bladder pain 05/07/2019    OAB (overactive bladder) 05/07/2019     Post-Op Diagnosis: same    Procedure(s) Performed:   1.  Cystoscopy with hydrodistension    Specimen(s): none    Staff Surgeon: Donny Calle MD    Assistant Surgeon: Oneida Hinson MD ; Ham Gaines MD    Anesthesia: General mask inhalational anesthesia    Indications: Chelsea Oconnell is a 43 y.o. female with interstitial cystitis presenting for hydrodistension.    Findings:   Bladder capacity was 800 then 850    Estimated Blood Loss: min    Drains: none    Procedure in detail: After risks, benefits and possible complications of hydro distention were discussed with the patient informed consent was obtained. All questions were answered in the pre-operative area.  The patient was transferred to the cystoscopy suite and placed in the supine position.  SCDs were applied and working.  Anesthesia was administered.  After adequate anesthesia the patient was placed in the dorsal lithotomy position and prepped and draped in the usual sterile fashion. Time out was preformed and kerrie-procedural antibiotics were confirmed.    A rigid cystoscope in a 22 Fr sheath was introduced into the bladder per urethra. This passed easily. The entire urethra was visualized which showed no masses or strictures. The right and left ureteral  orifices were identified in the normal anatomic position. Formal cystoscopy was performed, which revealed no masses or lesions suspicious for malignancy, no trabeculations, no bladder stones and no bladder diverticuli.     The bladder was then filled with sterile water until equilibrium was reached at 80 cm of water. The capacity of the bladder was 800. This was repeated and the second bladder capacity was 850. There was not terminal hematuria seen. There were no ulcerations seen. There were glomerulations seen.    The bladder was drained and the cystoscope removed.  A 16 Fr barrientos catheter was placed.  A 200mL concoction of 40 000 U heparin, 1 % lidocaine, 0.5% bupivicaine, and 8.4% NaHCO3 was instilled into the bladder with instructions to keep the instillation in the bladder for at least 30 minutes postoperatively.    The patient was removed from lithotomy and transferred to recovery in stable condition.    Disposition:  The patient will follow up with Dr. Calle in 3 months.    Oneida Hinson MD

## 2024-01-10 NOTE — PLAN OF CARE
Patient is stable and ready for discharge. Instructions and prescription given to patient and spouse. Questions answered. Patient tolerating po liquids with no difficulty. Patient has no pain or states it is a tolerable level for them.

## 2024-01-10 NOTE — ANESTHESIA PREPROCEDURE EVALUATION
01/10/2024  Chelsea Oconnell is a 43 y.o., female with a hx of incomplete bladder emptying/interstitial cystitis here for cystoscopy    Pre-op Assessment    I have reviewed the Patient Summary Reports.     I have reviewed the Nursing Notes. I have reviewed the NPO Status.   I have reviewed the Medications.     Review of Systems  Anesthesia Hx:  No problems with previous Anesthesia                Social:  Non-Smoker       Renal/:     Incomplete bladder empyting/ interstitial cystitis             Neurological:    Neuromuscular Disease,                                   Dermatological:  Hx of lupus, no cardiac etiology       Physical Exam  General: Well nourished, Cooperative and Alert    Airway:  Mallampati: I   Mouth Opening: Normal  TM Distance: Normal  Neck ROM: Normal ROM    Chest/Lungs:  Clear to auscultation    Heart:  Rate: Normal  Rhythm: Regular Rhythm  Sounds: Normal    Anesthesia Plan  Type of Anesthesia, risks & benefits discussed:    Anesthesia Type: Gen ETT, Gen Supraglottic Airway  Post Op Pain Control Plan: multimodal analgesia and IV/PO Opioids PRN  Airway Plan: Direct, Post-Induction  Informed Consent: Patient consented to blood products? No  ASA Score: 2  Day of Surgery Review of History & Physical: H&P Update referred to the surgeon/provider.    Ready For Surgery From Anesthesia Perspective.   .

## 2024-01-11 ENCOUNTER — TELEPHONE (OUTPATIENT)
Dept: UROLOGY | Facility: CLINIC | Age: 44
End: 2024-01-11
Payer: COMMERCIAL

## 2024-01-11 DIAGNOSIS — M62.89 PELVIC FLOOR DYSFUNCTION IN FEMALE: ICD-10-CM

## 2024-01-11 DIAGNOSIS — R10.2 PELVIC PAIN: Primary | ICD-10-CM

## 2024-01-11 DIAGNOSIS — N30.10 IC (INTERSTITIAL CYSTITIS): ICD-10-CM

## 2024-01-11 RX ORDER — TAMSULOSIN HYDROCHLORIDE 0.4 MG/1
0.4 CAPSULE ORAL NIGHTLY
Qty: 90 CAPSULE | Refills: 3 | Status: SHIPPED | OUTPATIENT
Start: 2024-01-11 | End: 2025-01-10

## 2024-01-11 RX ORDER — PHENAZOPYRIDINE HYDROCHLORIDE 200 MG/1
200 TABLET, FILM COATED ORAL 3 TIMES DAILY PRN
Qty: 45 TABLET | Refills: 3 | Status: SHIPPED | OUTPATIENT
Start: 2024-01-11 | End: 2024-01-26

## 2024-01-11 RX ORDER — POTASSIUM CITRATE 10 MEQ/1
10 TABLET, EXTENDED RELEASE ORAL
Qty: 90 TABLET | Refills: 11 | Status: SHIPPED | OUTPATIENT
Start: 2024-01-11 | End: 2025-01-10

## 2024-01-11 RX ORDER — DIAZEPAM 10 MG/1
10 TABLET ORAL 2 TIMES DAILY
Qty: 60 TABLET | Refills: 1 | Status: SHIPPED | OUTPATIENT
Start: 2024-01-11 | End: 2024-02-10

## 2024-01-11 NOTE — TELEPHONE ENCOUNTER
Pelvic pain  -     tamsulosin (FLOMAX) 0.4 mg Cap; Take 1 capsule (0.4 mg total) by mouth every evening.  Dispense: 90 capsule; Refill: 3  -     diazePAM (VALIUM) 10 MG Tab; Take 1 tablet (10 mg total) by mouth 2 (two) times a day.  Dispense: 60 tablet; Refill: 1    IC (interstitial cystitis)  -     potassium citrate (UROCIT-K 10) 10 mEq (1,080 mg) TbSR; Take 1 tablet (10 mEq total) by mouth 3 (three) times daily with meals.  Dispense: 90 tablet; Refill: 11  -     phenazopyridine (PYRIDIUM) 200 MG tablet; Take 1 tablet (200 mg total) by mouth 3 (three) times daily as needed for Pain.  Dispense: 45 tablet; Refill: 3    Pelvic floor dysfunction in female  -     tamsulosin (FLOMAX) 0.4 mg Cap; Take 1 capsule (0.4 mg total) by mouth every evening.  Dispense: 90 capsule; Refill: 3  -     diazePAM (VALIUM) 10 MG Tab; Take 1 tablet (10 mg total) by mouth 2 (two) times a day.  Dispense: 60 tablet; Refill: 1       Please have her follow up with me in 3 months

## 2024-01-11 NOTE — TELEPHONE ENCOUNTER
----- Message from Cele Church LPN sent at 1/11/2024 11:01 AM CST -----  Regarding: FW: CYSTOSCOPY, WITH BLADDER HYDRODISTENSION results  Contact: [phone]  Pt states she did not get to talk to you after her procedure yesterday and would like to know how her bladder looked? Did you biopsy? ( I told her that I did not see any pathology, but would ask)   ----- Message -----  From: Jennifer Hogue  Sent: 1/11/2024   9:55 AM CST  To: Luc [facility] Staff  Subject: CYSTOSCOPY, WITH BLADDER HYDRODISTENSION res#    Calling to speak with provider or nurse regarding CYSTOSCOPY, WITH BLADDER HYDRODISTENSION results. Please call and discuss with patient as soon as possible.

## 2024-06-03 ENCOUNTER — PATIENT MESSAGE (OUTPATIENT)
Dept: UROLOGY | Facility: CLINIC | Age: 44
End: 2024-06-03
Payer: COMMERCIAL

## 2024-08-21 ENCOUNTER — TELEPHONE (OUTPATIENT)
Dept: UROLOGY | Facility: CLINIC | Age: 44
End: 2024-08-21
Payer: COMMERCIAL

## 2024-08-23 ENCOUNTER — OFFICE VISIT (OUTPATIENT)
Dept: UROLOGY | Facility: CLINIC | Age: 44
End: 2024-08-23
Payer: COMMERCIAL

## 2024-08-23 ENCOUNTER — TELEPHONE (OUTPATIENT)
Dept: UROLOGY | Facility: CLINIC | Age: 44
End: 2024-08-23

## 2024-08-23 ENCOUNTER — HOSPITAL ENCOUNTER (OUTPATIENT)
Dept: RADIOLOGY | Facility: HOSPITAL | Age: 44
Discharge: HOME | End: 2024-08-23
Attending: UROLOGY
Payer: COMMERCIAL

## 2024-08-23 VITALS
HEART RATE: 80 BPM | WEIGHT: 164.88 LBS | DIASTOLIC BLOOD PRESSURE: 93 MMHG | BODY MASS INDEX: 30.34 KG/M2 | HEIGHT: 62 IN | SYSTOLIC BLOOD PRESSURE: 125 MMHG

## 2024-08-23 DIAGNOSIS — N30.10 IC (INTERSTITIAL CYSTITIS): ICD-10-CM

## 2024-08-23 DIAGNOSIS — N39.46 MIXED INCONTINENCE URGE AND STRESS: ICD-10-CM

## 2024-08-23 DIAGNOSIS — N39.0 RECURRENT UTI: ICD-10-CM

## 2024-08-23 DIAGNOSIS — N32.81 OVERACTIVE BLADDER: ICD-10-CM

## 2024-08-23 DIAGNOSIS — Z96.82 NEUROSTIMULATOR DEVICE IN SITU: ICD-10-CM

## 2024-08-23 DIAGNOSIS — R39.82 CHRONIC BLADDER PAIN: ICD-10-CM

## 2024-08-23 DIAGNOSIS — R39.89 BLADDER PAIN: ICD-10-CM

## 2024-08-23 DIAGNOSIS — Z96.82 NEUROSTIMULATOR DEVICE IN SITU: Primary | ICD-10-CM

## 2024-08-23 DIAGNOSIS — N32.81 OVERACTIVE BLADDER: Primary | ICD-10-CM

## 2024-08-23 PROCEDURE — 72220 X-RAY EXAM SACRUM TAILBONE: CPT | Mod: TC

## 2024-08-23 PROCEDURE — 99999 PR PBB SHADOW E&M-EST. PATIENT-LVL V: CPT | Mod: PBBFAC,,, | Performed by: UROLOGY

## 2024-08-23 PROCEDURE — 72170 X-RAY EXAM OF PELVIS: CPT | Mod: 26,,, | Performed by: RADIOLOGY

## 2024-08-23 PROCEDURE — 87086 URINE CULTURE/COLONY COUNT: CPT | Performed by: UROLOGY

## 2024-08-23 PROCEDURE — 72220 X-RAY EXAM SACRUM TAILBONE: CPT | Mod: 26,,, | Performed by: RADIOLOGY

## 2024-08-23 PROCEDURE — 72170 X-RAY EXAM OF PELVIS: CPT | Mod: TC

## 2024-08-23 RX ORDER — LIDOCAINE HYDROCHLORIDE 10 MG/ML
30 INJECTION, SOLUTION INFILTRATION; PERINEURAL
Status: COMPLETED | OUTPATIENT
Start: 2024-08-23 | End: 2024-08-23

## 2024-08-23 RX ORDER — SODIUM BICARBONATE 42 MG/ML
10 INJECTION, SOLUTION INTRAVENOUS
Status: SHIPPED | OUTPATIENT
Start: 2024-08-23

## 2024-08-23 RX ORDER — NITROFURANTOIN (MACROCRYSTALS) 100 MG/1
100 CAPSULE ORAL DAILY
Qty: 100 CAPSULE | Refills: 0 | Status: SHIPPED | OUTPATIENT
Start: 2024-08-23 | End: 2024-12-01

## 2024-08-23 RX ORDER — HEPARIN SODIUM 10000 [USP'U]/ML
20000 INJECTION, SOLUTION INTRAVENOUS; SUBCUTANEOUS
Status: SHIPPED | OUTPATIENT
Start: 2024-08-23

## 2024-08-23 RX ORDER — BUPIVACAINE HYDROCHLORIDE 5 MG/ML
25 INJECTION, SOLUTION PERINEURAL ONCE
Status: COMPLETED | OUTPATIENT
Start: 2024-08-23 | End: 2024-08-23

## 2024-08-23 RX ORDER — HEPARIN SODIUM 5000 [USP'U]/ML
20000 INJECTION, SOLUTION INTRAVENOUS; SUBCUTANEOUS
Status: SHIPPED | OUTPATIENT
Start: 2024-08-23

## 2024-08-23 RX ORDER — INDOMETHACIN 25 MG/1
10 CAPSULE ORAL
Status: SHIPPED | OUTPATIENT
Start: 2024-08-23

## 2024-08-23 RX ADMIN — LIDOCAINE HYDROCHLORIDE 30 ML: 10 INJECTION, SOLUTION INFILTRATION; PERINEURAL at 11:08

## 2024-08-23 RX ADMIN — BUPIVACAINE HYDROCHLORIDE 125 MG: 5 INJECTION, SOLUTION PERINEURAL at 11:08

## 2024-08-23 NOTE — PATIENT INSTRUCTIONS
Pay attention to urine collection ( clean catch urine) and recommend to get urine culture for any suspected UTI ( even possible cath urine for culture).    Treat chronic constipation.    Increase water and empty the bladder more regularly.  Probiotics (Align) daily  D-Mannose 1 gram twice a day  Cranberry Pills / Juice  Estrace cream applied to the urethra as directed 2 to 3 x a week.    Empty the bladder before and after sexual intercourse.

## 2024-08-23 NOTE — H&P (VIEW-ONLY)
Subjective:       Patient ID: Chelsea Oconnell is a 43 y.o. female.    Chief Complaint: Follow-up (Pt states she is here for a follow up visit for interstitial cystitis. States she is having a lot of pain and discomfort. )      HPI: 42-year-old female patient presenting to clinic to establish care for incomplete emptying of the bladder.  She wants to discuss getting an SP tube placed.  The patient states that she would a liver resection and began experiencing urinary issues following that.  She is been seeing a urologist in [place] but would like to establish care here.  The patient has had 2 InterStim devices and currently self catheterizes 3 times a day.  She states that she is unable to urinate on her own and when she catheterizes she gets approximately 100-150 cc each time.  She denies the urge to urinate and states that she catheterizes when she feels lower abdominal pressure.    Chelsea Oconnell is a 41 y.o. female who recently underwent placement of bilateral InterStim leads and IPG's. Now, there is concern for infection involving her right-sided IPG and lead. She is presenting for surgical intervention.      Procedure(s) Performed: 1/10/2024  1.  Cystoscopy with hydrodistension   Findings:   Bladder capacity was 800 then 850    Following her recent cysto hydrodistention, she responded well for a few months.  Since May, 2024, she began having more problem with recurrent UTI, urinary incontinence and bladder pain.  Lately she has experienced more problem with recurrent UTI.  Her local physician treated her with abx but never did urine culture.  C/o worsening urinary incontinence with urgency, as well as coughing and sneezing.  hx of urinary retention however she is only retaining a small residual volume.  Has been on flomax.  She is self catheterizing up to 6 times a day in order to relieve the pressure and bladder pain.  She only able to get 100 cc of urine out each time per  catheterization..                      Past Medical History:   Past Medical History:   Diagnosis Date    Crohn's colitis     High cholesterol     Interstitial cystitis     Lupus (systemic lupus erythematosus)     Urinary tract infection        Past Surgical Historical:   Past Surgical History:   Procedure Laterality Date    bladder fistula removed       SECTION      CHOLECYSTECTOMY      CYSTOSCOPY WITH HYDRODISTENSION AND BIOPSY OF BLADDER N/A 2019    Procedure: CYSTOSCOPY, WITH BLADDER HYDRODISTENSION AND BIOPSY;  Surgeon: Donny Calle MD;  [facility];  Service: Urology;  Laterality: N/A;  1 hour    CYSTOSCOPY WITH HYDRODISTENSION OF BLADDER N/A 1/10/2024    Procedure: CYSTOSCOPY, WITH BLADDER HYDRODISTENSION;  Surgeon: Donny Calle MD;  [facility];  Service: Urology;  Laterality: N/A;  30 min    DILATION OF URETHRA N/A 2019    Procedure: DILATION, URETHRA;  Surgeon: Donny Calle MD;  Location: [facility] OR [address] [place] [address];  Service: Urology;  Laterality: N/A;    FLUOROSCOPIC URODYNAMIC STUDY N/A 2019    Procedure: URODYNAMIC STUDY, FLUOROSCOPIC;  Surgeon: Donny Calle MD;  Location: [facility] OR [address] [place] [address];  Service: Urology;  Laterality: N/A;  1 hour    FLUOROSCOPY N/A 2019    Procedure: FLUOROSCOPY;  Surgeon: Donny Calle MD;  [facility];  Service: Urology;  Laterality: N/A;    HEMORRHOID SURGERY      X2    HYSTERECTOMY      INSERTION OF SACRAL NEUROSTIMULATOR GENERATOR Left 2019    Procedure: INSERTION, PULSE GENERATOR, NEUROSTIMULATOR, SACRAL Stage2;  Surgeon: Donny Calle MD;  Location: [facility] OR [address] [place] [address];  Service: Urology;  Laterality: Left;    INSERTION OF SACRAL NEUROSTIMULATOR, ELECTRODES, AND IMPLANTABLE PULSE GENERATOR (IPG) Right 2019    Procedure: INSERTION, ELECTRODE LEAD, NEUROSTIMULATOR, SACRAL;  Surgeon: Donny Calle MD;  Location: [facility] OR [address] [place] [address];  Service: Urology;  Laterality: Right;    INSTILLATION OF  URINARY BLADDER N/A 2019    Procedure: INSTILLATION, BLADDER;  Surgeon: Donny Calle MD;  Location: [facility] OR 1ST FLR;  Service: Urology;  Laterality: N/A;    INSTILLATION OF URINARY BLADDER N/A 1/10/2024    Procedure: INSTILLATION, BLADDER;  Surgeon: Donny Calle MD;  Location: [facility] OR 1ST FLR;  Service: Urology;  Laterality: N/A;    liver      hemangioma removed    PERCUTANEOUS INSERTION OF SACRAL NERVE NEUROSTIMULATOR ELECTRODE LEAD Left 2022    Procedure: INSERTION, ELECTRODE LEAD, NEUROSTIMULATOR, SACRAL, PERCUTANEOUS INTERSTIM X;  Surgeon: Donny Calle MD;  Location: [facility] OR 2ND FLR;  Service: Urology;  Laterality: Left;  38026 code  08726 code  76724 code    REMOVAL OF ELECTRODE LEAD OF SACRAL NERVE STIMULATOR N/A 2022    Procedure: REMOVAL, ELECTRODE LEAD, SACRAL NERVE STIMULATOR AND INTERSTIM GENERATOR WOUNDED IRRIGATION;  Surgeon: Donny Calle MD;  Location: [facility] OR 2ND FLR;  Service: Urology;  Laterality: N/A;  45MIN    REVISION OF PROCEDURE INVOLVING SACRAL NEUROSTIMULATOR DEVICE Right 2022    Procedure: REVISION, NEUROSTIMULATOR, SACRAL;  Surgeon: Donny Calle MD;  Location: [facility] OR 2ND FLR;  Service: Urology;  Laterality: Right;  1.35  new wire  3058 old battery  19207 code  50831 code  66081 code    SURGICAL REMOVAL OF ENDOMETRIOSIS  2013,,,        Medications:   Medication List with Changes/Refills   New Medications    NITROFURANTOIN (MACRODANTIN) 100 MG CAPSULE    Take 1 capsule (100 mg total) by mouth once daily.   Current Medications    ALBUTEROL (PROVENTIL/VENTOLIN HFA) 90 MCG/ACTUATION INHALER    SMARTSI Puff(s) Via Inhaler 4 Times Daily PRN    ALBUTEROL-IPRATROPIUM (DUO-NEB) 2.5 MG-0.5 MG/3 ML NEBULIZER SOLUTION    Take by nebulization.    CATHETER 10 FR MISC    1 Units by Misc.(Non-Drug; Combo Route) route 6 (six) times daily.    DIAZEPAM (VALIUM) 10 MG TAB    Take 1 tablet (10 mg total) by mouth 2 (two) times a day.    ESTRADIOL  (VIVELLE-DOT) 0.1 MG/24 HR PTSW        FISH OIL 1,000 MG (120 MG-180 MG) CAP    Take 1 capsule by mouth.    GABAPENTIN (NEURONTIN) 300 MG CAPSULE    Take 1 capsule (300 mg total) by mouth 3 (three) times daily.    HYDROXYCHLOROQUINE (PLAQUENIL) 200 MG TABLET    Take 200 mg by mouth once daily. TAKES MID DAY    HYDROXYZINE HCL (ATARAX) 25 MG TABLET    Take 1 tablet (25 mg total) by mouth every evening.    MELOXICAM (MOBIC) 15 MG TABLET        MULTIVITAMIN-CA-IRON-MINERALS 27-0.4 MG TAB    Take by mouth once daily.    NALOXONE (NARCAN) 4 MG/ACTUATION SPRY        OXYCODONE (ROXICODONE) 5 MG IMMEDIATE RELEASE TABLET    Take 1 tablet (5 mg total) by mouth every 6 (six) hours as needed for Pain.    OXYCODONE-ACETAMINOPHEN (PERCOCET) 5-325 MG PER TABLET    Take 1 tablet by mouth every 4 (four) hours as needed for Pain.    PANTOPRAZOLE (PROTONIX) 40 MG TABLET        PEG-ELECTROLYTE SOLN (NULYTELY WITH FLAVOR PACKS) 420 GRAM SOLR        PENTOSAN POLYSULFATE (ELMIRON) 100 MG CAP    Take 1 capsule (100 mg total) by mouth 3 (three) times daily.    PHENAZOPYRIDINE (PYRIDIUM) 100 MG TABLET    Take 2 tablets (200 mg total) by mouth 3 (three) times daily as needed for Pain (bladder pain).    POTASSIUM CITRATE (UROCIT-K 10) 10 MEQ (1,080 MG) TBSR    Take 1 tablet (10 mEq total) by mouth 3 (three) times daily with meals.    PREGABALIN (LYRICA) 50 MG CAPSULE        ROSUVASTATIN (CRESTOR) 20 MG TABLET    Take 20 mg by mouth every evening.    SUCRALFATE (CARAFATE) 1 GRAM TABLET        TAMSULOSIN (FLOMAX) 0.4 MG CAP    Take 1 capsule (0.4 mg total) by mouth Daily.    TAMSULOSIN (FLOMAX) 0.4 MG CAP    Take 1 capsule (0.4 mg total) by mouth every evening.    VENLAFAXINE (EFFEXOR-XR) 75 MG 24 HR CAPSULE    Take by mouth every morning.    VITAMIN D3 10 MCG (400 UNIT) TABLET    Take 1 tablet by mouth.    VITAMIN E 100 UNIT CAPSULE    Take 100 Units by mouth.        Past Social History:   Social History     Socioeconomic History    Marital  "status:    Tobacco Use    Smoking status: Never    Smokeless tobacco: Never   Substance and Sexual Activity    Alcohol use: Never    Drug use: Yes     Types: Oxycodone, Hydrocodone    Sexual activity: Yes     Partners: Male       Allergies:   Review of patient's allergies indicates:   Allergen Reactions    Iodine and iodide containing products Rash and Swelling    Shellfish containing products Rash and Swelling    Sulfa (sulfonamide antibiotics) Rash and Swelling    Adhesive Hives     Had reaction to surgical tape after last procedure    Sertraline Hives    Xanax [alprazolam] Hives    Vancomycin analogues Itching     Pt had itching, redness to neck/face, and "feeling hot"         Family History:   Family History   Problem Relation Name Age of Onset    No Known Problems Father      Heart disease Mother          Review of Systems:  Review of Systems   Constitutional:  Negative for activity change, appetite change, chills, diaphoresis, fatigue, fever and unexpected weight change.   HENT:  Negative for congestion, dental problem, drooling, ear discharge, ear pain, facial swelling, hearing loss, mouth sores, nosebleeds, postnasal drip, rhinorrhea, sinus pressure, sinus pain, sneezing, sore throat, tinnitus, trouble swallowing and voice change.    Eyes:  Negative for photophobia, pain, discharge, redness, itching and visual disturbance.   Respiratory:  Negative for apnea, cough, choking, chest tightness, shortness of breath, wheezing and stridor.    Cardiovascular:  Negative for chest pain and leg swelling.   Gastrointestinal:  Negative for abdominal distention, abdominal pain, anal bleeding, blood in stool, constipation, diarrhea, nausea, rectal pain and vomiting.   Endocrine: Negative for cold intolerance, heat intolerance, polydipsia, polyphagia and polyuria.   Genitourinary:  Positive for difficulty urinating. Negative for decreased urine volume, dyspareunia, dysuria, enuresis, flank pain, frequency, genital " sores, hematuria, menstrual problem, pelvic pain, urgency, vaginal bleeding, vaginal discharge and vaginal pain.   Musculoskeletal:  Negative for arthralgias, back pain, gait problem, joint swelling, myalgias, neck pain and neck stiffness.   Skin:  Negative for color change, pallor, rash and wound.   Allergic/Immunologic: Negative for environmental allergies, food allergies and immunocompromised state.   Neurological:  Negative for dizziness, tremors, seizures, syncope, facial asymmetry, speech difficulty, weakness, light-headedness, numbness and headaches.   Hematological:  Negative for adenopathy. Does not bruise/bleed easily.   Psychiatric/Behavioral:  Negative for agitation, behavioral problems, confusion, decreased concentration, dysphoric mood, hallucinations, self-injury, sleep disturbance and suicidal ideas. The patient is not nervous/anxious and is not hyperactive.        Physical Exam:  Physical Exam  Exam conducted with a chaperone present.   Constitutional:       Appearance: Normal appearance.   HENT:      Head: Normocephalic.      Nose: Nose normal.      Mouth/Throat:      Mouth: Mucous membranes are moist.      Pharynx: Oropharynx is clear.   Eyes:      Extraocular Movements: Extraocular movements intact.      Conjunctiva/sclera: Conjunctivae normal.      Pupils: Pupils are equal, round, and reactive to light.   Cardiovascular:      Rate and Rhythm: Normal rate and regular rhythm.      Pulses: Normal pulses.      Heart sounds: Normal heart sounds.   Pulmonary:      Effort: Pulmonary effort is normal.      Breath sounds: Normal breath sounds.   Abdominal:      General: Abdomen is flat. Bowel sounds are normal.      Palpations: Abdomen is soft.      Hernia: There is no hernia in the left inguinal area or right inguinal area.   Genitourinary:     General: Normal vulva.      Pubic Area: No rash or pubic lice.       Labia:         Right: No rash, tenderness, lesion or injury.         Left: No rash,  tenderness, lesion or injury.       Urethra: No prolapse, urethral pain, urethral swelling or urethral lesion.      Rectum: Normal.   Musculoskeletal:         General: Normal range of motion.      Cervical back: Normal range of motion and neck supple.   Lymphadenopathy:      Lower Body: No right inguinal adenopathy. No left inguinal adenopathy.   Skin:     General: Skin is warm and dry.      Capillary Refill: Capillary refill takes less than 2 seconds.   Neurological:      General: No focal deficit present.      Mental Status: She is alert and oriented to person, place, and time. Mental status is at baseline.   Psychiatric:         Mood and Affect: Mood normal.         Behavior: Behavior normal.         Thought Content: Thought content normal.         Judgment: Judgment normal.       Procedure:  Bladder Instillation Procedure:  A 16 F Gomez catheter was placed and the bladder was drained without problems.    Intravesical cocktail mixture treatment is given in a standard fashion.  The solution of 20,000 unit heparin, 25 ml 0.5 % Marcaine, 20 ml 1% lidocaine, and 10 ml 8.4% sodium bicarbonate was instilled in the bladder, and the catheter was removed. The patient was instructed to hold the mixture solution in the bladder for 20 to 30 minutes and to void as instructed.    Patient tolerated the procedure well.     UA: nitrite positive today. ( Voided sample)  Cath sample sent for culture.      Assessment/Plan:       Neurostimulator device in situ  -     X-Ray Sacrum And Coccyx; Future; Expected date: 08/23/2024  -     X-Ray Pelvis Routine AP; Future; Expected date: 08/23/2024    Recurrent UTI  -     nitrofurantoin (MACRODANTIN) 100 MG capsule; Take 1 capsule (100 mg total) by mouth once daily.  Dispense: 100 capsule; Refill: 0  -     Urine culture  -     heparin (porcine) injection 20,000 Units  -     LIDOcaine HCL 10 mg/ml (1%) injection 30 mL  -     BUPivacaine injection 125 mg  -     sodium bicarbonate 4.2% 20  mL    Mixed incontinence urge and stress    Chronic bladder pain  -     heparin (porcine) injection 20,000 Units  -     sodium bicarbonate solution 10 mEq    Overactive bladder    IC (interstitial cystitis)  -     heparin (porcine) injection 20,000 Units  -     sodium bicarbonate solution 10 mEq      Pay attention to urine collection ( clean catch urine) and recommend to get urine culture for any suspected UTI ( even possible cath urine for culture).    Treat chronic constipation.    Increase water and empty the bladder more regularly.  Probiotics (Align) daily  D-Mannose 1 gram twice a day  Cranberry Pills / Juice  Estrace cream applied to the urethra as directed 2 to 3 x a week.    Empty the bladder before and after sexual intercourse.         Continue IC smart diet, drinking alkaline water and IC meds including Elmiron, Neurontin, and hydroxyzine 25 mg q hs.  Use pyridium for bladder pain.    Continue flomax.  S/p InterStim Therapy.  Will start her on suppressive abx with daily macrodantin.  May consider bilateral InterStim Therapy if desires to see whether it will help her to urinate easier.    Bladder instillation given today as above.    I spent 40 minutes with the patient of which more than half was spent in direct consultation with the patient in regards to our treatment and plan.     Follow up in about 17 days (around 9/9/2024), or cysto botox injection 100 units, hydrodistention, bladder instillation.

## 2024-08-23 NOTE — PROGRESS NOTES
Subjective:       Patient ID: Chelsea Oconnell is a 43 y.o. female.    Chief Complaint: Follow-up (Pt states she is here for a follow up visit for interstitial cystitis. States she is having a lot of pain and discomfort. )      HPI: 42-year-old female patient presenting to clinic to establish care for incomplete emptying of the bladder.  She wants to discuss getting an SP tube placed.  The patient states that she would a liver resection and began experiencing urinary issues following that.  She is been seeing a urologist in [place] but would like to establish care here.  The patient has had 2 InterStim devices and currently self catheterizes 3 times a day.  She states that she is unable to urinate on her own and when she catheterizes she gets approximately 100-150 cc each time.  She denies the urge to urinate and states that she catheterizes when she feels lower abdominal pressure.    Chelsea Oconnell is a 41 y.o. female who recently underwent placement of bilateral InterStim leads and IPG's. Now, there is concern for infection involving her right-sided IPG and lead. She is presenting for surgical intervention.      Procedure(s) Performed: 1/10/2024  1.  Cystoscopy with hydrodistension   Findings:   Bladder capacity was 800 then 850    Following her recent cysto hydrodistention, she responded well for a few months.  Since May, 2024, she began having more problem with recurrent UTI, urinary incontinence and bladder pain.  Lately she has experienced more problem with recurrent UTI.  Her local physician treated her with abx but never did urine culture.  C/o worsening urinary incontinence with urgency, as well as coughing and sneezing.  hx of urinary retention however she is only retaining a small residual volume.  Has been on flomax.  She is self catheterizing up to 6 times a day in order to relieve the pressure and bladder pain.  She only able to get 100 cc of urine out each time per  catheterization..                      Past Medical History:   Past Medical History:   Diagnosis Date    Crohn's colitis     High cholesterol     Interstitial cystitis     Lupus (systemic lupus erythematosus)     Urinary tract infection        Past Surgical Historical:   Past Surgical History:   Procedure Laterality Date    bladder fistula removed       SECTION      CHOLECYSTECTOMY      CYSTOSCOPY WITH HYDRODISTENSION AND BIOPSY OF BLADDER N/A 2019    Procedure: CYSTOSCOPY, WITH BLADDER HYDRODISTENSION AND BIOPSY;  Surgeon: Donny Calle MD;  [facility];  Service: Urology;  Laterality: N/A;  1 hour    CYSTOSCOPY WITH HYDRODISTENSION OF BLADDER N/A 1/10/2024    Procedure: CYSTOSCOPY, WITH BLADDER HYDRODISTENSION;  Surgeon: Donny Calle MD;  [facility];  Service: Urology;  Laterality: N/A;  30 min    DILATION OF URETHRA N/A 2019    Procedure: DILATION, URETHRA;  Surgeon: Donny Calle MD;  Location: [facility] OR [address] [place] [address];  Service: Urology;  Laterality: N/A;    FLUOROSCOPIC URODYNAMIC STUDY N/A 2019    Procedure: URODYNAMIC STUDY, FLUOROSCOPIC;  Surgeon: Donny Calle MD;  Location: [facility] OR [address] [place] [address];  Service: Urology;  Laterality: N/A;  1 hour    FLUOROSCOPY N/A 2019    Procedure: FLUOROSCOPY;  Surgeon: Donny Calle MD;  [facility];  Service: Urology;  Laterality: N/A;    HEMORRHOID SURGERY      X2    HYSTERECTOMY      INSERTION OF SACRAL NEUROSTIMULATOR GENERATOR Left 2019    Procedure: INSERTION, PULSE GENERATOR, NEUROSTIMULATOR, SACRAL Stage2;  Surgeon: Donny Calle MD;  Location: [facility] OR [address] [place] [address];  Service: Urology;  Laterality: Left;    INSERTION OF SACRAL NEUROSTIMULATOR, ELECTRODES, AND IMPLANTABLE PULSE GENERATOR (IPG) Right 2019    Procedure: INSERTION, ELECTRODE LEAD, NEUROSTIMULATOR, SACRAL;  Surgeon: Donny Calle MD;  Location: [facility] OR [address] [place] [address];  Service: Urology;  Laterality: Right;    INSTILLATION OF  URINARY BLADDER N/A 2019    Procedure: INSTILLATION, BLADDER;  Surgeon: Donny Calle MD;  Location: [facility] OR 1ST FLR;  Service: Urology;  Laterality: N/A;    INSTILLATION OF URINARY BLADDER N/A 1/10/2024    Procedure: INSTILLATION, BLADDER;  Surgeon: Donny Calle MD;  Location: [facility] OR 1ST FLR;  Service: Urology;  Laterality: N/A;    liver      hemangioma removed    PERCUTANEOUS INSERTION OF SACRAL NERVE NEUROSTIMULATOR ELECTRODE LEAD Left 2022    Procedure: INSERTION, ELECTRODE LEAD, NEUROSTIMULATOR, SACRAL, PERCUTANEOUS INTERSTIM X;  Surgeon: Donny Calle MD;  Location: [facility] OR 2ND FLR;  Service: Urology;  Laterality: Left;  43947 code  49140 code  77341 code    REMOVAL OF ELECTRODE LEAD OF SACRAL NERVE STIMULATOR N/A 2022    Procedure: REMOVAL, ELECTRODE LEAD, SACRAL NERVE STIMULATOR AND INTERSTIM GENERATOR WOUNDED IRRIGATION;  Surgeon: Donny Calle MD;  Location: [facility] OR 2ND FLR;  Service: Urology;  Laterality: N/A;  45MIN    REVISION OF PROCEDURE INVOLVING SACRAL NEUROSTIMULATOR DEVICE Right 2022    Procedure: REVISION, NEUROSTIMULATOR, SACRAL;  Surgeon: Donny Calle MD;  Location: [facility] OR 2ND FLR;  Service: Urology;  Laterality: Right;  1.35  new wire  3058 old battery  55609 code  40009 code  61483 code    SURGICAL REMOVAL OF ENDOMETRIOSIS  2013,,,        Medications:   Medication List with Changes/Refills   New Medications    NITROFURANTOIN (MACRODANTIN) 100 MG CAPSULE    Take 1 capsule (100 mg total) by mouth once daily.   Current Medications    ALBUTEROL (PROVENTIL/VENTOLIN HFA) 90 MCG/ACTUATION INHALER    SMARTSI Puff(s) Via Inhaler 4 Times Daily PRN    ALBUTEROL-IPRATROPIUM (DUO-NEB) 2.5 MG-0.5 MG/3 ML NEBULIZER SOLUTION    Take by nebulization.    CATHETER 10 FR MISC    1 Units by Misc.(Non-Drug; Combo Route) route 6 (six) times daily.    DIAZEPAM (VALIUM) 10 MG TAB    Take 1 tablet (10 mg total) by mouth 2 (two) times a day.    ESTRADIOL  (VIVELLE-DOT) 0.1 MG/24 HR PTSW        FISH OIL 1,000 MG (120 MG-180 MG) CAP    Take 1 capsule by mouth.    GABAPENTIN (NEURONTIN) 300 MG CAPSULE    Take 1 capsule (300 mg total) by mouth 3 (three) times daily.    HYDROXYCHLOROQUINE (PLAQUENIL) 200 MG TABLET    Take 200 mg by mouth once daily. TAKES MID DAY    HYDROXYZINE HCL (ATARAX) 25 MG TABLET    Take 1 tablet (25 mg total) by mouth every evening.    MELOXICAM (MOBIC) 15 MG TABLET        MULTIVITAMIN-CA-IRON-MINERALS 27-0.4 MG TAB    Take by mouth once daily.    NALOXONE (NARCAN) 4 MG/ACTUATION SPRY        OXYCODONE (ROXICODONE) 5 MG IMMEDIATE RELEASE TABLET    Take 1 tablet (5 mg total) by mouth every 6 (six) hours as needed for Pain.    OXYCODONE-ACETAMINOPHEN (PERCOCET) 5-325 MG PER TABLET    Take 1 tablet by mouth every 4 (four) hours as needed for Pain.    PANTOPRAZOLE (PROTONIX) 40 MG TABLET        PEG-ELECTROLYTE SOLN (NULYTELY WITH FLAVOR PACKS) 420 GRAM SOLR        PENTOSAN POLYSULFATE (ELMIRON) 100 MG CAP    Take 1 capsule (100 mg total) by mouth 3 (three) times daily.    PHENAZOPYRIDINE (PYRIDIUM) 100 MG TABLET    Take 2 tablets (200 mg total) by mouth 3 (three) times daily as needed for Pain (bladder pain).    POTASSIUM CITRATE (UROCIT-K 10) 10 MEQ (1,080 MG) TBSR    Take 1 tablet (10 mEq total) by mouth 3 (three) times daily with meals.    PREGABALIN (LYRICA) 50 MG CAPSULE        ROSUVASTATIN (CRESTOR) 20 MG TABLET    Take 20 mg by mouth every evening.    SUCRALFATE (CARAFATE) 1 GRAM TABLET        TAMSULOSIN (FLOMAX) 0.4 MG CAP    Take 1 capsule (0.4 mg total) by mouth Daily.    TAMSULOSIN (FLOMAX) 0.4 MG CAP    Take 1 capsule (0.4 mg total) by mouth every evening.    VENLAFAXINE (EFFEXOR-XR) 75 MG 24 HR CAPSULE    Take by mouth every morning.    VITAMIN D3 10 MCG (400 UNIT) TABLET    Take 1 tablet by mouth.    VITAMIN E 100 UNIT CAPSULE    Take 100 Units by mouth.        Past Social History:   Social History     Socioeconomic History    Marital  "status:    Tobacco Use    Smoking status: Never    Smokeless tobacco: Never   Substance and Sexual Activity    Alcohol use: Never    Drug use: Yes     Types: Oxycodone, Hydrocodone    Sexual activity: Yes     Partners: Male       Allergies:   Review of patient's allergies indicates:   Allergen Reactions    Iodine and iodide containing products Rash and Swelling    Shellfish containing products Rash and Swelling    Sulfa (sulfonamide antibiotics) Rash and Swelling    Adhesive Hives     Had reaction to surgical tape after last procedure    Sertraline Hives    Xanax [alprazolam] Hives    Vancomycin analogues Itching     Pt had itching, redness to neck/face, and "feeling hot"         Family History:   Family History   Problem Relation Name Age of Onset    No Known Problems Father      Heart disease Mother          Review of Systems:  Review of Systems   Constitutional:  Negative for activity change, appetite change, chills, diaphoresis, fatigue, fever and unexpected weight change.   HENT:  Negative for congestion, dental problem, drooling, ear discharge, ear pain, facial swelling, hearing loss, mouth sores, nosebleeds, postnasal drip, rhinorrhea, sinus pressure, sinus pain, sneezing, sore throat, tinnitus, trouble swallowing and voice change.    Eyes:  Negative for photophobia, pain, discharge, redness, itching and visual disturbance.   Respiratory:  Negative for apnea, cough, choking, chest tightness, shortness of breath, wheezing and stridor.    Cardiovascular:  Negative for chest pain and leg swelling.   Gastrointestinal:  Negative for abdominal distention, abdominal pain, anal bleeding, blood in stool, constipation, diarrhea, nausea, rectal pain and vomiting.   Endocrine: Negative for cold intolerance, heat intolerance, polydipsia, polyphagia and polyuria.   Genitourinary:  Positive for difficulty urinating. Negative for decreased urine volume, dyspareunia, dysuria, enuresis, flank pain, frequency, genital " sores, hematuria, menstrual problem, pelvic pain, urgency, vaginal bleeding, vaginal discharge and vaginal pain.   Musculoskeletal:  Negative for arthralgias, back pain, gait problem, joint swelling, myalgias, neck pain and neck stiffness.   Skin:  Negative for color change, pallor, rash and wound.   Allergic/Immunologic: Negative for environmental allergies, food allergies and immunocompromised state.   Neurological:  Negative for dizziness, tremors, seizures, syncope, facial asymmetry, speech difficulty, weakness, light-headedness, numbness and headaches.   Hematological:  Negative for adenopathy. Does not bruise/bleed easily.   Psychiatric/Behavioral:  Negative for agitation, behavioral problems, confusion, decreased concentration, dysphoric mood, hallucinations, self-injury, sleep disturbance and suicidal ideas. The patient is not nervous/anxious and is not hyperactive.        Physical Exam:  Physical Exam  Exam conducted with a chaperone present.   Constitutional:       Appearance: Normal appearance.   HENT:      Head: Normocephalic.      Nose: Nose normal.      Mouth/Throat:      Mouth: Mucous membranes are moist.      Pharynx: Oropharynx is clear.   Eyes:      Extraocular Movements: Extraocular movements intact.      Conjunctiva/sclera: Conjunctivae normal.      Pupils: Pupils are equal, round, and reactive to light.   Cardiovascular:      Rate and Rhythm: Normal rate and regular rhythm.      Pulses: Normal pulses.      Heart sounds: Normal heart sounds.   Pulmonary:      Effort: Pulmonary effort is normal.      Breath sounds: Normal breath sounds.   Abdominal:      General: Abdomen is flat. Bowel sounds are normal.      Palpations: Abdomen is soft.      Hernia: There is no hernia in the left inguinal area or right inguinal area.   Genitourinary:     General: Normal vulva.      Pubic Area: No rash or pubic lice.       Labia:         Right: No rash, tenderness, lesion or injury.         Left: No rash,  tenderness, lesion or injury.       Urethra: No prolapse, urethral pain, urethral swelling or urethral lesion.      Rectum: Normal.   Musculoskeletal:         General: Normal range of motion.      Cervical back: Normal range of motion and neck supple.   Lymphadenopathy:      Lower Body: No right inguinal adenopathy. No left inguinal adenopathy.   Skin:     General: Skin is warm and dry.      Capillary Refill: Capillary refill takes less than 2 seconds.   Neurological:      General: No focal deficit present.      Mental Status: She is alert and oriented to person, place, and time. Mental status is at baseline.   Psychiatric:         Mood and Affect: Mood normal.         Behavior: Behavior normal.         Thought Content: Thought content normal.         Judgment: Judgment normal.       Procedure:  Bladder Instillation Procedure:  A 16 F Gomez catheter was placed and the bladder was drained without problems.    Intravesical cocktail mixture treatment is given in a standard fashion.  The solution of 20,000 unit heparin, 25 ml 0.5 % Marcaine, 20 ml 1% lidocaine, and 10 ml 8.4% sodium bicarbonate was instilled in the bladder, and the catheter was removed. The patient was instructed to hold the mixture solution in the bladder for 20 to 30 minutes and to void as instructed.    Patient tolerated the procedure well.     UA: nitrite positive today. ( Voided sample)  Cath sample sent for culture.      Assessment/Plan:       Neurostimulator device in situ  -     X-Ray Sacrum And Coccyx; Future; Expected date: 08/23/2024  -     X-Ray Pelvis Routine AP; Future; Expected date: 08/23/2024    Recurrent UTI  -     nitrofurantoin (MACRODANTIN) 100 MG capsule; Take 1 capsule (100 mg total) by mouth once daily.  Dispense: 100 capsule; Refill: 0  -     Urine culture  -     heparin (porcine) injection 20,000 Units  -     LIDOcaine HCL 10 mg/ml (1%) injection 30 mL  -     BUPivacaine injection 125 mg  -     sodium bicarbonate 4.2% 20  mL    Mixed incontinence urge and stress    Chronic bladder pain  -     heparin (porcine) injection 20,000 Units  -     sodium bicarbonate solution 10 mEq    Overactive bladder    IC (interstitial cystitis)  -     heparin (porcine) injection 20,000 Units  -     sodium bicarbonate solution 10 mEq      Pay attention to urine collection ( clean catch urine) and recommend to get urine culture for any suspected UTI ( even possible cath urine for culture).    Treat chronic constipation.    Increase water and empty the bladder more regularly.  Probiotics (Align) daily  D-Mannose 1 gram twice a day  Cranberry Pills / Juice  Estrace cream applied to the urethra as directed 2 to 3 x a week.    Empty the bladder before and after sexual intercourse.         Continue IC smart diet, drinking alkaline water and IC meds including Elmiron, Neurontin, and hydroxyzine 25 mg q hs.  Use pyridium for bladder pain.    Continue flomax.  S/p InterStim Therapy.  Will start her on suppressive abx with daily macrodantin.  May consider bilateral InterStim Therapy if desires to see whether it will help her to urinate easier.    Bladder instillation given today as above.    I spent 40 minutes with the patient of which more than half was spent in direct consultation with the patient in regards to our treatment and plan.     Follow up in about 17 days (around 9/9/2024), or cysto botox injection 100 units, hydrodistention, bladder instillation.

## 2024-08-23 NOTE — TELEPHONE ENCOUNTER
Overactive bladder  -     Case Request Operating Room: CYSTOSCOPY,WITH BOTULINUM TOXIN INJECTION, HYDRODISTENTION, CYSTOSCOPY    Mixed incontinence urge and stress  -     Case Request Operating Room: CYSTOSCOPY,WITH BOTULINUM TOXIN INJECTION, HYDRODISTENTION, CYSTOSCOPY    IC (interstitial cystitis)  -     Case Request Operating Room: CYSTOSCOPY,WITH BOTULINUM TOXIN INJECTION, HYDRODISTENTION, CYSTOSCOPY    Bladder pain  -     Case Request Operating Room: CYSTOSCOPY,WITH BOTULINUM TOXIN INJECTION, HYDRODISTENTION, CYSTOSCOPY

## 2024-08-24 LAB — BACTERIA UR CULT: NO GROWTH

## 2024-09-06 NOTE — PRE-PROCEDURE INSTRUCTIONS
Patient was informed of pre-procedure instructions and arrival time. Pt verbalized understanding and is to be accompanied by daughter.    Patient denies taking any over-the-counter vitamins, supplements, nsaids or aspirin products for 7 days.    Dear Chelsea ,     You are scheduled for a procedure with Dr. Calle on 9/9/2024. Your scheduled arrival time is 5:15 am.  This arrival time is roughly 2 hours before your anticipated procedure time to allow sufficient time for pre-op.  Please wear comfortable clothing  This procedure will take place at the [facility] at the corner of [facility].  It is in the [place] next to [facility]. The address is:     [address] [place] [address].  [place] [address]     After entering the building, proceed to the second floor and check in with registration.      Your fasting instructions are as follows:     Nothing to eat after 11:00 pm the evening before your surgery.   You may drink clear liquids up until 2 hours prior to your arrival time.      You MUST have a responsible adult to bring you home. Your surgery will be cancelled if you do not have a ride. ([employer] AND [employer] WILL NOT BE ACCEPTED AS A RESPONSIBLE RIDE)     Please hold the following medications the morning of your procedure:  -Aspirin and Aspirin-containing products (Goody's powder, Excedrin)  -NSAIDs (Advil, Ibuprofen, Aleve, Diclofenac)  -Vitamins/Supplements   -Herbal remedies/Teas  -Stimulants (Adderall, Vyvanse, Adipex)  -Diabetic medication   -Prescription creams/gels/lotions        *May take Tylenol if needed         The evening before and morning of your procedure, take a shower using antibacterial soap (ex: Hibiclens or Dial antibacterial soap). DO NOT apply deodorant, lotion, cologne, or anything else to the skin. Do not wear jewelry or bring any valuables with you.  .     Please do not wear contact lenses the day of your procedure.   Bring any inhalers that you  may need.     If you have any procedure-specific questions, please call your surgeon's office. Any other questions, don't hesitate to call at [phone]     Thanks,  Pre-Admit Testing  Anesthesia Dept [facility]     This MyChart message has not been read.

## 2024-09-09 ENCOUNTER — ANESTHESIA (OUTPATIENT)
Dept: SURGERY | Facility: HOSPITAL | Age: 44
End: 2024-09-09
Payer: COMMERCIAL

## 2024-09-09 ENCOUNTER — HOSPITAL ENCOUNTER (OUTPATIENT)
Facility: HOSPITAL | Age: 44
Discharge: HOME | End: 2024-09-09
Attending: UROLOGY | Admitting: UROLOGY
Payer: COMMERCIAL

## 2024-09-09 ENCOUNTER — ANESTHESIA EVENT (OUTPATIENT)
Dept: SURGERY | Facility: HOSPITAL | Age: 44
End: 2024-09-09
Payer: COMMERCIAL

## 2024-09-09 VITALS
OXYGEN SATURATION: 98 % | RESPIRATION RATE: 14 BRPM | WEIGHT: 160 LBS | TEMPERATURE: 98 F | SYSTOLIC BLOOD PRESSURE: 111 MMHG | HEART RATE: 82 BPM | DIASTOLIC BLOOD PRESSURE: 59 MMHG | HEIGHT: 62 IN | BODY MASS INDEX: 29.44 KG/M2

## 2024-09-09 DIAGNOSIS — N32.81 OAB (OVERACTIVE BLADDER): ICD-10-CM

## 2024-09-09 DIAGNOSIS — R10.2 PELVIC PAIN IN FEMALE: ICD-10-CM

## 2024-09-09 DIAGNOSIS — N30.10 IC (INTERSTITIAL CYSTITIS): Primary | ICD-10-CM

## 2024-09-09 DIAGNOSIS — R10.2 PELVIC PAIN: Primary | ICD-10-CM

## 2024-09-09 PROCEDURE — D9220A PRA ANESTHESIA: Mod: ,,, | Performed by: NURSE ANESTHETIST, CERTIFIED REGISTERED

## 2024-09-09 PROCEDURE — 25000003 PHARM REV CODE 250: Performed by: ANESTHESIOLOGY

## 2024-09-09 PROCEDURE — 36000706: Performed by: UROLOGY

## 2024-09-09 PROCEDURE — 25000003 PHARM REV CODE 250: Performed by: UROLOGY

## 2024-09-09 PROCEDURE — 99900035 HC TECH TIME PER 15 MIN (STAT)

## 2024-09-09 PROCEDURE — 63600175 PHARM REV CODE 636 W HCPCS: Performed by: ANESTHESIOLOGY

## 2024-09-09 PROCEDURE — 71000039 HC RECOVERY, EACH ADD'L HOUR: Performed by: UROLOGY

## 2024-09-09 PROCEDURE — 36000707: Performed by: UROLOGY

## 2024-09-09 PROCEDURE — 52287 CYSTOSCOPY CHEMODENERVATION: CPT | Mod: 51,,, | Performed by: UROLOGY

## 2024-09-09 PROCEDURE — 52260 CYSTOSCOPY AND TREATMENT: CPT | Mod: ,,, | Performed by: UROLOGY

## 2024-09-09 PROCEDURE — 37000008 HC ANESTHESIA 1ST 15 MINUTES: Performed by: UROLOGY

## 2024-09-09 PROCEDURE — 25000003 PHARM REV CODE 250: Performed by: NURSE ANESTHETIST, CERTIFIED REGISTERED

## 2024-09-09 PROCEDURE — 71000033 HC RECOVERY, INTIAL HOUR: Performed by: UROLOGY

## 2024-09-09 PROCEDURE — 94761 N-INVAS EAR/PLS OXIMETRY MLT: CPT

## 2024-09-09 PROCEDURE — 37000009 HC ANESTHESIA EA ADD 15 MINS: Performed by: UROLOGY

## 2024-09-09 PROCEDURE — 71000016 HC POSTOP RECOV ADDL HR: Performed by: UROLOGY

## 2024-09-09 PROCEDURE — 63600175 PHARM REV CODE 636 W HCPCS: Performed by: NURSE ANESTHETIST, CERTIFIED REGISTERED

## 2024-09-09 PROCEDURE — 51700 IRRIGATION OF BLADDER: CPT | Mod: 51,,, | Performed by: UROLOGY

## 2024-09-09 PROCEDURE — 71000015 HC POSTOP RECOV 1ST HR: Performed by: UROLOGY

## 2024-09-09 PROCEDURE — 63600175 PHARM REV CODE 636 W HCPCS: Mod: JZ,JG | Performed by: UROLOGY

## 2024-09-09 RX ORDER — ONDANSETRON HYDROCHLORIDE 2 MG/ML
4 INJECTION, SOLUTION INTRAVENOUS DAILY PRN
Status: DISCONTINUED | OUTPATIENT
Start: 2024-09-09 | End: 2024-09-09 | Stop reason: HOSPADM

## 2024-09-09 RX ORDER — HEPARIN SODIUM 1000 [USP'U]/ML
INJECTION, SOLUTION INTRAVENOUS; SUBCUTANEOUS
Status: DISCONTINUED | OUTPATIENT
Start: 2024-09-09 | End: 2024-09-09 | Stop reason: HOSPADM

## 2024-09-09 RX ORDER — GLUCAGON 1 MG
1 KIT INJECTION
Status: DISCONTINUED | OUTPATIENT
Start: 2024-09-09 | End: 2024-09-09 | Stop reason: HOSPADM

## 2024-09-09 RX ORDER — FENTANYL CITRATE 50 UG/ML
25 INJECTION, SOLUTION INTRAMUSCULAR; INTRAVENOUS EVERY 5 MIN PRN
Status: DISCONTINUED | OUTPATIENT
Start: 2024-09-09 | End: 2024-09-09 | Stop reason: HOSPADM

## 2024-09-09 RX ORDER — OXYCODONE AND ACETAMINOPHEN 5; 325 MG/1; MG/1
1 TABLET ORAL EVERY 4 HOURS PRN
Qty: 20 TABLET | Refills: 0 | Status: SHIPPED | OUTPATIENT
Start: 2024-09-09

## 2024-09-09 RX ORDER — MIDAZOLAM HYDROCHLORIDE 1 MG/ML
INJECTION INTRAMUSCULAR; INTRAVENOUS
Status: DISCONTINUED | OUTPATIENT
Start: 2024-09-09 | End: 2024-09-09

## 2024-09-09 RX ORDER — OXYCODONE AND ACETAMINOPHEN 5; 325 MG/1; MG/1
1 TABLET ORAL
Status: DISCONTINUED | OUTPATIENT
Start: 2024-09-09 | End: 2024-09-09 | Stop reason: HOSPADM

## 2024-09-09 RX ORDER — DEXAMETHASONE SODIUM PHOSPHATE 4 MG/ML
INJECTION, SOLUTION INTRA-ARTICULAR; INTRALESIONAL; INTRAMUSCULAR; INTRAVENOUS; SOFT TISSUE
Status: DISCONTINUED | OUTPATIENT
Start: 2024-09-09 | End: 2024-09-09

## 2024-09-09 RX ORDER — PHENAZOPYRIDINE HYDROCHLORIDE 200 MG/1
200 TABLET, FILM COATED ORAL 3 TIMES DAILY PRN
Qty: 21 TABLET | Refills: 0 | Status: SHIPPED | OUTPATIENT
Start: 2024-09-09 | End: 2024-09-16

## 2024-09-09 RX ORDER — PROCHLORPERAZINE EDISYLATE 5 MG/ML
5 INJECTION INTRAMUSCULAR; INTRAVENOUS EVERY 30 MIN PRN
Status: DISCONTINUED | OUTPATIENT
Start: 2024-09-09 | End: 2024-09-09 | Stop reason: HOSPADM

## 2024-09-09 RX ORDER — LIDOCAINE HYDROCHLORIDE 10 MG/ML
INJECTION, SOLUTION INFILTRATION; PERINEURAL
Status: DISCONTINUED | OUTPATIENT
Start: 2024-09-09 | End: 2024-09-09 | Stop reason: HOSPADM

## 2024-09-09 RX ORDER — INDOMETHACIN 25 MG/1
CAPSULE ORAL
Status: DISCONTINUED | OUTPATIENT
Start: 2024-09-09 | End: 2024-09-09 | Stop reason: HOSPADM

## 2024-09-09 RX ORDER — AMOXICILLIN AND CLAVULANATE POTASSIUM 875; 125 MG/1; MG/1
1 TABLET, FILM COATED ORAL EVERY 12 HOURS
Qty: 6 TABLET | Refills: 0 | Status: SHIPPED | OUTPATIENT
Start: 2024-09-09 | End: 2024-09-12

## 2024-09-09 RX ORDER — TRAMADOL HYDROCHLORIDE 50 MG/1
50 TABLET ORAL EVERY 6 HOURS
Qty: 10 TABLET | Refills: 0 | Status: SHIPPED | OUTPATIENT
Start: 2024-09-09 | End: 2024-09-12

## 2024-09-09 RX ORDER — KETOROLAC TROMETHAMINE 30 MG/ML
INJECTION, SOLUTION INTRAMUSCULAR; INTRAVENOUS
Status: DISCONTINUED | OUTPATIENT
Start: 2024-09-09 | End: 2024-09-09

## 2024-09-09 RX ORDER — BUPIVACAINE HYDROCHLORIDE 2.5 MG/ML
INJECTION, SOLUTION EPIDURAL; INFILTRATION; INTRACAUDAL
Status: DISCONTINUED | OUTPATIENT
Start: 2024-09-09 | End: 2024-09-09 | Stop reason: HOSPADM

## 2024-09-09 RX ORDER — PROPOFOL 10 MG/ML
VIAL (ML) INTRAVENOUS
Status: DISCONTINUED | OUTPATIENT
Start: 2024-09-09 | End: 2024-09-09

## 2024-09-09 RX ORDER — DULOXETIN HYDROCHLORIDE 30 MG/1
30 CAPSULE, DELAYED RELEASE ORAL DAILY
Qty: 30 CAPSULE | Refills: 1 | Status: SHIPPED | OUTPATIENT
Start: 2024-09-09 | End: 2025-09-09

## 2024-09-09 RX ORDER — FENTANYL CITRATE 50 UG/ML
INJECTION, SOLUTION INTRAMUSCULAR; INTRAVENOUS
Status: DISCONTINUED | OUTPATIENT
Start: 2024-09-09 | End: 2024-09-09

## 2024-09-09 RX ORDER — LIDOCAINE HYDROCHLORIDE 20 MG/ML
INJECTION INTRAVENOUS
Status: DISCONTINUED | OUTPATIENT
Start: 2024-09-09 | End: 2024-09-09

## 2024-09-09 RX ORDER — HYDROMORPHONE HYDROCHLORIDE 1 MG/ML
0.2 INJECTION, SOLUTION INTRAMUSCULAR; INTRAVENOUS; SUBCUTANEOUS EVERY 5 MIN PRN
Status: DISCONTINUED | OUTPATIENT
Start: 2024-09-09 | End: 2024-09-09 | Stop reason: HOSPADM

## 2024-09-09 RX ORDER — SODIUM CHLORIDE 9 MG/ML
INJECTION, SOLUTION INTRAVENOUS CONTINUOUS
Status: DISCONTINUED | OUTPATIENT
Start: 2024-09-09 | End: 2024-09-09 | Stop reason: HOSPADM

## 2024-09-09 RX ORDER — ONDANSETRON HYDROCHLORIDE 2 MG/ML
INJECTION, SOLUTION INTRAVENOUS
Status: DISCONTINUED | OUTPATIENT
Start: 2024-09-09 | End: 2024-09-09

## 2024-09-09 RX ORDER — ACETAMINOPHEN 10 MG/ML
INJECTION, SOLUTION INTRAVENOUS
Status: DISCONTINUED | OUTPATIENT
Start: 2024-09-09 | End: 2024-09-09

## 2024-09-09 RX ORDER — CEFAZOLIN SODIUM 1 G/3ML
INJECTION, POWDER, FOR SOLUTION INTRAMUSCULAR; INTRAVENOUS
Status: DISCONTINUED | OUTPATIENT
Start: 2024-09-09 | End: 2024-09-09

## 2024-09-09 RX ADMIN — MIDAZOLAM HYDROCHLORIDE 2 MG: 1 INJECTION, SOLUTION INTRAMUSCULAR; INTRAVENOUS at 06:09

## 2024-09-09 RX ADMIN — OXYCODONE HYDROCHLORIDE AND ACETAMINOPHEN 1 TABLET: 5; 325 TABLET ORAL at 08:09

## 2024-09-09 RX ADMIN — FENTANYL CITRATE 50 MCG: 50 INJECTION, SOLUTION INTRAMUSCULAR; INTRAVENOUS at 07:09

## 2024-09-09 RX ADMIN — PROPOFOL 30 MG: 10 INJECTION, EMULSION INTRAVENOUS at 07:09

## 2024-09-09 RX ADMIN — HYDROMORPHONE HYDROCHLORIDE 0.2 MG: 1 INJECTION, SOLUTION INTRAMUSCULAR; INTRAVENOUS; SUBCUTANEOUS at 07:09

## 2024-09-09 RX ADMIN — CEFAZOLIN 2 G: 330 INJECTION, POWDER, FOR SOLUTION INTRAMUSCULAR; INTRAVENOUS at 07:09

## 2024-09-09 RX ADMIN — SODIUM CHLORIDE: 0.9 INJECTION, SOLUTION INTRAVENOUS at 06:09

## 2024-09-09 RX ADMIN — FENTANYL CITRATE 25 MCG: 50 INJECTION, SOLUTION INTRAMUSCULAR; INTRAVENOUS at 07:09

## 2024-09-09 RX ADMIN — FENTANYL CITRATE 25 MCG: 50 INJECTION INTRAMUSCULAR; INTRAVENOUS at 08:09

## 2024-09-09 RX ADMIN — HYDROMORPHONE HYDROCHLORIDE 0.2 MG: 1 INJECTION, SOLUTION INTRAMUSCULAR; INTRAVENOUS; SUBCUTANEOUS at 08:09

## 2024-09-09 RX ADMIN — GLYCOPYRROLATE 0.2 MG: 0.2 INJECTION, SOLUTION INTRAMUSCULAR; INTRAVENOUS at 07:09

## 2024-09-09 RX ADMIN — LIDOCAINE HYDROCHLORIDE 100 MG: 20 INJECTION INTRAVENOUS at 07:09

## 2024-09-09 RX ADMIN — FENTANYL CITRATE 25 MCG: 50 INJECTION INTRAMUSCULAR; INTRAVENOUS at 09:09

## 2024-09-09 RX ADMIN — DEXAMETHASONE SODIUM PHOSPHATE 4 MG: 4 INJECTION INTRA-ARTICULAR; INTRALESIONAL; INTRAMUSCULAR; INTRAVENOUS; SOFT TISSUE at 07:09

## 2024-09-09 RX ADMIN — ONDANSETRON 4 MG: 2 INJECTION INTRAMUSCULAR; INTRAVENOUS at 07:09

## 2024-09-09 RX ADMIN — ACETAMINOPHEN 1000 MG: 10 INJECTION INTRAVENOUS at 07:09

## 2024-09-09 RX ADMIN — PROPOFOL 20 MG: 10 INJECTION, EMULSION INTRAVENOUS at 07:09

## 2024-09-09 RX ADMIN — KETOROLAC TROMETHAMINE 30 MG: 30 INJECTION, SOLUTION INTRAMUSCULAR; INTRAVENOUS at 07:09

## 2024-09-09 RX ADMIN — SODIUM CHLORIDE: 9 INJECTION, SOLUTION INTRAVENOUS at 05:09

## 2024-09-09 NOTE — PLAN OF CARE
Pt in preop bay 32, VSS and IV inserted. Pt denies any open wounds on body or the use of any weight loss injections. Pt needs an  updated H&P, procedural consents, an admit order and anesthesia consents, otherwise ready to roll.

## 2024-09-09 NOTE — DISCHARGE INSTRUCTIONS
Post Cystoscopy Instructions  Do not strain to have a bowel movement  No strenuous exercise x 7 days  No driving while you are on narcotic pain medications or if your barrientos  catheter is in place    What to Expect After a Cystoscopy  For the next 24-48 hours, you may feel a mild burning when you urinate. This burning is normal and expected. Drink plenty of water to dilute the urine to help relieve the burning sensation. You may also see a small amount of blood in your urine after the procedure. Do not strain to have a bowel movement. No strenuous exercise x 7 days. No driving while you are on narcotic pain medications or if your barrientos catheter is in place.     You can expect:  To pass stone fragments if you had a stone procedure  Have pain when you void from your stent if you have a stent in place  See blood in your urine if you have a stent in place    Signs and Symptoms to Report  Call the [facility] at [phone] if you develop any of the following:  Fever of 101 degrees or higher  Chills or persistent bleeding  Inability to urinate

## 2024-09-09 NOTE — PROGRESS NOTES
Pelvic pain  -     DULoxetine (CYMBALTA) 30 MG capsule; Take 1 capsule (30 mg total) by mouth once daily.  Dispense: 30 capsule; Refill: 1

## 2024-09-09 NOTE — OP NOTE
[facility]  Operative Note    Date: 09/09/2024    Pre-Op Diagnosis: Overactive bladder    Post-Op Diagnosis: same    Procedure(s) Performed:   1.  Cystoscopy with bladder botox injection  2.  Hydro distention with Bladder Instillation  3.  Exam Under Anaesthesia     Specimen(s): None    Staff Surgeon:  Donny Calle MD    Assistant Surgeon: Ham Gaines MD    Anesthesia: General LMA anesthesia    Indications: Chelsea Oconnell is a 43 y.o. female with interstitial cystitis.    Findings: Two previous biopsy scars on the posterior wall of the bladder. No Hunner's Ulcers. No terminal hematuria after hydro distention. Bladder capacity was 1 liter.   Pelvic exam under anesthesia revealed that left lateral pelvic muscles inside the vagina were tighter ( with tension) than the right side on palpation.    Estimated Blood Loss: min    Drains: none    Procedure in Detail:  After informed consent was obtained the patient was brought to the cystoscopy suite and placed in the supine position.  SCDs were applied and working.  Anesthesia was administered.  When the patient was adequately sedated she was placed in the dorsal lithotomy position and prepped and draped in the usual sterile fashion.      A rigid cystoscope in a 22 Fr sheath was introduced into the patients's bladder via the urethra.  This passed easily.  Formal cystoscopy was performed which revealed the ureteral orifices in their normal anatomic position bilaterally.  No bladder masses, trabeculations, stones or diverticuli were seen.      100 units of botox was injected into the detrusor muscle throughout the bladder.  Good wheals were raised.      Next the bladder was then filled with sterile water until equilibrium was reached at 80 cm of water. The capacity of the bladder was 650. This was repeated and the second bladder capacity was 1 liter. There was not terminal hematuria seen. There were no ulcerations seen. However there were mild  glomerulations seen on the posterior wall.     The bladder was drained and the cystoscope removed.  A 16 Fr barrientos catheter was placed.  A 200mL concoction of 40 000 U heparin, 1 % lidocaine, 0.5% bupivicaine, 100 mg of Hydrocortisone solution, and 8.4% NaHCO3 was instilled into the bladder with instructions to keep the instillation in the bladder for at least 30 minutes postoperatively.    The patient was removed from lithotomy and transferred to recovery in stable condition.    Disposition:  The patient will follow up with Dr. Calle in 6 months. Prescriptions were given for Augmentin, Percocet and Pyridium.  Cymbalta added for pelvic pain in addition to Neurontin that she has been on.      MD ROCÍO Diaz was present in the OR with the resident for the entire time of the surgery.

## 2024-09-09 NOTE — INTERVAL H&P NOTE
The patient has been examined and the H&P has been reviewed:    I concur with the findings and no changes have occurred since H&P was written.    Surgery risks, benefits and alternative options discussed and understood by patient/family.    Urine Dip - leukocyte and nitrite negative.           There are no hospital problems to display for this patient.

## 2024-09-09 NOTE — BRIEF OP NOTE
[facility]  Brief Operative Note    Surgery Date: 9/9/2024     Surgeons and Role:     * Donny Calle MD - Primary    Assisting Surgeon: None    Pre-op Diagnosis:  Overactive bladder [N32.81]  Mixed incontinence urge and stress [N39.46]  IC (interstitial cystitis) [N30.10]  Bladder pain [R39.89]    Post-op Diagnosis:  Post-Op Diagnosis Codes:     * Overactive bladder [N32.81]     * Mixed incontinence urge and stress [N39.46]     * IC (interstitial cystitis) [N30.10]     * Bladder pain [R39.89]    Procedure(s) (LRB):  CYSTOSCOPY,WITH BOTULINUM TOXIN INJECTION (N/A)  HYDRODISTENTION (N/A)  CYSTOSCOPY (N/A)  EXAM UNDER ANESTHESIA (N/A)    Anesthesia: General/MAC    Operative Findings: Bladder capacity of 1L with second hydro distension. No terminal hematuria. Left levator ani muscle significantly more tense than right.     Estimated Blood Loss: * No values recorded between 9/9/2024  7:18 AM and 9/9/2024  7:32 AM *         Specimens:   Specimen (24h ago, onward)      None              Discharge Note    OUTCOME: Patient tolerated treatment/procedure well without complication and is now ready for discharge.    DISPOSITION: Home or Self Care    FINAL DIAGNOSIS:  <principal problem not specified>    FOLLOWUP: In clinic in 6 months     DISCHARGE INSTRUCTIONS:    ACTIVITY  There are no restrictions in activity. Start doing again the things you did before the procedure.  You may experience a slight burning sensation. You may notice a small amount of blood in your urine. This will clear up within a day. Call the clinic if this continues beyond 48 hours.     DIET  Continue your normal diet. You may eat the same foods you ate before your procedure.  Drink plenty of fluids during the first 24-48 hours following your procedure.     MEDICATIONS  Resume all other previous medications from your prescribing physician.  Continue any pre=procedure antibiotics until they are all gone.     SIGNS AND  SYMPTOMS TO REPORT TO THE DOCTOR  Chills or fever greater than 101° F within 24 hours of procedure.  Changes in urination, such as increased bleeding, foul smell, cloudy urine, or painful urination.  Call your doctor with any questions or concerns.     For any emergency situation, call 911 immediately or go to your nearest emergency room.

## 2024-09-09 NOTE — ANESTHESIA PREPROCEDURE EVALUATION
09/09/2024  Chelsea Oconnell is a 43 y.o., female.      Pre-op Assessment    I have reviewed the Patient Summary Reports.     I have reviewed the Nursing Notes. I have reviewed the NPO Status.   I have reviewed the Medications.     Review of Systems  Anesthesia Hx:             Denies Family Hx of Anesthesia complications.    Denies Personal Hx of Anesthesia complications.                      Physical Exam  General: Well nourished    Airway:  Mallampati: II   Mouth Opening: Normal  TM Distance: Normal    Chest/Lungs:  Normal Respiratory Rate    Anesthesia Plan  Type of Anesthesia, risks & benefits discussed:    Anesthesia Type: Gen Natural Airway  Intra-op Monitoring Plan: Standard ASA Monitors  Post Op Pain Control Plan: multimodal analgesia  Induction:  IV  Informed Consent: Informed consent signed with the Patient and all parties understand the risks and agree with anesthesia plan.  All questions answered.   ASA Score: 1  Day of Surgery Review of History & Physical: H&P Update referred to the surgeon/provider.    Ready For Surgery From Anesthesia Perspective.   .

## 2024-09-09 NOTE — ANESTHESIA POSTPROCEDURE EVALUATION
Anesthesia Post Evaluation    Patient: Chelsea Oconnell    Procedure(s) Performed: Procedure(s) (LRB):  CYSTOSCOPY,WITH BOTULINUM TOXIN INJECTION (N/A)  HYDRODISTENTION (N/A)  CYSTOSCOPY (N/A)  EXAM UNDER ANESTHESIA (N/A)    Final Anesthesia Type: general      Patient location during evaluation: PACU  Patient participation: Yes- Able to Participate  Level of consciousness: awake and alert  Post-procedure vital signs: reviewed and stable  Pain management: adequate  Airway patency: patent    PONV status at discharge: No PONV  Anesthetic complications: no      Cardiovascular status: stable  Respiratory status: spontaneous ventilation  Hydration status: euvolemic  Follow-up not needed.          Vitals Value Taken Time   /59 09/09/24 1046   Temp 36.6 °C (97.9 °F) 09/09/24 0740   Pulse 77 09/09/24 1100   Resp 28 09/09/24 1100   SpO2 95 % 09/09/24 1100   Vitals shown include unfiled device data.      Event Time   Out of Recovery 08:45:00         Pain/Tatum Score: Pain Rating Prior to Med Admin: 7 (9/9/2024  9:45 AM)  Pain Rating Post Med Admin: 4 (9/9/2024 11:00 AM)  Tatum Score: 10 (9/9/2024  7:55 AM)

## 2024-09-09 NOTE — TRANSFER OF CARE
"Anesthesia Transfer of Care Note    Patient: Chelsea Oconnell    Procedure(s) Performed: Procedure(s) (LRB):  CYSTOSCOPY,WITH BOTULINUM TOXIN INJECTION (N/A)  HYDRODISTENTION (N/A)  CYSTOSCOPY (N/A)  EXAM UNDER ANESTHESIA (N/A)    Patient location: PACU    Anesthesia Type: general    Transport from OR: Transported from OR on 6-10 L/min O2 by face mask with adequate spontaneous ventilation    Post pain: other: patient complains of burning, resident at bedside and aware that toradol, fentanyl and IV tylenol given, pacu nurse at bedside and will continue to address pain    Post assessment: no apparent anesthetic complications and tolerated procedure well    Post vital signs: stable    Level of consciousness: awake and alert    Nausea/Vomiting: no nausea/vomiting    Complications: none    Transfer of care protocol was followed      Last vitals: Visit Vitals  /73 (BP Location: Left arm, Patient Position: Sitting)   Pulse 62   Temp 36.6 °C (97.8 °F) (Skin)   Resp 17   Ht 5' 2" (1.575 m)   Wt 72.6 kg (160 lb)   SpO2 97%   Breastfeeding No   BMI 29.26 kg/m²     "

## 2024-09-16 DIAGNOSIS — B37.49 YEAST UTI: Primary | ICD-10-CM

## 2024-09-16 RX ORDER — FLUCONAZOLE 100 MG/1
100 TABLET ORAL DAILY
Qty: 7 TABLET | Refills: 0 | Status: SHIPPED | OUTPATIENT
Start: 2024-09-16 | End: 2024-09-23

## 2024-09-16 NOTE — PROGRESS NOTES
Yeast UTI  -     fluconazole (DIFLUCAN) 100 MG tablet; Take 1 tablet (100 mg total) by mouth once daily. for 7 days  Dispense: 7 tablet; Refill: 0

## 2024-10-01 ENCOUNTER — PATIENT MESSAGE (OUTPATIENT)
Dept: UROLOGY | Facility: CLINIC | Age: 44
End: 2024-10-01
Payer: COMMERCIAL

## 2024-10-01 DIAGNOSIS — R33.9 INCOMPLETE BLADDER EMPTYING: ICD-10-CM

## 2024-10-01 DIAGNOSIS — R39.89 BLADDER PAIN: ICD-10-CM

## 2024-10-01 DIAGNOSIS — N30.10 IC (INTERSTITIAL CYSTITIS): ICD-10-CM

## 2024-10-01 NOTE — TELEPHONE ENCOUNTER
Can you write a chart note stating she uses 12fr catheters five times a day indefinitely  for incomplete bladder emptying and I will send it to [facility] on Thursday

## 2024-10-02 NOTE — TELEPHONE ENCOUNTER
IC (interstitial cystitis)    Bladder pain    Incomplete bladder emptying  -     catheter 12 Fr Misc; 1 Units by Misc.(Non-Drug; Combo Route) route 6 (six) times daily.  Dispense: 180 each; Refill: 99    Other orders  -     catheter 10 Fr Misc; 1 Units by Misc.(Non-Drug; Combo Route) route 6 (six) times daily.  Dispense: 180 each; Refill: 99

## 2024-10-15 DIAGNOSIS — R10.2 PELVIC PAIN: ICD-10-CM

## 2024-10-21 RX ORDER — DULOXETIN HYDROCHLORIDE 30 MG/1
30 CAPSULE, DELAYED RELEASE ORAL
Qty: 30 CAPSULE | Refills: 1 | Status: SHIPPED | OUTPATIENT
Start: 2024-10-21

## 2024-11-17 DIAGNOSIS — N39.0 RECURRENT UTI: ICD-10-CM

## 2024-11-18 RX ORDER — NITROFURANTOIN (MACROCRYSTALS) 100 MG/1
100 CAPSULE ORAL
Qty: 100 CAPSULE | Refills: 0 | Status: SHIPPED | OUTPATIENT
Start: 2024-11-18

## 2024-11-19 ENCOUNTER — PATIENT MESSAGE (OUTPATIENT)
Dept: GASTROENTEROLOGY | Facility: CLINIC | Age: 44
End: 2024-11-19
Payer: COMMERCIAL

## 2024-11-28 DIAGNOSIS — N30.10 IC (INTERSTITIAL CYSTITIS): ICD-10-CM

## 2024-12-02 RX ORDER — PENTOSAN POLYSULFATE SODIUM 100 MG/1
100 CAPSULE, GELATIN COATED ORAL 3 TIMES DAILY
Qty: 90 CAPSULE | Refills: 11 | Status: SHIPPED | OUTPATIENT
Start: 2024-12-02

## 2024-12-27 DIAGNOSIS — R10.2 PELVIC PAIN: ICD-10-CM

## 2025-01-02 RX ORDER — DULOXETIN HYDROCHLORIDE 30 MG/1
30 CAPSULE, DELAYED RELEASE ORAL
Qty: 30 CAPSULE | Refills: 6 | Status: SHIPPED | OUTPATIENT
Start: 2025-01-02

## 2025-05-14 ENCOUNTER — TELEPHONE (OUTPATIENT)
Dept: UROLOGY | Facility: CLINIC | Age: 45
End: 2025-05-14
Payer: COMMERCIAL

## 2025-05-14 NOTE — TELEPHONE ENCOUNTER
Copied from CRM #[id number]. Topic: General Inquiry - Patient Advice  >> May 14, 2025  8:55 AM Wanda wrote:  Pt is requesting a call from someone in the office and is asking for a return call back soon. Thanks.     Reason for call:discuss plan of care for bladder pain needs sooner appt      Patient's DX:     Patient requesting call back or [facility] [phone]    *Spoke to pt in regards to making a sooner appt. For bladder pain, IC flare up. Informed pt that Dr Calle is booked out until August and the appt she has scheduled now is the soonest appt with provider. Informed pt that I could schedule her with the Nurse Practitioner Chiqui Toure on Thursday 5/15/25 at 2:40 or 25 at 1:40 pm. Pt asked to take the appt on 25 at 1:40 pm.

## 2025-05-15 ENCOUNTER — PATIENT MESSAGE (OUTPATIENT)
Dept: UROLOGY | Facility: CLINIC | Age: 45
End: 2025-05-15

## 2025-05-15 ENCOUNTER — OFFICE VISIT (OUTPATIENT)
Dept: UROLOGY | Facility: CLINIC | Age: 45
End: 2025-05-15
Payer: COMMERCIAL

## 2025-05-15 VITALS
DIASTOLIC BLOOD PRESSURE: 84 MMHG | SYSTOLIC BLOOD PRESSURE: 129 MMHG | BODY MASS INDEX: 29.82 KG/M2 | WEIGHT: 162.06 LBS | HEART RATE: 73 BPM | HEIGHT: 62 IN

## 2025-05-15 DIAGNOSIS — N30.10 IC (INTERSTITIAL CYSTITIS): Primary | ICD-10-CM

## 2025-05-15 LAB
BILIRUBIN, UA POC OHS: NEGATIVE
BLOOD, UA POC OHS: ABNORMAL
CLARITY, UA POC OHS: CLEAR
COLOR, UA POC OHS: YELLOW
GLUCOSE, UA POC OHS: NEGATIVE
KETONES, UA POC OHS: NEGATIVE
LEUKOCYTES, UA POC OHS: NEGATIVE
NITRITE, UA POC OHS: NEGATIVE
PH, UA POC OHS: 7
PROTEIN, UA POC OHS: NEGATIVE
SPECIFIC GRAVITY, UA POC OHS: 1.01
UROBILINOGEN, UA POC OHS: 0.2

## 2025-05-15 PROCEDURE — 99999 PR PBB SHADOW E&M-EST. PATIENT-LVL IV: CPT | Mod: PBBFAC,,,

## 2025-05-15 PROCEDURE — 87086 URINE CULTURE/COLONY COUNT: CPT

## 2025-05-15 RX ORDER — HEPARIN SODIUM 5000 [USP'U]/ML
5000 INJECTION, SOLUTION INTRAVENOUS; SUBCUTANEOUS
Status: COMPLETED | OUTPATIENT
Start: 2025-05-15 | End: 2025-05-15

## 2025-05-15 RX ORDER — LIDOCAINE HYDROCHLORIDE 10 MG/ML
10 INJECTION, SOLUTION INFILTRATION; PERINEURAL
Status: COMPLETED | OUTPATIENT
Start: 2025-05-15 | End: 2025-05-15

## 2025-05-15 RX ORDER — SODIUM BICARBONATE 1 MEQ/ML
25 SYRINGE (ML) INTRAVENOUS
Status: COMPLETED | OUTPATIENT
Start: 2025-05-15 | End: 2025-05-15

## 2025-05-15 RX ORDER — BUPIVACAINE HYDROCHLORIDE 5 MG/ML
25 INJECTION, SOLUTION PERINEURAL
Status: COMPLETED | OUTPATIENT
Start: 2025-05-15 | End: 2025-05-15

## 2025-05-15 RX ADMIN — HEPARIN SODIUM 5000 UNITS: 5000 INJECTION, SOLUTION INTRAVENOUS; SUBCUTANEOUS at 03:05

## 2025-05-15 RX ADMIN — BUPIVACAINE HYDROCHLORIDE 125 MG: 5 INJECTION, SOLUTION PERINEURAL at 03:05

## 2025-05-15 RX ADMIN — LIDOCAINE HYDROCHLORIDE 10 ML: 10 INJECTION, SOLUTION INFILTRATION; PERINEURAL at 03:05

## 2025-05-15 RX ADMIN — Medication 25 MEQ: at 03:05

## 2025-05-15 NOTE — PROGRESS NOTES
CHIEF COMPLAINT:  IC flare       HISTORY OF PRESENTING ILLINESS:  Chelsea Oconnell is a 44 y.o. female who is a patient of Dr Calle. She has bilateral interstim leads and IPG's.  She presents today for painful bladder symptoms and flare up of interstitial cystitis. She reports increasing frequency of self-catheterization from twice daily to 3-4 times per day since January. She experiences inconsistent bladder emptying during catheterization, with variable urine output ranging from minimal to large amounts. These symptoms have become severe, and she is uncertain if this represents a flare-up. She typically delays treatment until symptoms become severe and describes the cocktail administration as painful, burning, and stinging and will resolve a few hours later. Her last Botox treatment was 2024 with Dr Calle.         REVIEW OF SYSTEMS:  Review of Systems   Constitutional:  Negative for chills and fever.   Gastrointestinal:  Negative for abdominal pain, nausea and vomiting.   Genitourinary:  Positive for dysuria, frequency and urgency. Negative for flank pain and hematuria.         PATIENT HISTORY:    Past Medical History:   Diagnosis Date    Crohn's colitis     High cholesterol     Interstitial cystitis     Lupus (systemic lupus erythematosus)     Urinary tract infection        Past Surgical History:   Procedure Laterality Date    bladder fistula removed       SECTION      CHOLECYSTECTOMY  2016    CYSTOSCOPY N/A 2024    Procedure: CYSTOSCOPY;  Surgeon: Donny Calle MD;  Location: [facility];  Service: Urology;  Laterality: N/A;    CYSTOSCOPY WITH HYDRODISTENSION AND BIOPSY OF BLADDER N/A 2019    Procedure: CYSTOSCOPY, WITH BLADDER HYDRODISTENSION AND BIOPSY;  Surgeon: Donny Calle MD;  Location: [address];  Service: Urology;  Laterality: N/A;  1 hour    CYSTOSCOPY WITH HYDRODISTENSION OF BLADDER N/A 1/10/2024    Procedure: CYSTOSCOPY, WITH BLADDER HYDRODISTENSION;  Surgeon: Luc  Donny CANO MD;  Location: [facility] OR 1ST FLR;  Service: Urology;  Laterality: N/A;  30 min    CYSTOSCOPY,WITH BOTULINUM TOXIN INJECTION N/A 9/9/2024    Procedure: CYSTOSCOPY,WITH BOTULINUM TOXIN INJECTION;  Surgeon: Donny Calle MD;  Location: [facility] OR;  Service: Urology;  Laterality: N/A;  100 units    DILATION OF URETHRA N/A 06/05/2019    Procedure: DILATION, URETHRA;  Surgeon: Donny Calle MD;  [facility];  Service: Urology;  Laterality: N/A;    EXAMINATION UNDER ANESTHESIA N/A 9/9/2024    Procedure: EXAM UNDER ANESTHESIA;  Surgeon: Donny Calle MD;  Location: [facility] OR;  Service: Urology;  Laterality: N/A;    FLUOROSCOPIC URODYNAMIC STUDY N/A 06/05/2019    Procedure: URODYNAMIC STUDY, FLUOROSCOPIC;  Surgeon: Donny Calle MD;  [facility];  Service: Urology;  Laterality: N/A;  1 hour    FLUOROSCOPY N/A 07/31/2019    Procedure: FLUOROSCOPY;  Surgeon: Donny Calle MD;  Location: [facility] OR 2ND FLR;  Service: Urology;  Laterality: N/A;    HEMORRHOID SURGERY      X2    HYDRODISTENTION N/A 9/9/2024    Procedure: HYDRODISTENTION;  Surgeon: Donny Calle MD;  Location: [facility];  Service: Urology;  Laterality: N/A;  44    HYSTERECTOMY      INSERTION OF SACRAL NEUROSTIMULATOR GENERATOR Left 08/14/2019    Procedure: INSERTION, PULSE GENERATOR, NEUROSTIMULATOR, SACRAL Stage2;  Surgeon: Donny Calle MD;  Location: [facility] OR 2ND FLR;  Service: Urology;  Laterality: Left;    INSERTION OF SACRAL NEUROSTIMULATOR, ELECTRODES, AND IMPLANTABLE PULSE GENERATOR (IPG) Right 07/31/2019    Procedure: INSERTION, ELECTRODE LEAD, NEUROSTIMULATOR, SACRAL;  Surgeon: Donny Calle MD;  Location: [facility] OR 2ND FLR;  Service: Urology;  Laterality: Right;    INSTILLATION OF URINARY BLADDER N/A 06/05/2019    Procedure: INSTILLATION, BLADDER;  Surgeon: Donny Calle MD;  Location: [facility] OR 1ST FLR;  Service: Urology;  Laterality: N/A;    INSTILLATION OF URINARY BLADDER N/A 1/10/2024    Procedure: INSTILLATION,  BLADDER;  Surgeon: Donny Calle MD;  Location: [facility] OR 1ST FLR;  Service: Urology;  Laterality: N/A;    liver      hemangioma removed    PERCUTANEOUS INSERTION OF SACRAL NERVE NEUROSTIMULATOR ELECTRODE LEAD Left 07/13/2022    Procedure: INSERTION, ELECTRODE LEAD, NEUROSTIMULATOR, SACRAL, PERCUTANEOUS INTERSTIM X;  Surgeon: Donny Calle MD;  Location: [facility] OR 2ND FLR;  Service: Urology;  Laterality: Left;  32879 code  11277 code  46817 code    REMOVAL OF ELECTRODE LEAD OF SACRAL NERVE STIMULATOR N/A 07/26/2022    Procedure: REMOVAL, ELECTRODE LEAD, SACRAL NERVE STIMULATOR AND INTERSTIM GENERATOR WOUNDED IRRIGATION;  Surgeon: Donny Calle MD;  Location: [facility] OR 2ND FLR;  Service: Urology;  Laterality: N/A;  45MIN    REVISION OF PROCEDURE INVOLVING SACRAL NEUROSTIMULATOR DEVICE Right 07/13/2022    Procedure: REVISION, NEUROSTIMULATOR, SACRAL;  Surgeon: Donny Calle MD;  Location: [facility] OR 2ND FLR;  Service: Urology;  Laterality: Right;  1.35  new wire  3058 old battery  91819 code  68365 code  19947 code    SURGICAL REMOVAL OF ENDOMETRIOSIS  2013 2013,2015,2016,2018       Family History   Problem Relation Name Age of Onset    No Known Problems Father      Heart disease Mother         Social History[1]    Allergies:  Iodine and iodide containing products, Shellfish containing products, Sulfa (sulfonamide antibiotics), Adhesive, Sertraline, Xanax [alprazolam], and Vancomycin analogues    Medications:  Current Medications[2]    Physical Exam  Vitals reviewed.   Constitutional:       Appearance: Normal appearance.   HENT:      Head: Normocephalic and atraumatic.   Pulmonary:      Effort: Pulmonary effort is normal. No respiratory distress.   Abdominal:      General: Abdomen is flat. There is no distension.   Skin:     Capillary Refill: Capillary refill takes less than 2 seconds.   Neurological:      General: No focal deficit present.      Mental Status: She is alert.   Psychiatric:         Mood and Affect: Mood  "normal.         Behavior: Behavior normal.         Thought Content: Thought content normal.           LABS:      In office UA today was normal.       No results found for: "PSA", "PSADIAG", "PSATOTAL", "PHIND"    Lab Results   Component Value Date    CREATININE 0.8 08/07/2023    EGFRNORACEVR >60.0 08/07/2023               IMPRESSION:    Encounter Diagnoses   Name Primary?    IC (interstitial cystitis) Yes         Assessment:   Assessment & Plan          Plan:       -Intravesical cocktail administered in clinic; Marcaine 0.5%, Lidocaine 1%, Sodium Bicarb 8.4%, 20, 000 units of Heparin. She patient was instructed to hold the mixture solution in the bladder for 20 to 30 minutes and to void as instructed.   -Urine sent off for culture  -Appt with Dr Calle on 6/17    I spent a total of 30 minutes on the day of the visit. This includes face to face time and non-face to face time preparing to see the patient (eg, review of tests), obtaining and/or reviewing separately obtained history, documenting clinical information in the electronic or other health record, independently interpreting results and communicating results to the patient/family/caregiver, or care coordinator  Reviewed the possible contributory factors for LUTS.       This note was generated with the assistance of ambient listening technology. Verbal consent was obtained by the patient and accompanying visitor(s) for the recording of patient appointment to facilitate this note. I attest to having reviewed and edited the generated note for accuracy, though some syntax or spelling errors may persist. Please contact the author of this note for any clarification.            [1]   Social History  Socioeconomic History    Marital status:    Tobacco Use    Smoking status: Never    Smokeless tobacco: Never   Substance and Sexual Activity    Alcohol use: Never    Drug use: Yes     Types: Oxycodone, Hydrocodone    Sexual activity: Yes     Partners: Male   [2] "   Current Outpatient Medications:     albuterol (PROVENTIL/VENTOLIN HFA) 90 mcg/actuation inhaler, SMARTSI Puff(s) Via Inhaler 4 Times Daily PRN, Disp: , Rfl:     albuterol-ipratropium (DUO-NEB) 2.5 mg-0.5 mg/3 mL nebulizer solution, Take by nebulization., Disp: , Rfl:     catheter 10 Fr Misc, 1 Units by Misc.(Non-Drug; Combo Route) route 6 (six) times daily., Disp: 180 each, Rfl: 99    catheter 12 Fr Misc, 1 Units by Misc.(Non-Drug; Combo Route) route 6 (six) times daily., Disp: 180 each, Rfl: 99    diazePAM (VALIUM) 10 MG Tab, Take 1 tablet (10 mg total) by mouth 2 (two) times a day., Disp: 60 tablet, Rfl: 1    DULoxetine (CYMBALTA) 30 MG capsule, TAKE ONE CAPSULE BY MOUTH EVERY DAY, Disp: 30 capsule, Rfl: 6    ELMIRON 100 mg Cap, TAKE ONE CAPSULE BY MOUTH THREE TIMES A DAY, Disp: 90 capsule, Rfl: 11    estradioL (VIVELLE-DOT) 0.1 mg/24 hr PTSW, , Disp: , Rfl:     FISH OIL 1,000 mg (120 mg-180 mg) Cap, Take 1 capsule by mouth., Disp: , Rfl:     gabapentin (NEURONTIN) 300 MG capsule, Take 1 capsule (300 mg total) by mouth 3 (three) times daily., Disp: 90 capsule, Rfl: 11    hydrOXYchloroQUINE (PLAQUENIL) 200 mg tablet, Take 200 mg by mouth once daily. TAKES MID DAY, Disp: , Rfl:     meloxicam (MOBIC) 15 MG tablet, , Disp: , Rfl:     multivitamin-Ca-iron-minerals 27-0.4 mg Tab, Take by mouth once daily., Disp: , Rfl:     naloxone (NARCAN) 4 mg/actuation [place], , Disp: , Rfl:     nitrofurantoin (MACRODANTIN) 100 MG capsule, TAKE ONE CAPSULE BY MOUTH EVERY DAY, Disp: 100 capsule, Rfl: 0    oxyCODONE (ROXICODONE) 5 MG immediate release tablet, Take 1 tablet (5 mg total) by mouth every 6 (six) hours as needed for Pain., Disp: 5 tablet, Rfl: 0    oxyCODONE-acetaminophen (PERCOCET) 5-325 mg per tablet, Take 1 tablet by mouth every 4 (four) hours as needed for Pain., Disp: 6 tablet, Rfl: 0    oxyCODONE-acetaminophen (PERCOCET) 5-325 mg per tablet, Take 1 tablet by mouth every 4 (four) hours as needed for Pain., Disp: 20  tablet, Rfl: 0    pregabalin (LYRICA) 50 MG capsule, , Disp: , Rfl:     tamsulosin (FLOMAX) 0.4 mg Cap, Take 1 capsule (0.4 mg total) by mouth every evening., Disp: 90 capsule, Rfl: 3    venlafaxine (EFFEXOR-XR) 75 MG 24 hr capsule, Take by mouth every morning., Disp: , Rfl:     VITAMIN D3 10 mcg (400 unit) tablet, Take 1 tablet by mouth., Disp: , Rfl:     vitamin E 100 UNIT capsule, Take 100 Units by mouth., Disp: , Rfl:     Current Facility-Administered Medications:     heparin (porcine) injection 20,000 Units, 20,000 Units, Subcutaneous, 1 time in Clinic/HOD,     sodium bicarbonate 4.2% 20 mL, 10 mEq, Bladder Instillation, 1 time in Clinic/HOD,     Facility-Administered Medications Ordered in Other Visits:     0.9%  NaCl infusion, , Intravenous, Continuous, Akil Rosen MD, Last Rate: 0 mL/hr at 07/26/22 1344, New Bag at 01/10/24 1342    LIDOcaine (PF) 10 mg/ml (1%) injection 10 mg, 1 mL, Intradermal, Once, Akil Rosen MD

## 2025-05-16 LAB — BACTERIA UR CULT: NORMAL

## 2025-05-19 ENCOUNTER — RESULTS FOLLOW-UP (OUTPATIENT)
Dept: UROLOGY | Facility: CLINIC | Age: 45
End: 2025-05-19

## 2025-05-19 ENCOUNTER — TELEPHONE (OUTPATIENT)
Dept: UROLOGY | Facility: CLINIC | Age: 45
End: 2025-05-19
Payer: COMMERCIAL

## 2025-05-19 NOTE — TELEPHONE ENCOUNTER
Had bladder instillation on 5/15/25/  Urine culture was negative.  She is scheduled for a virtual visit tomorrow.  Depending on how she responded from her recent bladder instillation, will plan her next treatment plan.  Last cysto hydrodistention with botox injection was 9/9/24.      May also consider trigger point injection on the left pelvic muscles if exam under anesthesia reveals pain at the site.

## 2025-05-20 ENCOUNTER — OFFICE VISIT (OUTPATIENT)
Dept: UROLOGY | Facility: CLINIC | Age: 45
End: 2025-05-20
Payer: COMMERCIAL

## 2025-05-20 DIAGNOSIS — N30.10 IC (INTERSTITIAL CYSTITIS): Primary | ICD-10-CM

## 2025-05-20 DIAGNOSIS — R39.89 BLADDER PAIN: Primary | ICD-10-CM

## 2025-05-20 DIAGNOSIS — R10.2 PELVIC PAIN: ICD-10-CM

## 2025-05-20 DIAGNOSIS — N30.10 IC (INTERSTITIAL CYSTITIS): ICD-10-CM

## 2025-05-20 PROCEDURE — 98006 SYNCH AUDIO-VIDEO EST MOD 30: CPT | Mod: 95,,, | Performed by: UROLOGY

## 2025-05-20 RX ORDER — AMOXICILLIN AND CLAVULANATE POTASSIUM 875; 125 MG/1; MG/1
1 TABLET, FILM COATED ORAL 2 TIMES DAILY
Qty: 14 TABLET | Refills: 0 | Status: SHIPPED | OUTPATIENT
Start: 2025-05-20 | End: 2025-05-27

## 2025-05-20 RX ORDER — BACLOFEN 10 MG/1
10 TABLET ORAL 3 TIMES DAILY
Qty: 90 TABLET | Refills: 5 | Status: SHIPPED | OUTPATIENT
Start: 2025-05-20 | End: 2026-05-20

## 2025-05-20 NOTE — H&P (VIEW-ONLY)
The patient location is: home  The chief complaint leading to consultation is: recurrent pelvic pain, dysuria, frequency urgency, difficulty in urination    Visit type: audiovisual    Face to Face time with patient: 15 mins  25 minutes of total time spent on the encounter, which includes face to face time and non-face to face time preparing to see the patient (eg, review of tests), Obtaining and/or reviewing separately obtained history, Documenting clinical information in the electronic or other health record, Independently interpreting results (not separately reported) and communicating results to the patient/family/caregiver, or Care coordination (not separately reported).         Each patient to whom he or she provides medical services by telemedicine is:  (1) informed of the relationship between the physician and patient and the respective role of any other health care provider with respect to management of the patient; and (2) notified that he or she may decline to receive medical services by telemedicine and may withdraw from such care at any time.    Notes:        Subjective:       Patient ID: Chelsea Oconnell is a 44 y.o. female.    Chief Complaint: recurrent pelvic pain, dysuria, frequency urgency, difficulty in urination    HPI: 42-year-old female patient presenting to clinic to establish care for incomplete emptying of the bladder.  She wants to discuss getting an SP tube placed.  The patient states that she would a liver resection and began experiencing urinary issues following that.  She is been seeing a urologist in [place] but would like to establish care here.  The patient has had 2 InterStim devices and currently self catheterizes 3 times a day.  She states that she is unable to urinate on her own and when she catheterizes she gets approximately 100-150 cc each time.  She denies the urge to urinate and states that she catheterizes when she feels lower abdominal pressure.    Chelsea  Marvin Oconnell is a 41 y.o. female who recently underwent placement of bilateral InterStim leads and IPG's. Now, there is concern for infection involving her right-sided IPG and lead. She is presenting for surgical intervention.      Procedure(s) Performed: 1/10/2024  1.  Cystoscopy with hydrodistension   Findings:   Bladder capacity was 800 then 850    Following her recent cysto hydrodistention, she responded well for a few months.  Since May, 2024, she began having more problem with recurrent UTI, urinary incontinence and bladder pain.  Lately she has experienced more problem with recurrent UTI.  Her local physician treated her with abx but never did urine culture.  C/o worsening urinary incontinence with urgency, as well as coughing and sneezing.  hx of urinary retention however she is only retaining a small residual volume.  Has been on flomax.  She is self catheterizing up to 6 times a day in order to relieve the pressure and bladder pain.  She only able to get 100 cc of urine out each time per catheterization..                   Date: 09/09/2024  Pre-Op Diagnosis: IC , pelvic pain  Post-Op Diagnosis: same   Procedure(s) Performed:   1.  Cystoscopy with bladder botox injection  2.  Hydro distention with Bladder Instillation  3.  Exam Under Anaesthesia   Findings: Two previous biopsy scars on the posterior wall of the bladder. No Hunner's Ulcers. No terminal hematuria after hydro distention. Bladder capacity was 1 liter.   Pelvic exam under anesthesia revealed that left lateral pelvic muscles inside the vagina were tighter ( with tension) than the right side on palpation.    Following her procedure, she did better.  However, recently she developed recurrent symptoms of recurrent pelvic pain, dysuria, frequency urgency, difficulty in urination.  She came in the clinic and saw a nurse practitioner.  Urine culture showed no active UTI but suspected contamination.  We are doing a virtual visit today to discuss  additional surgery.       Past Medical History:   Past Medical History:   Diagnosis Date    Crohn's colitis     High cholesterol     Interstitial cystitis     Lupus (systemic lupus erythematosus)     Urinary tract infection        Past Surgical Historical:   Past Surgical History:   Procedure Laterality Date    bladder fistula removed       SECTION      CHOLECYSTECTOMY  2016    CYSTOSCOPY N/A 2024    Procedure: CYSTOSCOPY;  Surgeon: Donny Calle MD;  Location: [facility];  Service: Urology;  Laterality: N/A;    CYSTOSCOPY WITH HYDRODISTENSION AND BIOPSY OF BLADDER N/A 2019    Procedure: CYSTOSCOPY, WITH BLADDER HYDRODISTENSION AND BIOPSY;  Surgeon: Donny Calle MD;  Location: [facility] OR 1ST FLR;  Service: Urology;  Laterality: N/A;  1 hour    CYSTOSCOPY WITH HYDRODISTENSION OF BLADDER N/A 1/10/2024    Procedure: CYSTOSCOPY, WITH BLADDER HYDRODISTENSION;  Surgeon: Donny Calle MD;  Location: [facility] OR 1ST FLR;  Service: Urology;  Laterality: N/A;  30 min    CYSTOSCOPY,WITH BOTULINUM TOXIN INJECTION N/A 2024    Procedure: CYSTOSCOPY,WITH BOTULINUM TOXIN INJECTION;  Surgeon: Donny Calle MD;  Location: [facility] OR;  Service: Urology;  Laterality: N/A;  100 units    DILATION OF URETHRA N/A 2019    Procedure: DILATION, URETHRA;  Surgeon: Donny Calle MD;  Location: [facility] OR 1ST FLR;  Service: Urology;  Laterality: N/A;    EXAMINATION UNDER ANESTHESIA N/A 2024    Procedure: EXAM UNDER ANESTHESIA;  Surgeon: Donny Calle MD;  Location: [facility] OR;  Service: Urology;  Laterality: N/A;    FLUOROSCOPIC URODYNAMIC STUDY N/A 2019    Procedure: URODYNAMIC STUDY, FLUOROSCOPIC;  Surgeon: Donny Calle MD;  Location: [facility] OR 1ST FLR;  Service: Urology;  Laterality: N/A;  1 hour    FLUOROSCOPY N/A 2019    Procedure: FLUOROSCOPY;  Surgeon: Donny Calle MD;  Location: [facility] OR 2ND FLR;  Service: Urology;  Laterality: N/A;    HEMORRHOID SURGERY      X2    HYDRODISTENTION N/A 2024     Procedure: HYDRODISTENTION;  Surgeon: Donny Calle MD;  Location: [facility] OR;  Service: Urology;  Laterality: N/A;  45    HYSTERECTOMY      INSERTION OF SACRAL NEUROSTIMULATOR GENERATOR Left 08/14/2019    Procedure: INSERTION, PULSE GENERATOR, NEUROSTIMULATOR, SACRAL Stage2;  Surgeon: Donny Calle MD;  [facility];  Service: Urology;  Laterality: Left;    INSERTION OF SACRAL NEUROSTIMULATOR, ELECTRODES, AND IMPLANTABLE PULSE GENERATOR (IPG) Right 07/31/2019    Procedure: INSERTION, ELECTRODE LEAD, NEUROSTIMULATOR, SACRAL;  Surgeon: Donny Calle MD;  [facility];  Service: Urology;  Laterality: Right;    INSTILLATION OF URINARY BLADDER N/A 06/05/2019    Procedure: INSTILLATION, BLADDER;  Surgeon: Donny Calle MD;  [facility];  Service: Urology;  Laterality: N/A;    INSTILLATION OF URINARY BLADDER N/A 1/10/2024    Procedure: INSTILLATION, BLADDER;  Surgeon: Donny Calle MD;  [facility];  Service: Urology;  Laterality: N/A;    liver      hemangioma removed    PERCUTANEOUS INSERTION OF SACRAL NERVE NEUROSTIMULATOR ELECTRODE LEAD Left 07/13/2022    Procedure: INSERTION, ELECTRODE LEAD, NEUROSTIMULATOR, SACRAL, PERCUTANEOUS INTERSTIM X;  Surgeon: Donny Calle MD;  [facility];  Service: Urology;  Laterality: Left;  59291 code  42021 code  03098 code    REMOVAL OF ELECTRODE LEAD OF SACRAL NERVE STIMULATOR N/A 07/26/2022    Procedure: REMOVAL, ELECTRODE LEAD, SACRAL NERVE STIMULATOR AND INTERSTIM GENERATOR WOUNDED IRRIGATION;  Surgeon: Donny Calle MD;  [facility];  Service: Urology;  Laterality: N/A;  45MIN    REVISION OF PROCEDURE INVOLVING SACRAL NEUROSTIMULATOR DEVICE Right 07/13/2022    Procedure: REVISION, NEUROSTIMULATOR, SACRAL;  Surgeon: Donny Calle MD;  [facility];  Service: Urology;  Laterality: Right;  1.35  new wire  3058 old battery  55588 code  44852 code  71710 code    SURGICAL REMOVAL OF  ENDOMETRIOSIS  2013    ,2015,2016,2018        Medications:   Medication List with Changes/Refills   New Medications    AMOXICILLIN-CLAVULANATE 875-125MG (AUGMENTIN) 875-125 MG PER TABLET    Take 1 tablet by mouth 2 (two) times daily. Start 3 days before your surgery for 7 days    BACLOFEN (LIORESAL) 10 MG TABLET    Take 1 tablet (10 mg total) by mouth 3 (three) times daily.   Current Medications    ALBUTEROL (PROVENTIL/VENTOLIN HFA) 90 MCG/ACTUATION INHALER    SMARTSI Puff(s) Via Inhaler 4 Times Daily PRN    ALBUTEROL-IPRATROPIUM (DUO-NEB) 2.5 MG-0.5 MG/3 ML NEBULIZER SOLUTION    Take by nebulization.    CATHETER 10 FR MISC    1 Units by Misc.(Non-Drug; Combo Route) route 6 (six) times daily.    CATHETER 12 FR MISC    1 Units by Misc.(Non-Drug; Combo Route) route 6 (six) times daily.    DIAZEPAM (VALIUM) 10 MG TAB    Take 1 tablet (10 mg total) by mouth 2 (two) times a day.    DULOXETINE (CYMBALTA) 30 MG CAPSULE    TAKE ONE CAPSULE BY MOUTH EVERY DAY    ELMIRON 100 MG CAP    TAKE ONE CAPSULE BY MOUTH THREE TIMES A DAY    ESTRADIOL (VIVELLE-DOT) 0.1 MG/24 HR PTSW        FISH OIL 1,000 MG (120 MG-180 MG) CAP    Take 1 capsule by mouth.    GABAPENTIN (NEURONTIN) 300 MG CAPSULE    Take 1 capsule (300 mg total) by mouth 3 (three) times daily.    HYDROXYCHLOROQUINE (PLAQUENIL) 200 MG TABLET    Take 200 mg by mouth once daily. TAKES MID DAY    MELOXICAM (MOBIC) 15 MG TABLET        MULTIVITAMIN-CA-IRON-MINERALS 27-0.4 MG TAB    Take by mouth once daily.    NALOXONE (NARCAN) 4 MG/ACTUATION SPRY        NITROFURANTOIN (MACRODANTIN) 100 MG CAPSULE    TAKE ONE CAPSULE BY MOUTH EVERY DAY    OXYCODONE (ROXICODONE) 5 MG IMMEDIATE RELEASE TABLET    Take 1 tablet (5 mg total) by mouth every 6 (six) hours as needed for Pain.    OXYCODONE-ACETAMINOPHEN (PERCOCET) 5-325 MG PER TABLET    Take 1 tablet by mouth every 4 (four) hours as needed for Pain.    OXYCODONE-ACETAMINOPHEN (PERCOCET) 5-325 MG PER TABLET    Take 1 tablet by  mouth every 4 (four) hours as needed for Pain.    PREGABALIN (LYRICA) 50 MG CAPSULE        TAMSULOSIN (FLOMAX) 0.4 MG CAP    Take 1 capsule (0.4 mg total) by mouth every evening.    VENLAFAXINE (EFFEXOR-XR) 75 MG 24 HR CAPSULE    Take by mouth every morning.    VITAMIN D3 10 MCG (400 UNIT) TABLET    Take 1 tablet by mouth.    VITAMIN E 100 UNIT CAPSULE    Take 100 Units by mouth.        Past Social History:   Social History     Socioeconomic History    Marital status:    Tobacco Use    Smoking status: Never    Smokeless tobacco: Never   Substance and Sexual Activity    Alcohol use: Never    Drug use: Yes     Types: Oxycodone, Hydrocodone    Sexual activity: Yes     Partners: Male     Social Drivers of Health     Financial Resource Strain: Low Risk  (5/20/2025)    Overall Financial Resource Strain (CARDIA)     Difficulty of Paying Living Expenses: Not very hard   Food Insecurity: No Food Insecurity (5/20/2025)    Hunger Vital Sign     Worried About Running Out of Food in the Last Year: Never true     Ran Out of Food in the Last Year: Never true   Transportation Needs: No Transportation Needs (5/20/2025)    PRAPARE - Transportation     Lack of Transportation (Medical): No     Lack of Transportation (Non-Medical): No   Physical Activity: Unknown (5/20/2025)    Exercise Vital Sign     Days of Exercise per Week: 4 days   Stress: No Stress Concern Present (5/20/2025)    [employer] - Occupational Stress Questionnaire     Feeling of Stress : Only a little   Housing Stability: High Risk (5/20/2025)    Housing Stability Vital Sign     Unable to Pay for Housing in the Last Year: Yes     Number of Times Moved in the Last Year: 0     Homeless in the Last Year: No       Allergies:   Review of patient's allergies indicates:   Allergen Reactions    Iodine and iodide containing products Rash and Swelling    Shellfish containing products Rash and Swelling    Sulfa (sulfonamide antibiotics) Rash  "and Swelling    Adhesive Hives     Had reaction to surgical tape after last procedure    Sertraline Hives    Xanax [alprazolam] Hives    Vancomycin analogues Itching     Pt had itching, redness to neck/face, and "feeling hot"         Family History:   Family History   Problem Relation Name Age of Onset    No Known Problems Father      Heart disease Mother          Review of Systems:  Review of Systems   Constitutional:  Negative for activity change, appetite change, chills, diaphoresis, fatigue, fever and unexpected weight change.   HENT:  Negative for congestion, dental problem, drooling, ear discharge, ear pain, facial swelling, hearing loss, mouth sores, nosebleeds, postnasal drip, rhinorrhea, sinus pressure, sinus pain, sneezing, sore throat, tinnitus, trouble swallowing and voice change.    Eyes:  Negative for photophobia, pain, discharge, redness, itching and visual disturbance.   Respiratory:  Negative for apnea, cough, choking, chest tightness, shortness of breath, wheezing and stridor.    Cardiovascular:  Negative for chest pain and leg swelling.   Gastrointestinal:  Negative for abdominal distention, abdominal pain, anal bleeding, blood in stool, constipation, diarrhea, nausea, rectal pain and vomiting.   Endocrine: Negative for cold intolerance, heat intolerance, polydipsia, polyphagia and polyuria.   Genitourinary:  Positive for difficulty urinating. Negative for decreased urine volume, dyspareunia, dysuria, enuresis, flank pain, frequency, genital sores, hematuria, menstrual problem, pelvic pain, urgency, vaginal bleeding, vaginal discharge and vaginal pain.   Musculoskeletal:  Negative for arthralgias, back pain, gait problem, joint swelling, myalgias, neck pain and neck stiffness.   Skin:  Negative for color change, pallor, rash and wound.   Allergic/Immunologic: Negative for environmental allergies, food allergies and immunocompromised state.   Neurological:  Negative for dizziness, tremors, " seizures, syncope, facial asymmetry, speech difficulty, weakness, light-headedness, numbness and headaches.   Hematological:  Negative for adenopathy. Does not bruise/bleed easily.   Psychiatric/Behavioral:  Negative for agitation, behavioral problems, confusion, decreased concentration, dysphoric mood, hallucinations, self-injury, sleep disturbance and suicidal ideas. The patient is not nervous/anxious and is not hyperactive.        Physical Exam:  Physical Exam  Exam conducted with a chaperone present.   Constitutional:       Appearance: Normal appearance.   HENT:      Head: Normocephalic.      Nose: Nose normal.      Mouth/Throat:      Mouth: Mucous membranes are moist.      Pharynx: Oropharynx is clear.   Eyes:      Extraocular Movements: Extraocular movements intact.      Conjunctiva/sclera: Conjunctivae normal.      Pupils: Pupils are equal, round, and reactive to light.   Cardiovascular:      Rate and Rhythm: Normal rate and regular rhythm.      Pulses: Normal pulses.      Heart sounds: Normal heart sounds.   Pulmonary:      Effort: Pulmonary effort is normal.      Breath sounds: Normal breath sounds.   Abdominal:      General: Abdomen is flat. Bowel sounds are normal.      Palpations: Abdomen is soft.      Hernia: There is no hernia in the left inguinal area or right inguinal area.   Genitourinary:     General: Normal vulva.      Pubic Area: No rash or pubic lice.       Labia:         Right: No rash, tenderness, lesion or injury.         Left: No rash, tenderness, lesion or injury.       Urethra: No prolapse, urethral pain, urethral swelling or urethral lesion.      Rectum: Normal.   Musculoskeletal:         General: Normal range of motion.      Cervical back: Normal range of motion and neck supple.   Lymphadenopathy:      Lower Body: No right inguinal adenopathy. No left inguinal adenopathy.   Skin:     General: Skin is warm and dry.      Capillary Refill: Capillary refill takes less than 2 seconds.    Neurological:      General: No focal deficit present.      Mental Status: She is alert and oriented to person, place, and time. Mental status is at baseline.   Psychiatric:         Mood and Affect: Mood normal.         Behavior: Behavior normal.         Thought Content: Thought content normal.         Judgment: Judgment normal.           Assessment/Plan:       IC (interstitial cystitis)  -     amoxicillin-clavulanate 875-125mg (AUGMENTIN) 875-125 mg per tablet; Take 1 tablet by mouth 2 (two) times daily. Start 3 days before your surgery for 7 days  Dispense: 14 tablet; Refill: 0    Pelvic pain  -     baclofen (LIORESAL) 10 MG tablet; Take 1 tablet (10 mg total) by mouth 3 (three) times daily.  Dispense: 90 tablet; Refill: 5        Pay attention to urine collection ( clean catch urine) and recommend to get urine culture for any suspected UTI ( even possible cath urine for culture).    Treat chronic constipation.    Increase water and empty the bladder more regularly.  Probiotics (Align) daily  D-Mannose 1 gram twice a day  Cranberry Pills / Juice  Estrace cream applied to the urethra as directed 2 to 3 x a week.    Empty the bladder before and after sexual intercourse.         Continue IC smart diet, drinking alkaline water and IC meds including Elmiron, Neurontin, and hydroxyzine 25 mg q hs.  Use pyridium for bladder pain.    Continue flomax.  She can add Beclofen to minimize pelvic muscles spasms, which I think contribute to her pelvic pain.  S/p InterStim Therapy.    Will plan   Exam before anesthesia to determine whether or not her pain can be localized to any particular area of the pelvic muscles. If so, I will plan Trigger point injection using lidocaine, kenelog, possible botox injection.  Cysto with hydrodistention, bladder instillation.  Possible cysto botox injection 100 units     She can start augmentin 3 days before her surgery.  Continue all her IC meds and IC smart diet.  Will see how she does on  beclofen and flomax.      I spent 25 minutes with the patient of which more than half was spent in direct consultation with the patient in regards to our treatment and plan.     Follow up in about 15 days (around 6/4/2025), or cysto hydrodistnetion, bladder instillation, poss. cysto botox injection, Trigger poitn injection.

## 2025-05-20 NOTE — PROGRESS NOTES
The patient location is: home  The chief complaint leading to consultation is: recurrent pelvic pain, dysuria, frequency urgency, difficulty in urination    Visit type: audiovisual    Face to Face time with patient: 15 mins  25 minutes of total time spent on the encounter, which includes face to face time and non-face to face time preparing to see the patient (eg, review of tests), Obtaining and/or reviewing separately obtained history, Documenting clinical information in the electronic or other health record, Independently interpreting results (not separately reported) and communicating results to the patient/family/caregiver, or Care coordination (not separately reported).         Each patient to whom he or she provides medical services by telemedicine is:  (1) informed of the relationship between the physician and patient and the respective role of any other health care provider with respect to management of the patient; and (2) notified that he or she may decline to receive medical services by telemedicine and may withdraw from such care at any time.    Notes:        Subjective:       Patient ID: Chelsea Oconnell is a 44 y.o. female.    Chief Complaint: recurrent pelvic pain, dysuria, frequency urgency, difficulty in urination    HPI: 42-year-old female patient presenting to clinic to establish care for incomplete emptying of the bladder.  She wants to discuss getting an SP tube placed.  The patient states that she would a liver resection and began experiencing urinary issues following that.  She is been seeing a urologist in [place] but would like to establish care here.  The patient has had 2 InterStim devices and currently self catheterizes 3 times a day.  She states that she is unable to urinate on her own and when she catheterizes she gets approximately 100-150 cc each time.  She denies the urge to urinate and states that she catheterizes when she feels lower abdominal pressure.    Chelsea  Marvin Oconnell is a 41 y.o. female who recently underwent placement of bilateral InterStim leads and IPG's. Now, there is concern for infection involving her right-sided IPG and lead. She is presenting for surgical intervention.      Procedure(s) Performed: 1/10/2024  1.  Cystoscopy with hydrodistension   Findings:   Bladder capacity was 800 then 850    Following her recent cysto hydrodistention, she responded well for a few months.  Since May, 2024, she began having more problem with recurrent UTI, urinary incontinence and bladder pain.  Lately she has experienced more problem with recurrent UTI.  Her local physician treated her with abx but never did urine culture.  C/o worsening urinary incontinence with urgency, as well as coughing and sneezing.  hx of urinary retention however she is only retaining a small residual volume.  Has been on flomax.  She is self catheterizing up to 6 times a day in order to relieve the pressure and bladder pain.  She only able to get 100 cc of urine out each time per catheterization..                   Date: 09/09/2024  Pre-Op Diagnosis: IC , pelvic pain  Post-Op Diagnosis: same   Procedure(s) Performed:   1.  Cystoscopy with bladder botox injection  2.  Hydro distention with Bladder Instillation  3.  Exam Under Anaesthesia   Findings: Two previous biopsy scars on the posterior wall of the bladder. No Hunner's Ulcers. No terminal hematuria after hydro distention. Bladder capacity was 1 liter.   Pelvic exam under anesthesia revealed that left lateral pelvic muscles inside the vagina were tighter ( with tension) than the right side on palpation.    Following her procedure, she did better.  However, recently she developed recurrent symptoms of recurrent pelvic pain, dysuria, frequency urgency, difficulty in urination.  She came in the clinic and saw a nurse practitioner.  Urine culture showed no active UTI but suspected contamination.  We are doing a virtual visit today to discuss  additional surgery.       Past Medical History:   Past Medical History:   Diagnosis Date    Crohn's colitis     High cholesterol     Interstitial cystitis     Lupus (systemic lupus erythematosus)     Urinary tract infection        Past Surgical Historical:   Past Surgical History:   Procedure Laterality Date    bladder fistula removed       SECTION      CHOLECYSTECTOMY  2016    CYSTOSCOPY N/A 2024    Procedure: CYSTOSCOPY;  Surgeon: Donny Calle MD;  Location: [facility];  Service: Urology;  Laterality: N/A;    CYSTOSCOPY WITH HYDRODISTENSION AND BIOPSY OF BLADDER N/A 2019    Procedure: CYSTOSCOPY, WITH BLADDER HYDRODISTENSION AND BIOPSY;  Surgeon: Donny Calle MD;  Location: [facility] OR 1ST FLR;  Service: Urology;  Laterality: N/A;  1 hour    CYSTOSCOPY WITH HYDRODISTENSION OF BLADDER N/A 1/10/2024    Procedure: CYSTOSCOPY, WITH BLADDER HYDRODISTENSION;  Surgeon: Donny Calle MD;  Location: [facility] OR 1ST FLR;  Service: Urology;  Laterality: N/A;  30 min    CYSTOSCOPY,WITH BOTULINUM TOXIN INJECTION N/A 2024    Procedure: CYSTOSCOPY,WITH BOTULINUM TOXIN INJECTION;  Surgeon: Donny Calle MD;  Location: [facility] OR;  Service: Urology;  Laterality: N/A;  100 units    DILATION OF URETHRA N/A 2019    Procedure: DILATION, URETHRA;  Surgeon: Donny Calle MD;  Location: [facility] OR 1ST FLR;  Service: Urology;  Laterality: N/A;    EXAMINATION UNDER ANESTHESIA N/A 2024    Procedure: EXAM UNDER ANESTHESIA;  Surgeon: Donny Calle MD;  Location: [facility] OR;  Service: Urology;  Laterality: N/A;    FLUOROSCOPIC URODYNAMIC STUDY N/A 2019    Procedure: URODYNAMIC STUDY, FLUOROSCOPIC;  Surgeon: Donny Calle MD;  Location: [facility] OR 1ST FLR;  Service: Urology;  Laterality: N/A;  1 hour    FLUOROSCOPY N/A 2019    Procedure: FLUOROSCOPY;  Surgeon: Donny Calle MD;  Location: [facility] OR 2ND FLR;  Service: Urology;  Laterality: N/A;    HEMORRHOID SURGERY      X2    HYDRODISTENTION N/A 2024     Procedure: HYDRODISTENTION;  Surgeon: Donny Calle MD;  Location: [facility] OR;  Service: Urology;  Laterality: N/A;  45    HYSTERECTOMY      INSERTION OF SACRAL NEUROSTIMULATOR GENERATOR Left 08/14/2019    Procedure: INSERTION, PULSE GENERATOR, NEUROSTIMULATOR, SACRAL Stage2;  Surgeon: Donny Calle MD;  [facility];  Service: Urology;  Laterality: Left;    INSERTION OF SACRAL NEUROSTIMULATOR, ELECTRODES, AND IMPLANTABLE PULSE GENERATOR (IPG) Right 07/31/2019    Procedure: INSERTION, ELECTRODE LEAD, NEUROSTIMULATOR, SACRAL;  Surgeon: Donny Calle MD;  [facility];  Service: Urology;  Laterality: Right;    INSTILLATION OF URINARY BLADDER N/A 06/05/2019    Procedure: INSTILLATION, BLADDER;  Surgeon: Donny Calle MD;  [facility];  Service: Urology;  Laterality: N/A;    INSTILLATION OF URINARY BLADDER N/A 1/10/2024    Procedure: INSTILLATION, BLADDER;  Surgeon: Donny Calle MD;  [facility];  Service: Urology;  Laterality: N/A;    liver      hemangioma removed    PERCUTANEOUS INSERTION OF SACRAL NERVE NEUROSTIMULATOR ELECTRODE LEAD Left 07/13/2022    Procedure: INSERTION, ELECTRODE LEAD, NEUROSTIMULATOR, SACRAL, PERCUTANEOUS INTERSTIM X;  Surgeon: Donny Calle MD;  [facility];  Service: Urology;  Laterality: Left;  94514 code  23513 code  14065 code    REMOVAL OF ELECTRODE LEAD OF SACRAL NERVE STIMULATOR N/A 07/26/2022    Procedure: REMOVAL, ELECTRODE LEAD, SACRAL NERVE STIMULATOR AND INTERSTIM GENERATOR WOUNDED IRRIGATION;  Surgeon: Donny Calle MD;  [facility];  Service: Urology;  Laterality: N/A;  45MIN    REVISION OF PROCEDURE INVOLVING SACRAL NEUROSTIMULATOR DEVICE Right 07/13/2022    Procedure: REVISION, NEUROSTIMULATOR, SACRAL;  Surgeon: Donny Calle MD;  [facility];  Service: Urology;  Laterality: Right;  1.35  new wire  3058 old battery  02834 code  29501 code  49028 code    SURGICAL REMOVAL OF  ENDOMETRIOSIS  2013    ,2015,2016,2018        Medications:   Medication List with Changes/Refills   New Medications    AMOXICILLIN-CLAVULANATE 875-125MG (AUGMENTIN) 875-125 MG PER TABLET    Take 1 tablet by mouth 2 (two) times daily. Start 3 days before your surgery for 7 days    BACLOFEN (LIORESAL) 10 MG TABLET    Take 1 tablet (10 mg total) by mouth 3 (three) times daily.   Current Medications    ALBUTEROL (PROVENTIL/VENTOLIN HFA) 90 MCG/ACTUATION INHALER    SMARTSI Puff(s) Via Inhaler 4 Times Daily PRN    ALBUTEROL-IPRATROPIUM (DUO-NEB) 2.5 MG-0.5 MG/3 ML NEBULIZER SOLUTION    Take by nebulization.    CATHETER 10 FR MISC    1 Units by Misc.(Non-Drug; Combo Route) route 6 (six) times daily.    CATHETER 12 FR MISC    1 Units by Misc.(Non-Drug; Combo Route) route 6 (six) times daily.    DIAZEPAM (VALIUM) 10 MG TAB    Take 1 tablet (10 mg total) by mouth 2 (two) times a day.    DULOXETINE (CYMBALTA) 30 MG CAPSULE    TAKE ONE CAPSULE BY MOUTH EVERY DAY    ELMIRON 100 MG CAP    TAKE ONE CAPSULE BY MOUTH THREE TIMES A DAY    ESTRADIOL (VIVELLE-DOT) 0.1 MG/24 HR PTSW        FISH OIL 1,000 MG (120 MG-180 MG) CAP    Take 1 capsule by mouth.    GABAPENTIN (NEURONTIN) 300 MG CAPSULE    Take 1 capsule (300 mg total) by mouth 3 (three) times daily.    HYDROXYCHLOROQUINE (PLAQUENIL) 200 MG TABLET    Take 200 mg by mouth once daily. TAKES MID DAY    MELOXICAM (MOBIC) 15 MG TABLET        MULTIVITAMIN-CA-IRON-MINERALS 27-0.4 MG TAB    Take by mouth once daily.    NALOXONE (NARCAN) 4 MG/ACTUATION SPRY        NITROFURANTOIN (MACRODANTIN) 100 MG CAPSULE    TAKE ONE CAPSULE BY MOUTH EVERY DAY    OXYCODONE (ROXICODONE) 5 MG IMMEDIATE RELEASE TABLET    Take 1 tablet (5 mg total) by mouth every 6 (six) hours as needed for Pain.    OXYCODONE-ACETAMINOPHEN (PERCOCET) 5-325 MG PER TABLET    Take 1 tablet by mouth every 4 (four) hours as needed for Pain.    OXYCODONE-ACETAMINOPHEN (PERCOCET) 5-325 MG PER TABLET    Take 1 tablet by  mouth every 4 (four) hours as needed for Pain.    PREGABALIN (LYRICA) 50 MG CAPSULE        TAMSULOSIN (FLOMAX) 0.4 MG CAP    Take 1 capsule (0.4 mg total) by mouth every evening.    VENLAFAXINE (EFFEXOR-XR) 75 MG 24 HR CAPSULE    Take by mouth every morning.    VITAMIN D3 10 MCG (400 UNIT) TABLET    Take 1 tablet by mouth.    VITAMIN E 100 UNIT CAPSULE    Take 100 Units by mouth.        Past Social History:   Social History     Socioeconomic History    Marital status:    Tobacco Use    Smoking status: Never    Smokeless tobacco: Never   Substance and Sexual Activity    Alcohol use: Never    Drug use: Yes     Types: Oxycodone, Hydrocodone    Sexual activity: Yes     Partners: Male     Social Drivers of Health     Financial Resource Strain: Low Risk  (5/20/2025)    Overall Financial Resource Strain (CARDIA)     Difficulty of Paying Living Expenses: Not very hard   Food Insecurity: No Food Insecurity (5/20/2025)    Hunger Vital Sign     Worried About Running Out of Food in the Last Year: Never true     Ran Out of Food in the Last Year: Never true   Transportation Needs: No Transportation Needs (5/20/2025)    PRAPARE - Transportation     Lack of Transportation (Medical): No     Lack of Transportation (Non-Medical): No   Physical Activity: Unknown (5/20/2025)    Exercise Vital Sign     Days of Exercise per Week: 4 days   Stress: No Stress Concern Present (5/20/2025)    [employer] - Occupational Stress Questionnaire     Feeling of Stress : Only a little   Housing Stability: High Risk (5/20/2025)    Housing Stability Vital Sign     Unable to Pay for Housing in the Last Year: Yes     Number of Times Moved in the Last Year: 0     Homeless in the Last Year: No       Allergies:   Review of patient's allergies indicates:   Allergen Reactions    Iodine and iodide containing products Rash and Swelling    Shellfish containing products Rash and Swelling    Sulfa (sulfonamide antibiotics) Rash  "and Swelling    Adhesive Hives     Had reaction to surgical tape after last procedure    Sertraline Hives    Xanax [alprazolam] Hives    Vancomycin analogues Itching     Pt had itching, redness to neck/face, and "feeling hot"         Family History:   Family History   Problem Relation Name Age of Onset    No Known Problems Father      Heart disease Mother          Review of Systems:  Review of Systems   Constitutional:  Negative for activity change, appetite change, chills, diaphoresis, fatigue, fever and unexpected weight change.   HENT:  Negative for congestion, dental problem, drooling, ear discharge, ear pain, facial swelling, hearing loss, mouth sores, nosebleeds, postnasal drip, rhinorrhea, sinus pressure, sinus pain, sneezing, sore throat, tinnitus, trouble swallowing and voice change.    Eyes:  Negative for photophobia, pain, discharge, redness, itching and visual disturbance.   Respiratory:  Negative for apnea, cough, choking, chest tightness, shortness of breath, wheezing and stridor.    Cardiovascular:  Negative for chest pain and leg swelling.   Gastrointestinal:  Negative for abdominal distention, abdominal pain, anal bleeding, blood in stool, constipation, diarrhea, nausea, rectal pain and vomiting.   Endocrine: Negative for cold intolerance, heat intolerance, polydipsia, polyphagia and polyuria.   Genitourinary:  Positive for difficulty urinating. Negative for decreased urine volume, dyspareunia, dysuria, enuresis, flank pain, frequency, genital sores, hematuria, menstrual problem, pelvic pain, urgency, vaginal bleeding, vaginal discharge and vaginal pain.   Musculoskeletal:  Negative for arthralgias, back pain, gait problem, joint swelling, myalgias, neck pain and neck stiffness.   Skin:  Negative for color change, pallor, rash and wound.   Allergic/Immunologic: Negative for environmental allergies, food allergies and immunocompromised state.   Neurological:  Negative for dizziness, tremors, " seizures, syncope, facial asymmetry, speech difficulty, weakness, light-headedness, numbness and headaches.   Hematological:  Negative for adenopathy. Does not bruise/bleed easily.   Psychiatric/Behavioral:  Negative for agitation, behavioral problems, confusion, decreased concentration, dysphoric mood, hallucinations, self-injury, sleep disturbance and suicidal ideas. The patient is not nervous/anxious and is not hyperactive.        Physical Exam:  Physical Exam  Exam conducted with a chaperone present.   Constitutional:       Appearance: Normal appearance.   HENT:      Head: Normocephalic.      Nose: Nose normal.      Mouth/Throat:      Mouth: Mucous membranes are moist.      Pharynx: Oropharynx is clear.   Eyes:      Extraocular Movements: Extraocular movements intact.      Conjunctiva/sclera: Conjunctivae normal.      Pupils: Pupils are equal, round, and reactive to light.   Cardiovascular:      Rate and Rhythm: Normal rate and regular rhythm.      Pulses: Normal pulses.      Heart sounds: Normal heart sounds.   Pulmonary:      Effort: Pulmonary effort is normal.      Breath sounds: Normal breath sounds.   Abdominal:      General: Abdomen is flat. Bowel sounds are normal.      Palpations: Abdomen is soft.      Hernia: There is no hernia in the left inguinal area or right inguinal area.   Genitourinary:     General: Normal vulva.      Pubic Area: No rash or pubic lice.       Labia:         Right: No rash, tenderness, lesion or injury.         Left: No rash, tenderness, lesion or injury.       Urethra: No prolapse, urethral pain, urethral swelling or urethral lesion.      Rectum: Normal.   Musculoskeletal:         General: Normal range of motion.      Cervical back: Normal range of motion and neck supple.   Lymphadenopathy:      Lower Body: No right inguinal adenopathy. No left inguinal adenopathy.   Skin:     General: Skin is warm and dry.      Capillary Refill: Capillary refill takes less than 2 seconds.    Neurological:      General: No focal deficit present.      Mental Status: She is alert and oriented to person, place, and time. Mental status is at baseline.   Psychiatric:         Mood and Affect: Mood normal.         Behavior: Behavior normal.         Thought Content: Thought content normal.         Judgment: Judgment normal.           Assessment/Plan:       IC (interstitial cystitis)  -     amoxicillin-clavulanate 875-125mg (AUGMENTIN) 875-125 mg per tablet; Take 1 tablet by mouth 2 (two) times daily. Start 3 days before your surgery for 7 days  Dispense: 14 tablet; Refill: 0    Pelvic pain  -     baclofen (LIORESAL) 10 MG tablet; Take 1 tablet (10 mg total) by mouth 3 (three) times daily.  Dispense: 90 tablet; Refill: 5        Pay attention to urine collection ( clean catch urine) and recommend to get urine culture for any suspected UTI ( even possible cath urine for culture).    Treat chronic constipation.    Increase water and empty the bladder more regularly.  Probiotics (Align) daily  D-Mannose 1 gram twice a day  Cranberry Pills / Juice  Estrace cream applied to the urethra as directed 2 to 3 x a week.    Empty the bladder before and after sexual intercourse.         Continue IC smart diet, drinking alkaline water and IC meds including Elmiron, Neurontin, and hydroxyzine 25 mg q hs.  Use pyridium for bladder pain.    Continue flomax.  She can add Beclofen to minimize pelvic muscles spasms, which I think contribute to her pelvic pain.  S/p InterStim Therapy.    Will plan   Exam before anesthesia to determine whether or not her pain can be localized to any particular area of the pelvic muscles. If so, I will plan Trigger point injection using lidocaine, kenelog, possible botox injection.  Cysto with hydrodistention, bladder instillation.  Possible cysto botox injection 100 units     She can start augmentin 3 days before her surgery.  Continue all her IC meds and IC smart diet.  Will see how she does on  beclofen and flomax.      I spent 25 minutes with the patient of which more than half was spent in direct consultation with the patient in regards to our treatment and plan.     Follow up in about 15 days (around 6/4/2025), or cysto hydrodistnetion, bladder instillation, poss. cysto botox injection, Trigger poitn injection.

## 2025-05-21 ENCOUNTER — PATIENT MESSAGE (OUTPATIENT)
Dept: UROLOGY | Facility: CLINIC | Age: 45
End: 2025-05-21
Payer: COMMERCIAL

## 2025-05-21 ENCOUNTER — TELEPHONE (OUTPATIENT)
Dept: UROLOGY | Facility: CLINIC | Age: 45
End: 2025-05-21
Payer: COMMERCIAL

## 2025-05-21 NOTE — ANESTHESIA PAT ROS NOTE
05/21/2025  Chelsea Oconnell is a 44 y.o., female.      Pre-op Assessment    I have reviewed the NPO Status.   I have reviewed the Medications.     Review of Systems  Anesthesia Hx:  No problems with previous Anesthesia   History of prior surgery of interest to airway management or planning:  Previous anesthesia: General 09/09/2024 CYSTOSCOPY,WITH BOTULINUM TOXIN INJECTION (Perineum)  HYDRODISTENTION (Perineum)  CYSTOSCOPY  EXAM UNDER ANESTHESIA (Bladder) with general anesthesia.        Denies Family Hx of Anesthesia complications.    Denies Personal Hx of Anesthesia complications.                    Social:  Non-Smoker, No Alcohol Use       Hematology/Oncology:       -- Anemia:                                  EENT/Dental:  EENT/Dental Normal           Cardiovascular:  Exercise tolerance: good    Denies Hypertension.   Denies MI.        Denies Angina.       Denies CARRINGTON.                              Pulmonary:    Asthma   Denies Shortness of breath.                  Renal/:   Renal Symptoms/Infections/Stones:   Urinary Tract Infection (UTI), Cystitis           Hepatic/GI:        Hemangioma of liver              Musculoskeletal:    Muscle Disorders: Fibromyalgia   Rheumatic Disease, Lupus         Neurological:    Denies CVA.    Denies Seizures.                                Endocrine:  Endocrine Normal            Psych:  Psychiatric Normal                         Anesthesia Assessment: Preoperative EQUATION    Planned Procedure: Procedure(s) (LRB):  CYSTOSCOPY, WITH BLADDER HYDRODISTENSION (N/A)  INSTILLATION, BLADDER (N/A)  CYSTOSCOPY,WITH BOTULINUM TOXIN INJECTION (N/A)  Requested Anesthesia Type:Monitor Anesthesia Care  Surgeon: Donny Calle MD  Service: Urology  Known or anticipated Date of Surgery:6/4/2025    Surgeon notes: reviewed    Electronic QUestionnaire Assessment completed via nurse  interview with patient.        Triage considerations:     The patient has no apparent active cardiac condition (No unstable coronary Syndrome such as severe unstable angina or recent [<1 month] myocardial infarction, decompensated CHF, severe valvular   disease or significant arrhythmia)    Previous anesthesia records:[facility] and No problems  09/09/2024 CYSTOSCOPY,WITH BOTULINUM TOXIN INJECTION (Perineum)   HYDRODISTENTION (Perineum)   CYSTOSCOPY   EXAM UNDER ANESTHESIA (Bladder)     Airway:  Mallampati: II   Mouth Opening: Normal  TM Distance: Normal       Last PCP note[facility] 3-6 months ago [facility]   Subspecialty notes: Gastroenterology, Nephrology, Urology    Other important co-morbidities: fibromyalgia, Lupus, OAB      Tests already available:  No recent tests.             Instructions given. (See in Nurse's note)    Optimization:  Anesthesia Preop Clinic Assessment  Indicated phone screen        Plan:    Testing:  BMP and Hematology Profile    Navigation: Tests Scheduled.                         Results will be tracked by Preop Clinic.    Labs resulted and noted in Media 06/04/2025

## 2025-05-21 NOTE — TELEPHONE ENCOUNTER
Spoke with patient with arrival time for surgery, 06/04/25 someone will call you the day before with arrival time.informed patient to start Augmentin 3 day prior to surgery.

## 2025-06-03 ENCOUNTER — TELEPHONE (OUTPATIENT)
Dept: UROLOGY | Facility: CLINIC | Age: 45
End: 2025-06-03
Payer: COMMERCIAL

## 2025-06-03 ENCOUNTER — ANESTHESIA EVENT (OUTPATIENT)
Dept: SURGERY | Facility: HOSPITAL | Age: 45
End: 2025-06-03
Payer: COMMERCIAL

## 2025-06-04 ENCOUNTER — HOSPITAL ENCOUNTER (OUTPATIENT)
Facility: HOSPITAL | Age: 45
Discharge: HOME | End: 2025-06-04
Attending: UROLOGY | Admitting: UROLOGY
Payer: COMMERCIAL

## 2025-06-04 ENCOUNTER — ANESTHESIA (OUTPATIENT)
Dept: SURGERY | Facility: HOSPITAL | Age: 45
End: 2025-06-04
Payer: COMMERCIAL

## 2025-06-04 VITALS
BODY MASS INDEX: 28.53 KG/M2 | HEART RATE: 72 BPM | WEIGHT: 156 LBS | SYSTOLIC BLOOD PRESSURE: 116 MMHG | DIASTOLIC BLOOD PRESSURE: 74 MMHG | RESPIRATION RATE: 20 BRPM | TEMPERATURE: 98 F | OXYGEN SATURATION: 96 %

## 2025-06-04 DIAGNOSIS — N30.10 INTERSTITIAL CYSTITIS: ICD-10-CM

## 2025-06-04 DIAGNOSIS — Z01.818 PREOPERATIVE TESTING: Primary | ICD-10-CM

## 2025-06-04 DIAGNOSIS — R10.2 PELVIC PAIN: Primary | ICD-10-CM

## 2025-06-04 PROCEDURE — 51700 IRRIGATION OF BLADDER: CPT | Mod: 51,,, | Performed by: UROLOGY

## 2025-06-04 PROCEDURE — 25000003 PHARM REV CODE 250: Performed by: UROLOGY

## 2025-06-04 PROCEDURE — 52287 CYSTOSCOPY CHEMODENERVATION: CPT | Mod: ,,, | Performed by: UROLOGY

## 2025-06-04 PROCEDURE — 71000016 HC POSTOP RECOV ADDL HR: Performed by: UROLOGY

## 2025-06-04 PROCEDURE — 20552 NJX 1/MLT TRIGGER POINT 1/2: CPT | Mod: 51,,, | Performed by: UROLOGY

## 2025-06-04 PROCEDURE — 63600175 PHARM REV CODE 636 W HCPCS

## 2025-06-04 PROCEDURE — 25000003 PHARM REV CODE 250

## 2025-06-04 PROCEDURE — 25000003 PHARM REV CODE 250: Performed by: STUDENT IN AN ORGANIZED HEALTH CARE EDUCATION/TRAINING PROGRAM

## 2025-06-04 PROCEDURE — 63600175 PHARM REV CODE 636 W HCPCS: Performed by: NURSE ANESTHETIST, CERTIFIED REGISTERED

## 2025-06-04 PROCEDURE — 37000009 HC ANESTHESIA EA ADD 15 MINS: Performed by: UROLOGY

## 2025-06-04 PROCEDURE — 71000044 HC DOSC ROUTINE RECOVERY FIRST HOUR: Performed by: UROLOGY

## 2025-06-04 PROCEDURE — 36000707: Performed by: UROLOGY

## 2025-06-04 PROCEDURE — 63600175 PHARM REV CODE 636 W HCPCS: Performed by: UROLOGY

## 2025-06-04 PROCEDURE — 25000242 PHARM REV CODE 250 ALT 637 W/ HCPCS: Performed by: NURSE ANESTHETIST, CERTIFIED REGISTERED

## 2025-06-04 PROCEDURE — 36000706: Performed by: UROLOGY

## 2025-06-04 PROCEDURE — 25000003 PHARM REV CODE 250: Performed by: NURSE ANESTHETIST, CERTIFIED REGISTERED

## 2025-06-04 PROCEDURE — 37000008 HC ANESTHESIA 1ST 15 MINUTES: Performed by: UROLOGY

## 2025-06-04 PROCEDURE — 71000015 HC POSTOP RECOV 1ST HR: Performed by: UROLOGY

## 2025-06-04 RX ORDER — OXYCODONE HYDROCHLORIDE 5 MG/1
TABLET ORAL
Status: DISCONTINUED
Start: 2025-06-04 | End: 2025-06-04 | Stop reason: HOSPADM

## 2025-06-04 RX ORDER — LIDOCAINE HYDROCHLORIDE 20 MG/ML
INJECTION INTRAVENOUS
Status: DISCONTINUED | OUTPATIENT
Start: 2025-06-04 | End: 2025-06-04

## 2025-06-04 RX ORDER — HEPARIN SODIUM 1000 [USP'U]/ML
INJECTION, SOLUTION INTRAVENOUS; SUBCUTANEOUS
Status: DISCONTINUED
Start: 2025-06-04 | End: 2025-06-04 | Stop reason: HOSPADM

## 2025-06-04 RX ORDER — SODIUM BICARBONATE 1 MEQ/ML
SYRINGE (ML) INTRAVENOUS
Status: DISCONTINUED | OUTPATIENT
Start: 2025-06-04 | End: 2025-06-04 | Stop reason: HOSPADM

## 2025-06-04 RX ORDER — CIPROFLOXACIN 500 MG/1
500 TABLET, FILM COATED ORAL 2 TIMES DAILY
Qty: 6 TABLET | Refills: 0 | Status: SHIPPED | OUTPATIENT
Start: 2025-06-04 | End: 2025-06-07

## 2025-06-04 RX ORDER — ONDANSETRON HYDROCHLORIDE 2 MG/ML
INJECTION, SOLUTION INTRAVENOUS
Status: DISCONTINUED | OUTPATIENT
Start: 2025-06-04 | End: 2025-06-04

## 2025-06-04 RX ORDER — SODIUM CHLORIDE 0.9 % (FLUSH) 0.9 %
3 SYRINGE (ML) INJECTION
Status: DISCONTINUED | OUTPATIENT
Start: 2025-06-04 | End: 2025-06-04 | Stop reason: HOSPADM

## 2025-06-04 RX ORDER — CEFAZOLIN 2 G/1
2 INJECTION, POWDER, FOR SOLUTION INTRAMUSCULAR; INTRAVENOUS
Status: COMPLETED | OUTPATIENT
Start: 2025-06-04 | End: 2025-06-04

## 2025-06-04 RX ORDER — TRIAMCINOLONE ACETONIDE 40 MG/ML
INJECTION, SUSPENSION INTRA-ARTICULAR; INTRAMUSCULAR
Status: DISCONTINUED | OUTPATIENT
Start: 2025-06-04 | End: 2025-06-04 | Stop reason: HOSPADM

## 2025-06-04 RX ORDER — LIDOCAINE HYDROCHLORIDE 10 MG/ML
INJECTION, SOLUTION EPIDURAL; INFILTRATION; INTRACAUDAL; PERINEURAL
Status: DISCONTINUED | OUTPATIENT
Start: 2025-06-04 | End: 2025-06-04 | Stop reason: HOSPADM

## 2025-06-04 RX ORDER — ATROPA BELLADONNA AND OPIUM 16.2; 3 MG/1; MG/1
SUPPOSITORY RECTAL
Status: DISCONTINUED
Start: 2025-06-04 | End: 2025-06-04 | Stop reason: HOSPADM

## 2025-06-04 RX ORDER — TRIAMCINOLONE ACETONIDE 40 MG/ML
INJECTION, SUSPENSION INTRA-ARTICULAR; INTRAMUSCULAR
Status: DISCONTINUED
Start: 2025-06-04 | End: 2025-06-04 | Stop reason: HOSPADM

## 2025-06-04 RX ORDER — FENTANYL CITRATE 50 UG/ML
25 INJECTION, SOLUTION INTRAMUSCULAR; INTRAVENOUS EVERY 5 MIN PRN
Status: DISCONTINUED | OUTPATIENT
Start: 2025-06-04 | End: 2025-06-04 | Stop reason: HOSPADM

## 2025-06-04 RX ORDER — OXYCODONE HYDROCHLORIDE 5 MG/1
5 TABLET ORAL EVERY 4 HOURS PRN
Qty: 3 TABLET | Refills: 0 | Status: SHIPPED | OUTPATIENT
Start: 2025-06-04

## 2025-06-04 RX ORDER — OXYCODONE HYDROCHLORIDE 5 MG/1
5 TABLET ORAL ONCE
Refills: 0 | Status: COMPLETED | OUTPATIENT
Start: 2025-06-04 | End: 2025-06-04

## 2025-06-04 RX ORDER — HEPARIN SODIUM 1000 [USP'U]/ML
INJECTION, SOLUTION INTRAVENOUS; SUBCUTANEOUS
Status: DISCONTINUED | OUTPATIENT
Start: 2025-06-04 | End: 2025-06-04 | Stop reason: HOSPADM

## 2025-06-04 RX ORDER — MIDAZOLAM HYDROCHLORIDE 1 MG/ML
INJECTION INTRAMUSCULAR; INTRAVENOUS
Status: DISCONTINUED | OUTPATIENT
Start: 2025-06-04 | End: 2025-06-04

## 2025-06-04 RX ORDER — ATROPA BELLADONNA AND OPIUM 16.2; 3 MG/1; MG/1
30 SUPPOSITORY RECTAL ONCE
Refills: 0 | Status: COMPLETED | OUTPATIENT
Start: 2025-06-04 | End: 2025-06-04

## 2025-06-04 RX ORDER — FENTANYL CITRATE 50 UG/ML
INJECTION, SOLUTION INTRAMUSCULAR; INTRAVENOUS
Status: DISCONTINUED | OUTPATIENT
Start: 2025-06-04 | End: 2025-06-04

## 2025-06-04 RX ORDER — DEXAMETHASONE SODIUM PHOSPHATE 4 MG/ML
INJECTION, SOLUTION INTRA-ARTICULAR; INTRALESIONAL; INTRAMUSCULAR; INTRAVENOUS; SOFT TISSUE
Status: DISCONTINUED | OUTPATIENT
Start: 2025-06-04 | End: 2025-06-04

## 2025-06-04 RX ORDER — ALBUTEROL SULFATE 90 UG/1
INHALANT RESPIRATORY (INHALATION)
Status: DISCONTINUED | OUTPATIENT
Start: 2025-06-04 | End: 2025-06-04

## 2025-06-04 RX ORDER — GLUCAGON 1 MG
1 KIT INJECTION
Status: DISCONTINUED | OUTPATIENT
Start: 2025-06-04 | End: 2025-06-04 | Stop reason: HOSPADM

## 2025-06-04 RX ORDER — BUPIVACAINE HYDROCHLORIDE 5 MG/ML
INJECTION, SOLUTION EPIDURAL; INTRACAUDAL; PERINEURAL
Status: DISCONTINUED
Start: 2025-06-04 | End: 2025-06-04 | Stop reason: HOSPADM

## 2025-06-04 RX ORDER — PROPOFOL 10 MG/ML
VIAL (ML) INTRAVENOUS
Status: DISCONTINUED | OUTPATIENT
Start: 2025-06-04 | End: 2025-06-04

## 2025-06-04 RX ORDER — IBUPROFEN 800 MG/1
800 TABLET, FILM COATED ORAL EVERY 6 HOURS PRN
Qty: 28 TABLET | Refills: 0 | Status: SHIPPED | OUTPATIENT
Start: 2025-06-04 | End: 2025-06-11

## 2025-06-04 RX ORDER — BUPIVACAINE HYDROCHLORIDE 5 MG/ML
INJECTION, SOLUTION EPIDURAL; INTRACAUDAL; PERINEURAL
Status: DISCONTINUED | OUTPATIENT
Start: 2025-06-04 | End: 2025-06-04 | Stop reason: HOSPADM

## 2025-06-04 RX ADMIN — PROPOFOL 100 MCG/KG/MIN: 10 INJECTION, EMULSION INTRAVENOUS at 09:06

## 2025-06-04 RX ADMIN — MIDAZOLAM HYDROCHLORIDE 2 MG: 2 INJECTION, SOLUTION INTRAMUSCULAR; INTRAVENOUS at 09:06

## 2025-06-04 RX ADMIN — SODIUM CHLORIDE: 9 INJECTION, SOLUTION INTRAVENOUS at 09:06

## 2025-06-04 RX ADMIN — DEXAMETHASONE SODIUM PHOSPHATE 4 MG: 4 INJECTION, SOLUTION INTRAMUSCULAR; INTRAVENOUS at 09:06

## 2025-06-04 RX ADMIN — LIDOCAINE HYDROCHLORIDE 80 MG: 20 INJECTION INTRAVENOUS at 09:06

## 2025-06-04 RX ADMIN — PROPOFOL 20 MG: 10 INJECTION, EMULSION INTRAVENOUS at 09:06

## 2025-06-04 RX ADMIN — ATROPA BELLADONNA AND OPIUM 30 MG: 16.2; 3 SUPPOSITORY RECTAL at 10:06

## 2025-06-04 RX ADMIN — FENTANYL CITRATE 50 MCG: 50 INJECTION, SOLUTION INTRAMUSCULAR; INTRAVENOUS at 09:06

## 2025-06-04 RX ADMIN — OXYCODONE 5 MG: 5 TABLET ORAL at 10:06

## 2025-06-04 RX ADMIN — ALBUTEROL SULFATE 2 PUFF: 108 AEROSOL, METERED RESPIRATORY (INHALATION) at 09:06

## 2025-06-04 RX ADMIN — CEFAZOLIN 2 G: 2 INJECTION, POWDER, FOR SOLUTION INTRAMUSCULAR; INTRAVENOUS at 09:06

## 2025-06-04 RX ADMIN — ONDANSETRON 4 MG: 2 INJECTION INTRAMUSCULAR; INTRAVENOUS at 09:06

## 2025-06-04 NOTE — OP NOTE
[facility]  Operative Note    Date: 06/05/2025    Pre-Op Diagnosis: Overactive bladder    Post-Op Diagnosis: same    Procedure(s) Performed:   1.  Cystoscopy with bladder botox injection  2.  Bladder Instillation   3.  Levator Ani Trigger Point Injections bilaterally   4.  Exam Under Anesthesia     Specimen(s): None    Staff Surgeon:  Donny Calle MD    Assistant Surgeon: Ham Gaines MD    Anesthesia: Monitored Local Anesthesia with Sedation    Indications: Chelsea Oconnell is a 44 y.o. female with a history of IC who presents today for cystoscopy with botox injections, trigger point injections and bladder instillation.     Findings: No Hunner lesions on cystoscopy. Exam under anesthesia showed tense levator ani bilaterally that responded well to injections.     Estimated Blood Loss: min    Drains: Gomez catheter    Procedure in Detail:  After informed consent was obtained the patient was brought to the cystoscopy suite and placed in the supine position.  SCDs were applied and working.  Anesthesia was administered.  When the patient was adequately sedated she was placed in the dorsal lithotomy position and prepped and draped in the usual sterile fashion.     First we performed an exam under anesthesia which showed tense levator anis bilaterally. Using 5 mL of Triamcinolone mixed with 15 mL of 1% Marcaine the levator ani were injected with 10 cc bilaterally. There was a significant response noted after injections.      Next a rigid cystoscope in a 22 Fr sheath was introduced into the patients's bladder via the urethra.  This passed easily.  Formal cystoscopy was performed which revealed the ureteral orifices in their normal anatomic position bilaterally.  No bladder masses, trabeculations, stones or diverticuli were seen.    100 units of botox was injected into the detrusor muscle throughout the bladder.  Good wheals were raised.  The patient's bladder was drained and the cystoscope was  removed.     We then placed a barrientos catheter for bladder instillation. A mixture of 20,000 unit heparin, 50 ml 0.5 % Marcaine, 10 ml 1% lidocaine, and 10 ml 8.4% sodium bicarbonate was instilled in the bladder. The barrientos catheter was then clamped.    The patient tolerated the procedure well and was transferred to the recovery room in stable condition.      Disposition:  The patient will follow up with Dr. Calle in 6 months. She was instructed to remove the barrientos catheter at home.    Ham Gaines MD

## 2025-06-04 NOTE — DISCHARGE INSTRUCTIONS
Remove catheter once arrived home as instructed by MD.    ACTIVITY  There are no restrictions in activity. Start doing again the things you did before the procedure.  You may experience a slight burning sensation. You may notice a small amount of blood in your urine. This will clear up within a day. Call the clinic if this continues beyond 48 hours.     DIET  Continue your normal diet. You may eat the same foods you ate before your procedure.  Drink plenty of fluids during the first 24-48 hours following your procedure.     MEDICATIONS  Resume all other previous medications from your prescribing physician.  Continue any pre-procedure antibiotics until they are all gone.     SIGNS AND SYMPTOMS TO REPORT TO THE DOCTOR  Chills or fever greater than 101° F within 24 hours of procedure.  Changes in urination, such as increased bleeding, foul smell, cloudy urine, or painful urination.  Call your doctor with any questions or concerns.     For any emergency situation, call 911 immediately or go to your nearest emergency room.

## 2025-06-04 NOTE — PROGRESS NOTES
"Dr. Galloway at beside speaking with pt regarding new onset of "itching."  No rash or hives noted.No orders taken. Pt to be discharged.    "

## 2025-06-04 NOTE — DISCHARGE SUMMARY
[facility] (1st Fl)  Discharge Note  Short Stay    Procedure(s) (LRB):  CYSTOSCOPY (N/A)  INSTILLATION, BLADDER (N/A)  CYSTOSCOPY,WITH BOTULINUM TOXIN INJECTION (N/A)  EXAM UNDER ANESTHESIA (N/A)  INJECTION, TRIGGER POINT (N/A)      OUTCOME: Patient tolerated treatment/procedure well without complication and is now ready for discharge.    DISPOSITION: Home or Self Care    FINAL DIAGNOSIS:  <principal problem not specified>    FOLLOWUP: In clinic    DISCHARGE INSTRUCTIONS:    Discharge Procedure Orders   Basic Metabolic Panel   Standing Status: Future Number of Occurrences: 1 Standing Exp. Date: 08/19/26     Order Specific Question Answer Comments   Send normal result to authorizing provider's In Basket if patient is active on MyChart: Yes      CBC Without Differential   Standing Status: Future Number of Occurrences: 1 Standing Exp. Date: 08/19/26     Order Specific Question Answer Comments   Send normal result to authorizing provider's In Basket if patient is active on MyChart: Yes         TIME SPENT ON DISCHARGE: 15 minutes

## 2025-06-04 NOTE — BRIEF OP NOTE
[facility] (1st Fl)  Brief Operative Note    SUMMARY     Surgery Date: 6/4/2025     Surgeons and Role:     * Donny Calle MD - Primary     * Derek Okeefe MD - Resident - Assisting     * Ham Gaines MD - Resident - Assisting        Pre-op Diagnosis:  Bladder pain [R39.89]  IC (interstitial cystitis) [N30.10]  Pelvic pain [R10.2]    Post-op Diagnosis:  Post-Op Diagnosis Codes:     * Bladder pain [R39.89]     * IC (interstitial cystitis) [N30.10]     * Pelvic pain [R10.2]    Procedure(s) (LRB):  CYSTOSCOPY (N/A)  INSTILLATION, BLADDER (N/A)  CYSTOSCOPY,WITH BOTULINUM TOXIN INJECTION (N/A)  EXAM UNDER ANESTHESIA (N/A)  INJECTION, TRIGGER POINT (N/A)    Anesthesia: Monitor Anesthesia Care    Implants:  * No implants in log *    Operative Findings: Trigger point injections, cystoscopy with bladder botox and bladder instillation.     Estimated Blood Loss: * No values recorded between 6/4/2025  9:12 AM and 6/4/2025  9:41 AM *    Estimated Blood Loss has not been documented. EBL = min.         Specimens:   Specimen (24h ago, onward)      None          * No specimens in log *    [id number]

## 2025-06-04 NOTE — PLAN OF CARE
Preop assessment complete. Needs anesthesia consent. Spouse at bedside. All questions and concerns addressed. No apparent distress noted. Belongings placed in locker. Patient removed hearing aid so place face her when speaking.

## 2025-06-04 NOTE — INTERVAL H&P NOTE
The patient has been examined and the H&P has been reviewed:    I concur with the findings and no changes have occurred since H&P was written.    Surgery risks, benefits and alternative options discussed and understood by patient/family.    Urine dip: Leukocyte and nitrite negative.       There are no hospital problems to display for this patient.

## 2025-06-06 ENCOUNTER — TELEPHONE (OUTPATIENT)
Dept: UROLOGY | Facility: CLINIC | Age: 45
End: 2025-06-06
Payer: COMMERCIAL

## (undated) DEVICE — NDL WILLIAMS CYSTOSCOPIC

## (undated) DEVICE — SEE MEDLINE ITEM 154981

## (undated) DEVICE — UNDERGLOVES BIOGEL PI SZ 7 LF

## (undated) DEVICE — UNDERGLOVES BIOGEL PI SIZE 7.5

## (undated) DEVICE — SPRAY MASTISOL

## (undated) DEVICE — TRAY CATH FOL SIL URIMTR 16FR

## (undated) DEVICE — SYR B-D DISP CONTROL 10CC100/C

## (undated) DEVICE — COVERS PROBE NR-48 STERILE

## (undated) DEVICE — NDL BLUNT W/O FILTER 18GX1.5IN

## (undated) DEVICE — NDL 25GA 5FR 35MM

## (undated) DEVICE — SUT 2-0 12-18IN SILK

## (undated) DEVICE — SOL WATER STRL IRR 1000ML

## (undated) DEVICE — KIT PROBE COVER WITH GEL

## (undated) DEVICE — CUP MEDICINE GRADUATED 1OZ

## (undated) DEVICE — TOWEL OR XRAY BLUE 17X26IN

## (undated) DEVICE — DRESSING TRANS 2X2 TEGADERM

## (undated) DEVICE — TAPE SILK 3IN

## (undated) DEVICE — DRAPE ABDOMINAL TIBURON 14X11

## (undated) DEVICE — SYR 10CC LUER LOCK

## (undated) DEVICE — DRESSING TRANS 4X4 TEGADERM

## (undated) DEVICE — BAG LINGEMAN DRAIN UROLOGY

## (undated) DEVICE — SUT MCRYL PLUS 4-0 PS2 27IN

## (undated) DEVICE — ADAPTER HOSE 10FT 8MM

## (undated) DEVICE — DRESSING TELFA STRL 4X3 LF

## (undated) DEVICE — SYR 50ML CATH TIP

## (undated) DEVICE — ADHESIVE MASTISOL VIAL 48/BX

## (undated) DEVICE — GLOVE BIOGEL 7.5

## (undated) DEVICE — SUT ETHILON 2-0 PSLX 30IN

## (undated) DEVICE — ELECTRODE REM PLYHSV RETURN 9

## (undated) DEVICE — GOWN SURGICAL X-LARGE

## (undated) DEVICE — SUT VICRYL 3-0 27 SH

## (undated) DEVICE — DRAPE C ARM 42 X 120 10/BX

## (undated) DEVICE — NDL 18GA

## (undated) DEVICE — GLOVE SENSICARE PI SURG 7

## (undated) DEVICE — TUBING SUC UNIV W/CONN 12FT

## (undated) DEVICE — SEE MEDLINE ITEM 157131

## (undated) DEVICE — TRAY CYSTO BASIN OMC

## (undated) DEVICE — SCREENER ISTIM EXT NEROSTIMLTR

## (undated) DEVICE — GAUZE SPONGE 4X4 12PLY

## (undated) DEVICE — ELECTRODE NEEDLE 2.8IN

## (undated) DEVICE — SUT 2-0 VICRYL / SH (J417)

## (undated) DEVICE — TRAY MINOR GEN SURG

## (undated) DEVICE — DRAPE C-ARMOR EQUIPMENT COVER

## (undated) DEVICE — SUT ETHILON 3-0 PS2 18 BLK

## (undated) DEVICE — NDL BLUNT TIP 16GX1/2

## (undated) DEVICE — TRAY FOLEY 16FR INFECTION CONT

## (undated) DEVICE — PACK CYSTOSCOPY III SIRUS

## (undated) DEVICE — NDL HYPO REG 25G X 1 1/2

## (undated) DEVICE — SOL IRR WATER STRL 3000 ML

## (undated) DEVICE — CONTAINER SPECIMEN OR STER 4OZ

## (undated) DEVICE — TRAY SKIN SCRUB WET PREMIUM

## (undated) DEVICE — SYR LUER LOCK 1CC

## (undated) DEVICE — NDL 18GA X1 1/2 REG BEVEL

## (undated) DEVICE — SEE MEDLINE ITEM 152186

## (undated) DEVICE — NDL 22GA X1 1/2 REG BEVEL

## (undated) DEVICE — SEE MEDLINE ITEM 146417

## (undated) DEVICE — SEE MEDLINE ITEM 157148

## (undated) DEVICE — TIP YANKAUERS BULB NO VENT

## (undated) DEVICE — Device

## (undated) DEVICE — NDL SPINAL SPINOCAN 22GX3.5

## (undated) DEVICE — SET IRR URLGY 2LINE UNIV SPIKE

## (undated) DEVICE — PACK CYSTO

## (undated) DEVICE — DRAPE STERI INSTRUMENT 1018

## (undated) DEVICE — TRAY MINOR GEN SURG OMC

## (undated) DEVICE — PROGRAMMER INTERSTIM HANDSET

## (undated) DEVICE — HANDSET INTERSTIM X COMM

## (undated) DEVICE — DRAPE STERI-DRAPE 1000 17X11IN

## (undated) DEVICE — KIT EVACUATOR FULL PERF 100CC

## (undated) DEVICE — CLOSURE SKIN STERI STRIP 1/2X4

## (undated) DEVICE — CABLE TWIST LOCK 64CM

## (undated) DEVICE — KIT INTRODUCER W/2 9CM FRN NDL

## (undated) DEVICE — SOL NACL IRR 3000ML

## (undated) DEVICE — SYR SLIP TIP 1CC

## (undated) DEVICE — ADHESIVE DERMABOND ADVANCED

## (undated) DEVICE — DRESSING ANTIMICROBIAL 1 INCH

## (undated) DEVICE — GOWN SMARTGOWN LVL4 X-LONG XL

## (undated) DEVICE — CABLE TEST MINI J HOOK

## (undated) DEVICE — DRAPE LAP T SHT W/ INSTR PAD

## (undated) DEVICE — TRAY CYSTO BASIN

## (undated) DEVICE — BLADE SURG CARBON STEEL SZ11

## (undated) DEVICE — SUT VICRYL CTD 2-0 GI 27 SH

## (undated) DEVICE — KIT COLLECTION E SWAB REGULAR